# Patient Record
Sex: FEMALE | URBAN - METROPOLITAN AREA
[De-identification: names, ages, dates, MRNs, and addresses within clinical notes are randomized per-mention and may not be internally consistent; named-entity substitution may affect disease eponyms.]

---

## 2017-06-03 ENCOUNTER — INPATIENT (INPATIENT)
Facility: HOSPITAL | Age: 82
LOS: 1 days | Discharge: HOME | DRG: 202 | End: 2017-06-05
Attending: INTERNAL MEDICINE | Admitting: INTERNAL MEDICINE
Payer: MEDICARE

## 2017-06-03 VITALS
WEIGHT: 130.07 LBS | TEMPERATURE: 99 F | SYSTOLIC BLOOD PRESSURE: 152 MMHG | HEIGHT: 56 IN | RESPIRATION RATE: 18 BRPM | DIASTOLIC BLOOD PRESSURE: 98 MMHG | OXYGEN SATURATION: 98 % | HEART RATE: 87 BPM

## 2017-06-03 DIAGNOSIS — I10 ESSENTIAL (PRIMARY) HYPERTENSION: ICD-10-CM

## 2017-06-03 DIAGNOSIS — J45.909 UNSPECIFIED ASTHMA, UNCOMPLICATED: ICD-10-CM

## 2017-06-03 DIAGNOSIS — E11.8 TYPE 2 DIABETES MELLITUS WITH UNSPECIFIED COMPLICATIONS: ICD-10-CM

## 2017-06-03 LAB
ALBUMIN SERPL ELPH-MCNC: 3.7 G/DL — SIGNIFICANT CHANGE UP (ref 3.3–5)
ALP SERPL-CCNC: 113 U/L — SIGNIFICANT CHANGE UP (ref 30–120)
ALT FLD-CCNC: 31 U/L DA — SIGNIFICANT CHANGE UP (ref 10–60)
ANION GAP SERPL CALC-SCNC: 8 MMOL/L — SIGNIFICANT CHANGE UP (ref 5–17)
APPEARANCE UR: CLEAR — SIGNIFICANT CHANGE UP
APTT BLD: 31.3 SEC — SIGNIFICANT CHANGE UP (ref 27.5–37.4)
AST SERPL-CCNC: 19 U/L — SIGNIFICANT CHANGE UP (ref 10–40)
BACTERIA # UR AUTO: ABNORMAL
BILIRUB SERPL-MCNC: 0.8 MG/DL — SIGNIFICANT CHANGE UP (ref 0.2–1.2)
BILIRUB UR-MCNC: NEGATIVE — SIGNIFICANT CHANGE UP
BUN SERPL-MCNC: 26 MG/DL — HIGH (ref 7–23)
CALCIUM SERPL-MCNC: 9.5 MG/DL — SIGNIFICANT CHANGE UP (ref 8.4–10.5)
CHLORIDE SERPL-SCNC: 102 MMOL/L — SIGNIFICANT CHANGE UP (ref 96–108)
CO2 SERPL-SCNC: 28 MMOL/L — SIGNIFICANT CHANGE UP (ref 22–31)
COLOR SPEC: YELLOW — SIGNIFICANT CHANGE UP
CREAT SERPL-MCNC: 1.16 MG/DL — SIGNIFICANT CHANGE UP (ref 0.5–1.3)
DIFF PNL FLD: ABNORMAL
EPI CELLS # UR: SIGNIFICANT CHANGE UP
GLUCOSE SERPL-MCNC: 245 MG/DL — HIGH (ref 70–99)
GLUCOSE UR QL: 100 MG/DL
HCT VFR BLD CALC: 48.8 % — HIGH (ref 34.5–45)
HGB BLD-MCNC: 16.3 G/DL — HIGH (ref 11.5–15.5)
INR BLD: 1 RATIO — SIGNIFICANT CHANGE UP (ref 0.88–1.16)
KETONES UR-MCNC: NEGATIVE — SIGNIFICANT CHANGE UP
LACTATE SERPL-SCNC: 1.4 MMOL/L — SIGNIFICANT CHANGE UP (ref 0.7–2)
LACTATE SERPL-SCNC: 2.7 MMOL/L — HIGH (ref 0.7–2)
LEUKOCYTE ESTERASE UR-ACNC: NEGATIVE — SIGNIFICANT CHANGE UP
MCHC RBC-ENTMCNC: 29.4 PG — SIGNIFICANT CHANGE UP (ref 27–34)
MCHC RBC-ENTMCNC: 33.4 GM/DL — SIGNIFICANT CHANGE UP (ref 32–36)
MCV RBC AUTO: 88.1 FL — SIGNIFICANT CHANGE UP (ref 80–100)
NITRITE UR-MCNC: NEGATIVE — SIGNIFICANT CHANGE UP
PH UR: 6 — SIGNIFICANT CHANGE UP (ref 5–8)
PLATELET # BLD AUTO: 263 K/UL — SIGNIFICANT CHANGE UP (ref 150–400)
POTASSIUM SERPL-MCNC: 4.9 MMOL/L — SIGNIFICANT CHANGE UP (ref 3.5–5.3)
POTASSIUM SERPL-SCNC: 4.9 MMOL/L — SIGNIFICANT CHANGE UP (ref 3.5–5.3)
PROT SERPL-MCNC: 8.3 G/DL — SIGNIFICANT CHANGE UP (ref 6–8.3)
PROT UR-MCNC: 30 MG/DL
PROTHROM AB SERPL-ACNC: 10.9 SEC — SIGNIFICANT CHANGE UP (ref 9.8–12.7)
RBC # BLD: 5.54 M/UL — HIGH (ref 3.8–5.2)
RBC # FLD: 12.9 % — SIGNIFICANT CHANGE UP (ref 10.3–14.5)
RBC CASTS # UR COMP ASSIST: SIGNIFICANT CHANGE UP /HPF (ref 0–4)
SODIUM SERPL-SCNC: 138 MMOL/L — SIGNIFICANT CHANGE UP (ref 135–145)
SP GR SPEC: 1.01 — SIGNIFICANT CHANGE UP (ref 1.01–1.02)
UROBILINOGEN FLD QL: NEGATIVE MG/DL — SIGNIFICANT CHANGE UP
WBC # BLD: 9.1 K/UL — SIGNIFICANT CHANGE UP (ref 3.8–10.5)
WBC # FLD AUTO: 9.1 K/UL — SIGNIFICANT CHANGE UP (ref 3.8–10.5)
WBC UR QL: SIGNIFICANT CHANGE UP

## 2017-06-03 PROCEDURE — 99223 1ST HOSP IP/OBS HIGH 75: CPT | Mod: AI

## 2017-06-03 PROCEDURE — 93010 ELECTROCARDIOGRAM REPORT: CPT

## 2017-06-03 PROCEDURE — 99285 EMERGENCY DEPT VISIT HI MDM: CPT

## 2017-06-03 PROCEDURE — 71020: CPT | Mod: 26

## 2017-06-03 RX ORDER — DEXTROSE 50 % IN WATER 50 %
25 SYRINGE (ML) INTRAVENOUS ONCE
Qty: 0 | Refills: 0 | Status: DISCONTINUED | OUTPATIENT
Start: 2017-06-03 | End: 2017-06-05

## 2017-06-03 RX ORDER — SODIUM CHLORIDE 9 MG/ML
1000 INJECTION INTRAMUSCULAR; INTRAVENOUS; SUBCUTANEOUS
Qty: 0 | Refills: 0 | Status: DISCONTINUED | OUTPATIENT
Start: 2017-06-03 | End: 2017-06-04

## 2017-06-03 RX ORDER — SODIUM CHLORIDE 9 MG/ML
1000 INJECTION, SOLUTION INTRAVENOUS
Qty: 0 | Refills: 0 | Status: DISCONTINUED | OUTPATIENT
Start: 2017-06-03 | End: 2017-06-05

## 2017-06-03 RX ORDER — ALBUTEROL 90 UG/1
1 AEROSOL, METERED ORAL EVERY 4 HOURS
Qty: 0 | Refills: 0 | Status: DISCONTINUED | OUTPATIENT
Start: 2017-06-03 | End: 2017-06-04

## 2017-06-03 RX ORDER — GLUCAGON INJECTION, SOLUTION 0.5 MG/.1ML
1 INJECTION, SOLUTION SUBCUTANEOUS ONCE
Qty: 0 | Refills: 0 | Status: DISCONTINUED | OUTPATIENT
Start: 2017-06-03 | End: 2017-06-05

## 2017-06-03 RX ORDER — INSULIN LISPRO 100/ML
VIAL (ML) SUBCUTANEOUS AT BEDTIME
Qty: 0 | Refills: 0 | Status: DISCONTINUED | OUTPATIENT
Start: 2017-06-03 | End: 2017-06-05

## 2017-06-03 RX ORDER — CLOPIDOGREL BISULFATE 75 MG/1
75 TABLET, FILM COATED ORAL DAILY
Qty: 0 | Refills: 0 | Status: DISCONTINUED | OUTPATIENT
Start: 2017-06-03 | End: 2017-06-05

## 2017-06-03 RX ORDER — INSULIN LISPRO 100/ML
VIAL (ML) SUBCUTANEOUS
Qty: 0 | Refills: 0 | Status: DISCONTINUED | OUTPATIENT
Start: 2017-06-03 | End: 2017-06-05

## 2017-06-03 RX ORDER — HEPARIN SODIUM 5000 [USP'U]/ML
5000 INJECTION INTRAVENOUS; SUBCUTANEOUS EVERY 12 HOURS
Qty: 0 | Refills: 0 | Status: DISCONTINUED | OUTPATIENT
Start: 2017-06-03 | End: 2017-06-05

## 2017-06-03 RX ORDER — DEXTROSE 50 % IN WATER 50 %
12.5 SYRINGE (ML) INTRAVENOUS ONCE
Qty: 0 | Refills: 0 | Status: DISCONTINUED | OUTPATIENT
Start: 2017-06-03 | End: 2017-06-05

## 2017-06-03 RX ORDER — LOSARTAN POTASSIUM 100 MG/1
25 TABLET, FILM COATED ORAL DAILY
Qty: 0 | Refills: 0 | Status: DISCONTINUED | OUTPATIENT
Start: 2017-06-03 | End: 2017-06-05

## 2017-06-03 RX ORDER — IPRATROPIUM/ALBUTEROL SULFATE 18-103MCG
3 AEROSOL WITH ADAPTER (GRAM) INHALATION EVERY 6 HOURS
Qty: 0 | Refills: 0 | Status: DISCONTINUED | OUTPATIENT
Start: 2017-06-03 | End: 2017-06-04

## 2017-06-03 RX ORDER — MULTIVIT-MIN/FERROUS GLUCONATE 9 MG/15 ML
1 LIQUID (ML) ORAL DAILY
Qty: 0 | Refills: 0 | Status: DISCONTINUED | OUTPATIENT
Start: 2017-06-03 | End: 2017-06-05

## 2017-06-03 RX ORDER — SODIUM CHLORIDE 9 MG/ML
1000 INJECTION INTRAMUSCULAR; INTRAVENOUS; SUBCUTANEOUS ONCE
Qty: 0 | Refills: 0 | Status: COMPLETED | OUTPATIENT
Start: 2017-06-03 | End: 2017-06-03

## 2017-06-03 RX ORDER — IPRATROPIUM/ALBUTEROL SULFATE 18-103MCG
3 AEROSOL WITH ADAPTER (GRAM) INHALATION ONCE
Qty: 0 | Refills: 0 | Status: COMPLETED | OUTPATIENT
Start: 2017-06-03 | End: 2017-06-03

## 2017-06-03 RX ORDER — DEXTROSE 50 % IN WATER 50 %
1 SYRINGE (ML) INTRAVENOUS ONCE
Qty: 0 | Refills: 0 | Status: DISCONTINUED | OUTPATIENT
Start: 2017-06-03 | End: 2017-06-05

## 2017-06-03 RX ORDER — METOPROLOL TARTRATE 50 MG
25 TABLET ORAL DAILY
Qty: 0 | Refills: 0 | Status: DISCONTINUED | OUTPATIENT
Start: 2017-06-03 | End: 2017-06-05

## 2017-06-03 RX ADMIN — SODIUM CHLORIDE 1000 MILLILITER(S): 9 INJECTION INTRAMUSCULAR; INTRAVENOUS; SUBCUTANEOUS at 20:10

## 2017-06-03 RX ADMIN — Medication 3 MILLILITER(S): at 19:16

## 2017-06-03 RX ADMIN — SODIUM CHLORIDE 1000 MILLILITER(S): 9 INJECTION INTRAMUSCULAR; INTRAVENOUS; SUBCUTANEOUS at 18:15

## 2017-06-03 RX ADMIN — SODIUM CHLORIDE 100 MILLILITER(S): 9 INJECTION INTRAMUSCULAR; INTRAVENOUS; SUBCUTANEOUS at 22:23

## 2017-06-03 NOTE — ED ADULT NURSE NOTE - OBJECTIVE STATEMENT
I have a cough for about a week and it's getting worse. I was told by my doctor that I have pneumonia

## 2017-06-03 NOTE — ED PROVIDER NOTE - MEDICAL DECISION MAKING DETAILS
Pt continues to be SOB despite being treated with Z-Nadeem for pneumonia this past week by Dr. Huang. Pt was seen by him this morning and told to get admitted to the hospital because of continued symptoms

## 2017-06-03 NOTE — H&P ADULT - NSHPREVIEWOFSYSTEMS_GEN_ALL_CORE
CONSTITUTIONAL: No fever, weight loss, or fatigue.  EYES: No eye pain, visual disturbances, or discharge.  ENMT:  No difficulty hearing, tinnitus, vertigo; No sinus or throat pain, (+) post nasal drip.  NECK: No pain or stiffness.  RESPIRATORY: (+) cough & wheezing, no hemoptysis; (+) shortness of breath.  CARDIOVASCULAR: No chest pain, palpitations, dizziness, or leg swelling.  GASTROINTESTINAL: No abdominal or epigastric pain. No nausea, vomiting, or hematemesis; No diarrhea or Change in bowel habits. No melena or hematochezia.  GENITOURINARY: No dysuria, frequency, hematuria, or incontinence.  NEUROLOGICAL: No headaches, focal muscle weakness, numbness, or tremors.  SKIN: No itching, burning, rashes, or lesions.  MUSCULOSKELETAL: No muscle or back pain.  PSYCHIATRIC: No depression, anxiety, or agitation.  HEME/LYMPH: No easy bruising, bleeding gums, or nose bleed.  ALLERGY AND IMMUNOLOGIC: No hives or eczema.

## 2017-06-03 NOTE — PROGRESS NOTE ADULT - SUBJECTIVE AND OBJECTIVE BOX
Patient is a 84y old  Female who presents with a chief complaint of Cough with SOB. (2017 22:09)      BRIEF HOSPITAL COURSE:   84F w/ recently diagnosed PNA by out patient PCP, started on a z-mariana and steroid taper. She has finished anbx's and almost finished  steroid taper. However, she did not improve, went back to see her PCP and he told her to come to the ER.   -patient currently in [facility] as a 1E boarder, as there were no beds available on 1E.   -patient is currently admitted for "asthma exacerbation."        PAST MEDICAL & SURGICAL HISTORY:  Hepatitis B  Macular degeneration  Aortic aneurysm  HTN (hypertension)  No significant past surgical history      Review of Systems:  CONSTITUTIONAL: No fever, chills, or fatigue  RESPIRATORY: No cough, wheezing, chills or hemoptysis; No shortness of breath  CARDIOVASCULAR: No chest pain, palpitations, dizziness, or leg swelling  GASTROINTESTINAL: No abdominal or epigastric pain. No nausea, vomiting, or hematemesis; No diarrhea or constipation. No melena or hematochezia.        Medications:    losartan 25milliGRAM(s) Oral daily  metoprolol succinate ER 25milliGRAM(s) Oral daily    ALBUTerol/ipratropium for Nebulization 3milliLiter(s) Nebulizer every 6 hours  ALBUTerol    90 MICROgram(s) HFA Inhaler 1Puff(s) Inhalation every 4 hours        heparin  Injectable 5000Unit(s) SubCutaneous every 12 hours  clopidogrel Tablet 75milliGRAM(s) Oral daily        predniSONE   Tablet 40milliGRAM(s) Oral daily  insulin lispro (HumaLOG) corrective regimen sliding scale  SubCutaneous three times a day before meals  insulin lispro (HumaLOG) corrective regimen sliding scale  SubCutaneous at bedtime  dextrose Gel 1Dose(s) Oral once PRN  dextrose 50% Injectable 12.5Gram(s) IV Push once  dextrose 50% Injectable 25Gram(s) IV Push once  dextrose 50% Injectable 25Gram(s) IV Push once  glucagon  Injectable 1milliGRAM(s) IntraMuscular once PRN    sodium chloride 0.9%. 1000milliLiter(s) IV Continuous <Continuous>  dextrose 5%. 1000milliLiter(s) IV Continuous <Continuous>  multivitamin/minerals 1Tablet(s) Oral daily                ICU Vital Signs Last 24 Hrs  T(C): 36.6, Max: 37 ( @ 17:21)  T(F): 97.8, Max: 98.6 (- @ 17:21)  HR: 71 (65 - 87)  BP: 148/76 (140/70 - 152/98)  BP(mean): 97 (97 - 97)  ABP: --  ABP(mean): --  RR: 16 (16 - 20)  SpO2: 95% (95% - 99%)          I&O's Detail    I & Os for current day (as of 2017 23:39)  =============================================  IN:    Sodium Chloride 0.9% IV Bolus: 2000 ml    Total IN: 2000 ml  ---------------------------------------------  OUT:    Total OUT: 0 ml  ---------------------------------------------  Total NET: 2000 ml        LABS:                        16.3   9.1   )-----------( 263      ( 2017 18:20 )             48.8     06-    138  |  102  |  26<H>  ----------------------------<  245<H>  4.9   |  28  |  1.16    Ca    9.5      2017 18:20    TPro  8.3  /  Alb  3.7  /  TBili  0.8  /  DBili  x   /  AST  19  /  ALT  31  /  AlkPhos  113  06-03          CAPILLARY BLOOD GLUCOSE  131 (2017 23:20)    PT/INR - ( 2017 18:20 )   PT: 10.9 sec;   INR: 1.00 ratio         PTT - ( 2017 18:20 )  PTT:31.3 sec  Urinalysis Basic - ( 2017 21:22 )    Color: Yellow / Appearance: Clear / S.015 / pH: x  Gluc: x / Ketone: Negative  / Bili: Negative / Urobili: Negative mg/dL   Blood: x / Protein: 30 mg/dL / Nitrite: Negative   Leuk Esterase: Negative / RBC: 0-2 /HPF / WBC 0-2   Sq Epi: x / Non Sq Epi: Few / Bacteria: Few      CULTURES:      Physical Examination:    General: No acute distress.  Alert, oriented, interactive, nonfocal    HEENT: Pupils equal, reactive to light.  Symmetric.    PULM: Clear to auscultation bilaterally, no significant sputum production    CVS: Regular rate and rhythm, no murmurs, rubs, or gallops    ABD: Soft, nondistended, nontender, normoactive bowel sounds, no masses

## 2017-06-03 NOTE — H&P ADULT - PROBLEM SELECTOR PLAN 1
just finished a Z-pack course at home without improvement, admitted to Telemetry, with continuous SPO2 monitoring, Oxygen via NC, bronchodilator nebs, Guaifenesin, Prednisone 40 mg PO daily, Levofloxacin 500 mg IVPB daily, IVF hydration with NS, Pulmonary consult with Dr. Coleman was called.

## 2017-06-03 NOTE — ED ADULT NURSE NOTE - PMH
Aortic aneurysm    HTN (hypertension)    Macular degeneration Aortic aneurysm    Hepatitis B    HTN (hypertension)    Macular degeneration

## 2017-06-03 NOTE — ED PROVIDER NOTE - OBJECTIVE STATEMENT
85 y/o F pt with history of aortic aneurysm, HTN, presents to the ED c/o cough and shortness of breath for approximately a week. Pt also has a headache. Pt saw PCP twice this week and had an x-ray and was diagnosed with pneumonia and prescribed a Z-pac and prednisone, with no relief. Pt had the pneumonia and flu vaccine. Denies fever, nausea, vomiting, diarrhea. No further complaints at this time.   PCP: Dr. Huang

## 2017-06-03 NOTE — H&P ADULT - PROBLEM SELECTOR PLAN 5
Patient is at high risk for VTE, started on LMWH 5000 units subcutaneously Q 12h for DVT prophylaxis.

## 2017-06-03 NOTE — ED PROVIDER NOTE - CONSTITUTIONAL, MLM
normal... Well appearing, intermittent coughing well nourished, awake, alert, oriented to person, place, time/situation and in no apparent distress.

## 2017-06-03 NOTE — ED PROVIDER NOTE - ENMT, MLM
Airway patent, Nasal mucosa clear. mildly dry. Throat has no vesicles, no oropharyngeal exudates and uvula is midline.

## 2017-06-03 NOTE — ED ADULT NURSE NOTE - CHPI ED SYMPTOMS POS
CHEST CONGESTION/COUGH/SHORTNESS OF BREATH/DYSPNEA ON EXERTION/WHEEZING right base/COUGH/CHEST CONGESTION/WHEEZING/DYSPNEA ON EXERTION/SHORTNESS OF BREATH

## 2017-06-03 NOTE — H&P ADULT - NSHPPHYSICALEXAM_GEN_ALL_CORE
GENERAL: NAD, well-groomed, well-developed.  HEAD:  Atraumatic, Normocephalic.  EYES: PERRLA, conjunctiva clear.  ENMT: no nasal discharge, no ronnie-pharyngeal erythema or exudates, (+) postnasal drip, dry MM.   NECK: Supple, No JVD.  NERVOUS SYSTEM:  Alert & oriented X3, neurologically intact grossly.  CHEST/LUNG: Good air entry B/L, no rales, rhonchi, or wheezing.  HEART: Normal S1 & S2, Grade II/VI LISA over cardiac base martine apex, no extra sounds.  ABDOMEN: Soft, non tender, non distended; bowel sounds present, no palpable masses or organomegaly.  EXTREMITIES:  No clubbing, cyanosis, or edema.  VASCULAR: 2+ radial, DPA / PTA pulses B/L.  SKIN: No rashes or lesions.  PSYCH: normal affect & behavior. GENERAL: NAD, well-groomed, well-developed.  HEAD:  Atraumatic, Normocephalic.  EYES: PERRLA, conjunctiva clear.  ENMT: no nasal discharge, no ronnie-pharyngeal erythema or exudates, (+) postnasal drip, dry MM.   NECK: Supple, No JVD.  NERVOUS SYSTEM:  Alert & oriented X3, neurologically intact grossly.  CHEST/LUNG: Good air entry B/L, no rales, rhonchi, or wheezing.  HEART: Normal S1 & S2, Grade II/VI LISA over cardiac base propagated to apex, no extra sounds.  ABDOMEN: Soft, non tender, non distended; bowel sounds present, no palpable masses or organomegaly.  EXTREMITIES:  No clubbing, cyanosis, or edema.  VASCULAR: 2+ radial, DPA / PTA pulses B/L.  SKIN: No rashes or lesions.  PSYCH: normal affect & behavior.

## 2017-06-03 NOTE — H&P ADULT - ASSESSMENT
83 y/o F with PMH of HTN, Aortic Aneurysm, Rheumatic Fever as a kid, Hep-B post transfusion, and Macular Degeneration presented with cough, SOB & chest wheezing.

## 2017-06-03 NOTE — H&P ADULT - FAMILY HISTORY
Mother  Still living? No  Family history of diabetes mellitus, Age at diagnosis: Age Unknown     Sibling  Still living? Unknown  Family history of diabetes mellitus, Age at diagnosis: Age Unknown  Family history of gestational diabetes, Age at diagnosis: Age Unknown

## 2017-06-03 NOTE — H&P ADULT - PROBLEM SELECTOR PLAN 3
RBS of 245 mg/dl, possible undiagnosed new onset DM, patient was on prednisone tapering course at home, denies any H/o DM, but reports strong family history, started her on  POC before meals & at bedtime with Lispro insulin coverage on a SS, will check Glycohemoglobin level in am.

## 2017-06-03 NOTE — ED PROVIDER NOTE - NS ED MD SCRIBE ATTENDING SCRIBE SECTIONS
REVIEW OF SYSTEMS/PAST MEDICAL/SURGICAL/SOCIAL HISTORY/VITAL SIGNS( Pullset)/HISTORY OF PRESENT ILLNESS/DISPOSITION/PHYSICAL EXAM

## 2017-06-03 NOTE — ED ADULT TRIAGE NOTE - CHIEF COMPLAINT QUOTE
I was at Dr. Jameson 2 xs this week; on z-pack, prednisone, inhaler & cough syrup for pneumonia.  He said to come to ED if I wasn't feeling better.

## 2017-06-03 NOTE — ED ADULT NURSE REASSESSMENT NOTE - NS ED NURSE REASSESS COMMENT FT1
Lactate drawn
Patient encouraged to keep Arm straight to complete NS bolus. Patient verbalized understanding of instructions.

## 2017-06-03 NOTE — H&P ADULT - HISTORY OF PRESENT ILLNESS
This is an 85 y/o F with PMH of HTN, Aortic Aneurysm, Hep-B, and Macular Degeneration presented with 8 days H/O productive cough of scanty thick sputum associated with chest wheezing & SOB. Patient was seen by her PMD last Tuesday, had a CXR, was diagnosed with PNA, and was sent home on Z-Pack, and prednisone. Patient states that she just finished the Abx & almost done with the steroid tapering course, yet still not feeling better, so she saw him again, he recommended her to be admitted to the hospital. Patient denies any fever or chills or flue-like symptoms, but reports post nasal drip, also reports having a sick neighbor that she was in the car with her just prior to the onset of symptoms. She denies any H/O asthma or COPD, stating that she is exercising for 2 miles on treadmill 5 days a week on regular basis. This is an 83 y/o F with PMH of HTN, Aortic Aneurysm, Rheumatic Fever as a kid, Hep-B post transfusion, and Macular Degeneration presented with 8 days H/O productive cough of scanty thick sputum associated with chest wheezing & SOB. Patient was seen by her PMD last Tuesday, had a CXR, was diagnosed with PNA, and was sent home on Z-Pack, and prednisone. Patient states that she just finished the Abx & almost done with the steroid tapering course, yet still not feeling better, so she saw him again, he recommended her to be admitted to the hospital. Patient denies any fever, chills, or flue-like symptoms, but reports post nasal drip. She also reports having a sick neighbor that she was in the car with her just prior to the onset of symptoms. She denies any H/O asthma or COPD, stating that she is exercising for 2 miles on treadmill 5 days a week on regular basis.

## 2017-06-04 DIAGNOSIS — E86.0 DEHYDRATION: ICD-10-CM

## 2017-06-04 DIAGNOSIS — Z29.9 ENCOUNTER FOR PROPHYLACTIC MEASURES, UNSPECIFIED: ICD-10-CM

## 2017-06-04 DIAGNOSIS — R73.09 OTHER ABNORMAL GLUCOSE: ICD-10-CM

## 2017-06-04 DIAGNOSIS — J45.909 UNSPECIFIED ASTHMA, UNCOMPLICATED: ICD-10-CM

## 2017-06-04 LAB
ANION GAP SERPL CALC-SCNC: 7 MMOL/L — SIGNIFICANT CHANGE UP (ref 5–17)
BUN SERPL-MCNC: 19 MG/DL — SIGNIFICANT CHANGE UP (ref 7–23)
CALCIUM SERPL-MCNC: 8.8 MG/DL — SIGNIFICANT CHANGE UP (ref 8.4–10.5)
CHLORIDE SERPL-SCNC: 109 MMOL/L — HIGH (ref 96–108)
CO2 SERPL-SCNC: 27 MMOL/L — SIGNIFICANT CHANGE UP (ref 22–31)
CREAT SERPL-MCNC: 0.84 MG/DL — SIGNIFICANT CHANGE UP (ref 0.5–1.3)
GLUCOSE SERPL-MCNC: 89 MG/DL — SIGNIFICANT CHANGE UP (ref 70–99)
HBA1C BLD-MCNC: 6 % — HIGH (ref 4–5.6)
HCT VFR BLD CALC: 42.8 % — SIGNIFICANT CHANGE UP (ref 34.5–45)
HGB BLD-MCNC: 14.8 G/DL — SIGNIFICANT CHANGE UP (ref 11.5–15.5)
IGE SERPL-ACNC: 13 IU/ML — SIGNIFICANT CHANGE UP (ref 0–100)
MCHC RBC-ENTMCNC: 30.8 PG — SIGNIFICANT CHANGE UP (ref 27–34)
MCHC RBC-ENTMCNC: 34.7 GM/DL — SIGNIFICANT CHANGE UP (ref 32–36)
MCV RBC AUTO: 88.8 FL — SIGNIFICANT CHANGE UP (ref 80–100)
NT-PROBNP SERPL-SCNC: 311 PG/ML — SIGNIFICANT CHANGE UP (ref 0–450)
PLATELET # BLD AUTO: 242 K/UL — SIGNIFICANT CHANGE UP (ref 150–400)
POTASSIUM SERPL-MCNC: 4.2 MMOL/L — SIGNIFICANT CHANGE UP (ref 3.5–5.3)
POTASSIUM SERPL-SCNC: 4.2 MMOL/L — SIGNIFICANT CHANGE UP (ref 3.5–5.3)
RBC # BLD: 4.82 M/UL — SIGNIFICANT CHANGE UP (ref 3.8–5.2)
RBC # FLD: 12.8 % — SIGNIFICANT CHANGE UP (ref 10.3–14.5)
SODIUM SERPL-SCNC: 143 MMOL/L — SIGNIFICANT CHANGE UP (ref 135–145)
WBC # BLD: 8.2 K/UL — SIGNIFICANT CHANGE UP (ref 3.8–10.5)
WBC # FLD AUTO: 8.2 K/UL — SIGNIFICANT CHANGE UP (ref 3.8–10.5)

## 2017-06-04 PROCEDURE — 99233 SBSQ HOSP IP/OBS HIGH 50: CPT

## 2017-06-04 RX ORDER — ALBUTEROL 90 UG/1
2.5 AEROSOL, METERED ORAL EVERY 6 HOURS
Qty: 0 | Refills: 0 | Status: DISCONTINUED | OUTPATIENT
Start: 2017-06-04 | End: 2017-06-04

## 2017-06-04 RX ORDER — LORATADINE 10 MG/1
10 TABLET ORAL DAILY
Qty: 0 | Refills: 0 | Status: DISCONTINUED | OUTPATIENT
Start: 2017-06-04 | End: 2017-06-05

## 2017-06-04 RX ORDER — BUDESONIDE AND FORMOTEROL FUMARATE DIHYDRATE 160; 4.5 UG/1; UG/1
2 AEROSOL RESPIRATORY (INHALATION)
Qty: 0 | Refills: 0 | Status: DISCONTINUED | OUTPATIENT
Start: 2017-06-04 | End: 2017-06-05

## 2017-06-04 RX ORDER — ALBUTEROL 90 UG/1
2.5 AEROSOL, METERED ORAL ONCE
Qty: 0 | Refills: 0 | Status: DISCONTINUED | OUTPATIENT
Start: 2017-06-04 | End: 2017-06-05

## 2017-06-04 RX ORDER — PANTOPRAZOLE SODIUM 20 MG/1
40 TABLET, DELAYED RELEASE ORAL
Qty: 0 | Refills: 0 | Status: DISCONTINUED | OUTPATIENT
Start: 2017-06-04 | End: 2017-06-05

## 2017-06-04 RX ADMIN — Medication 25 MILLIGRAM(S): at 06:31

## 2017-06-04 RX ADMIN — HEPARIN SODIUM 5000 UNIT(S): 5000 INJECTION INTRAVENOUS; SUBCUTANEOUS at 17:50

## 2017-06-04 RX ADMIN — LOSARTAN POTASSIUM 25 MILLIGRAM(S): 100 TABLET, FILM COATED ORAL at 06:31

## 2017-06-04 RX ADMIN — BUDESONIDE AND FORMOTEROL FUMARATE DIHYDRATE 2 PUFF(S): 160; 4.5 AEROSOL RESPIRATORY (INHALATION) at 17:51

## 2017-06-04 RX ADMIN — LORATADINE 10 MILLIGRAM(S): 10 TABLET ORAL at 11:30

## 2017-06-04 RX ADMIN — PANTOPRAZOLE SODIUM 40 MILLIGRAM(S): 20 TABLET, DELAYED RELEASE ORAL at 07:10

## 2017-06-04 RX ADMIN — HEPARIN SODIUM 5000 UNIT(S): 5000 INJECTION INTRAVENOUS; SUBCUTANEOUS at 06:25

## 2017-06-04 RX ADMIN — Medication 1 TABLET(S): at 11:30

## 2017-06-04 RX ADMIN — ALBUTEROL 2.5 MILLIGRAM(S): 90 AEROSOL, METERED ORAL at 08:05

## 2017-06-04 RX ADMIN — BUDESONIDE AND FORMOTEROL FUMARATE DIHYDRATE 2 PUFF(S): 160; 4.5 AEROSOL RESPIRATORY (INHALATION) at 07:52

## 2017-06-04 RX ADMIN — Medication 3 MILLILITER(S): at 00:35

## 2017-06-04 RX ADMIN — CLOPIDOGREL BISULFATE 75 MILLIGRAM(S): 75 TABLET, FILM COATED ORAL at 11:30

## 2017-06-04 RX ADMIN — Medication 40 MILLIGRAM(S): at 06:31

## 2017-06-04 NOTE — CONSULT NOTE ADULT - SUBJECTIVE AND OBJECTIVE BOX
Date/Time Patient Seen:  		  Referring MD:   Data Reviewed	       This is an 85 y/o F with PMH of HTN, Aortic Aneurysm, Rheumatic Fever as a kid, Hep-B post transfusion, and Macular Degeneration presented with 8 days H/O productive cough of scanty thick sputum associated with chest wheezing & SOB. Patient was seen by her PMD last Tuesday, had a CXR, was diagnosed with PNA, and was sent home on Z-Pack, and prednisone. Patient states that she just finished the Abx & almost done with the steroid tapering course, yet still not feeling better, so she saw him again, he recommended her to be admitted to the hospital. Patient denies any fever, chills, or flue-like symptoms, but reports post nasal drip. She also reports having a sick neighbor that she was in the car with her just prior to the onset of symptoms. She denies any H/O asthma or COPD, stating that she is exercising for 2 miles on treadmill 5 days a week on regular basis.        Subjective/HPI     PAST MEDICAL & SURGICAL HISTORY:  Hepatitis B  Macular degeneration  Aortic aneurysm  HTN (hypertension)  No significant past surgical history        Medication list         MEDICATIONS  (STANDING):  heparin  Injectable 5000Unit(s) SubCutaneous every 12 hours  predniSONE   Tablet 40milliGRAM(s) Oral daily  insulin lispro (HumaLOG) corrective regimen sliding scale  SubCutaneous three times a day before meals  insulin lispro (HumaLOG) corrective regimen sliding scale  SubCutaneous at bedtime  dextrose 5%. 1000milliLiter(s) IV Continuous <Continuous>  dextrose 50% Injectable 12.5Gram(s) IV Push once  dextrose 50% Injectable 25Gram(s) IV Push once  dextrose 50% Injectable 25Gram(s) IV Push once  losartan 25milliGRAM(s) Oral daily  clopidogrel Tablet 75milliGRAM(s) Oral daily  metoprolol succinate ER 25milliGRAM(s) Oral daily  multivitamin/minerals 1Tablet(s) Oral daily  buDESOnide 160 MICROgram(s)/formoterol 4.5 MICROgram(s) Inhaler 2Puff(s) Inhalation two times a day  ALBUTerol    0.083% 2.5milliGRAM(s) Nebulizer every 6 hours    MEDICATIONS  (PRN):  dextrose Gel 1Dose(s) Oral once PRN Blood Glucose LESS THAN 70 milliGRAM(s)/deciliter  glucagon  Injectable 1milliGRAM(s) IntraMuscular once PRN Glucose LESS THAN 70 milligrams/deciliter         Vitals log        ICU Vital Signs Last 24 Hrs  T(C): 36.6, Max: 37 (06-03 @ 17:21)  T(F): 97.8, Max: 98.6 (06-03 @ 17:21)  HR: 57 (57 - 87)  BP: 150/72 (140/70 - 152/98)  BP(mean): 94 (94 - 97)  ABP: --  ABP(mean): --  RR: 16 (16 - 20)  SpO2: 99% (95% - 99%)           Input and Output:  I&O's Detail    I & Os for current day (as of 04 Jun 2017 05:44)  =============================================  IN:    Sodium Chloride 0.9% IV Bolus: 2000 ml    sodium chloride 0.9%: 400 ml    Total IN: 2400 ml  ---------------------------------------------  OUT:    Total OUT: 0 ml  ---------------------------------------------  Total NET: 2400 ml      Lab Data                        16.3   9.1   )-----------( 263      ( 03 Jun 2017 18:20 )             48.8     06-03    138  |  102  |  26<H>  ----------------------------<  245<H>  4.9   |  28  |  1.16    Ca    9.5      03 Jun 2017 18:20    TPro  8.3  /  Alb  3.7  /  TBili  0.8  /  DBili  x   /  AST  19  /  ALT  31  /  AlkPhos  113  06-03      non smoker  lives at home  retired        Review of Systems	  SOB  wheeze    Objective     Physical Examination    head at  heart - s1s2  lungs - dec BS  no wheeze at present      Pertinent Lab findings & Imaging      Óscar:  NO   Adequate UO     I&O's Detail    I & Os for current day (as of 04 Jun 2017 05:44)  =============================================  IN:    Sodium Chloride 0.9% IV Bolus: 2000 ml    sodium chloride 0.9%: 400 ml    Total IN: 2400 ml  ---------------------------------------------  OUT:    Total OUT: 0 ml  ---------------------------------------------  Total NET: 2400 ml           Discussed with:     Cultures:	        Radiology      cxr - no evidence of PNA on prelim report  will await official cxr report

## 2017-06-04 NOTE — PROGRESS NOTE ADULT - SUBJECTIVE AND OBJECTIVE BOX
Patient is a 84y old  Female who presents with a chief complaint of Cough with SOB. (2017 22:09)        HPI:  This is an 83 y/o F with PMH of HTN, Aortic Aneurysm, Rheumatic Fever as a kid, Hep-B post transfusion, and Macular Degeneration presented with 8 days H/O productive cough of scanty thick sputum associated with chest wheezing & SOB. Patient was seen by her PMD last Tuesday, had a CXR, was diagnosed with PNA, and was sent home on Z-Pack, and prednisone. Patient states that she just finished the Abx & almost done with the steroid tapering course, yet still not feeling better, so she saw him again, he recommended her to be admitted to the hospital. Patient denies any fever, chills, or flue-like symptoms, but reports post nasal drip. She also reports having a sick neighbor that she was in the car with her just prior to the onset of symptoms. She denies any H/O asthma or COPD, stating that she is exercising for 2 miles on treadmill 5 days a week on regular basis. (2017 22:09)      SUBJECTIVE & OBJECTIVE: Pt seen and examined at bedside.     PHYSICAL EXAM:  T(C): 36.6, Max: 37 ( @ 17:21)  HR: 60 (57 - 87)  BP: 151/76 (140/70 - 152/98)  RR: 9 (9 - 20)  SpO2: 98% (95% - 99%)  Wt(kg): -- Height (cm): 142.2 ( @ 22:34)  Weight (kg): 68.3 ( @ 22:34)  BMI (kg/m2): 33.8 ( @ 22:34)  BSA (m2): 1.57 ( @ 22:34)  GENERAL: NAD, well-groomed, well-developed  HEAD:  Atraumatic, Normocephalic  EYES: EOMI, PERRLA, conjunctiva and sclera clear  ENMT: Moist mucous membranes  NECK: Supple, No JVD  NERVOUS SYSTEM:  Alert & Oriented X3, Motor Strength 5/5 B/L upper and lower extremities; DTRs 2+ intact and symmetric  CHEST/LUNG: Clear to auscultation bilaterally; No rales, rhonchi, wheezing, or rubs  HEART: Regular rate and rhythm; No murmurs, rubs, or gallops  ABDOMEN: Soft, Nontender, Nondistended; Bowel sounds present  EXTREMITIES:  2+ Peripheral Pulses, No clubbing, cyanosis, or edema        MEDICATIONS  (STANDING):  heparin  Injectable 5000Unit(s) SubCutaneous every 12 hours  predniSONE   Tablet 40milliGRAM(s) Oral daily  insulin lispro (HumaLOG) corrective regimen sliding scale  SubCutaneous three times a day before meals  insulin lispro (HumaLOG) corrective regimen sliding scale  SubCutaneous at bedtime  dextrose 5%. 1000milliLiter(s) IV Continuous <Continuous>  dextrose 50% Injectable 12.5Gram(s) IV Push once  dextrose 50% Injectable 25Gram(s) IV Push once  dextrose 50% Injectable 25Gram(s) IV Push once  losartan 25milliGRAM(s) Oral daily  clopidogrel Tablet 75milliGRAM(s) Oral daily  metoprolol succinate ER 25milliGRAM(s) Oral daily  multivitamin/minerals 1Tablet(s) Oral daily  buDESOnide 160 MICROgram(s)/formoterol 4.5 MICROgram(s) Inhaler 2Puff(s) Inhalation two times a day  ALBUTerol    0.083% 2.5milliGRAM(s) Nebulizer every 6 hours  loratadine 10milliGRAM(s) Oral daily  pantoprazole    Tablet 40milliGRAM(s) Oral before breakfast    MEDICATIONS  (PRN):  dextrose Gel 1Dose(s) Oral once PRN Blood Glucose LESS THAN 70 milliGRAM(s)/deciliter  glucagon  Injectable 1milliGRAM(s) IntraMuscular once PRN Glucose LESS THAN 70 milligrams/deciliter  guaiFENesin   Syrup  (Sugar-Free) 200milliGRAM(s) Oral every 6 hours PRN Cough      LABS:                        14.8   8.2   )-----------( 242      ( 2017 06:03 )             42.8     06-04    143  |  109<H>  |  19  ----------------------------<  89  4.2   |  27  |  0.84    Ca    8.8      2017 06:03    TPro  8.3  /  Alb  3.7  /  TBili  0.8  /  DBili  x   /  AST  19  /  ALT  31  /  AlkPhos  113  06-03    PT/INR - ( 2017 18:20 )   PT: 10.9 sec;   INR: 1.00 ratio         PTT - ( 2017 18:20 )  PTT:31.3 sec  Urinalysis Basic - ( 2017 21:22 )    Color: Yellow / Appearance: Clear / S.015 / pH: x  Gluc: x / Ketone: Negative  / Bili: Negative / Urobili: Negative mg/dL   Blood: x / Protein: 30 mg/dL / Nitrite: Negative   Leuk Esterase: Negative / RBC: 0-2 /HPF / WBC 0-2   Sq Epi: x / Non Sq Epi: Few / Bacteria: Few        CAPILLARY BLOOD GLUCOSE  131 (2017 23:20)      CAPILLARY BLOOD GLUCOSE  131 (2017 23:20)    CAPILLARY BLOOD GLUCOSE  131 (2017 23:20)            RECENT CULTURES:      RADIOLOGY & ADDITIONAL TESTS:                        DVT/GI ppx  Discussed with pt @ bedside

## 2017-06-04 NOTE — PROGRESS NOTE ADULT - PROBLEM SELECTOR PLAN 3
accucheck with insulin sliding scale  -consistent carb diet
RBS of 245 mg/dl, possible undiagnosed new onset DM, patient was on prednisone tapering course at home, denies any H/o DM, but reports strong family history, started her on  POC before meals & at bedtime with Lispro insulin coverage on a SS, will check Glycohemoglobin level in am.
accucheck with insulin sliding scale

## 2017-06-04 NOTE — DIETITIAN INITIAL EVALUATION ADULT. - OTHER INFO
Pt admitted with SOB,cough after recent dx pna which did not improve after antibiotics/prednisone . Hx: Hep B, macular degeneration, HTN. Pt reports good appetite and no significant wt change PTA. She denies trouble chewing/swallowing as well as N/V/C/D. Pt diet rx: DASH/TLC Consistent Carbohydrate. Pt states she was on prednisone PTA and was admitted with hyperglycemia. Explained rationale for therapeutic diet. Made pt aware of menu options available. Preferences obtained.

## 2017-06-04 NOTE — PROGRESS NOTE ADULT - SUBJECTIVE AND OBJECTIVE BOX
Patient is a 84y old  Female who presents with a chief complaint of Cough with SOB. (2017 22:09)      BRIEF HOSPITAL COURSE:   84F w/ recently diagnosed PNA by out patient PCP, started on a z-mariana and steroid taper. She has finished anbx's and almost finished  steroid taper. However, she did not improve, went back to see her PCP and he told her to come to the ER.   -patient currently in [facility] as a 1E boarder, as there were no beds available on 1E.   -patient is currently admitted for "asthma exacerbation."    Events last 24 hours:   -patient is doing well, comfortable in bed  -no wheeze noted  -does have perssitent "wet" cough, but otherwise no complaints    PAST MEDICAL & SURGICAL HISTORY:  Hepatitis B  Macular degeneration  Aortic aneurysm  HTN (hypertension)  No significant past surgical history      Review of Systems:  CONSTITUTIONAL: No fever, chills, or fatigue  EYES: No eye pain, visual disturbances, or discharge  NECK: No pain or stiffness  RESPIRATORY: (+)cough, no wheezing, chills or hemoptysis; No shortness of breath  CARDIOVASCULAR: No chest pain, palpitations, dizziness, or leg swelling  GASTROINTESTINAL: No abdominal or epigastric pain. No nausea, vomiting, or hematemesis; No diarrhea or constipation. No melena or hematochezia.      Medications:    losartan 25milliGRAM(s) Oral daily  metoprolol succinate ER 25milliGRAM(s) Oral daily    buDESOnide 160 MICROgram(s)/formoterol 4.5 MICROgram(s) Inhaler 2Puff(s) Inhalation two times a day  guaiFENesin   Syrup  (Sugar-Free) 200milliGRAM(s) Oral every 6 hours PRN  loratadine 10milliGRAM(s) Oral daily  ALBUTerol    0.083%. 2.5milliGRAM(s) Nebulizer once PRN        heparin  Injectable 5000Unit(s) SubCutaneous every 12 hours  clopidogrel Tablet 75milliGRAM(s) Oral daily    pantoprazole    Tablet 40milliGRAM(s) Oral before breakfast      predniSONE   Tablet 40milliGRAM(s) Oral daily  insulin lispro (HumaLOG) corrective regimen sliding scale  SubCutaneous three times a day before meals  insulin lispro (HumaLOG) corrective regimen sliding scale  SubCutaneous at bedtime  dextrose Gel 1Dose(s) Oral once PRN  dextrose 50% Injectable 12.5Gram(s) IV Push once  dextrose 50% Injectable 25Gram(s) IV Push once  dextrose 50% Injectable 25Gram(s) IV Push once  glucagon  Injectable 1milliGRAM(s) IntraMuscular once PRN    dextrose 5%. 1000milliLiter(s) IV Continuous <Continuous>  multivitamin/minerals 1Tablet(s) Oral daily                ICU Vital Signs Last 24 Hrs  T(C): 36.6, Max: 36.9 ( @ 12:00)  T(F): 97.8, Max: 98.5 ( @ 12:00)  HR: 72 (57 - 83)  BP: 125/61 (125/61 - 151/76)  BP(mean): 93 (93 - 97)  ABP: --  ABP(mean): --  RR: 22 (9 - 22)  SpO2: 95% (94% - 99%)          I&O's Detail  I & Os for 24h ending 2017 07:00  =============================================  IN:    Sodium Chloride 0.9% IV Bolus: 2000 ml    sodium chloride 0.9%: 400 ml    Total IN: 2400 ml  ---------------------------------------------  OUT:    Total OUT: 0 ml  ---------------------------------------------  Total NET: 2400 ml    I & Os for current day (as of 2017 20:41)  =============================================  IN:    Oral Fluid: 240 ml    Total IN: 240 ml  ---------------------------------------------  OUT:    Total OUT: 0 ml  ---------------------------------------------  Total NET: 240 ml        LABS:                        14.8   8.2   )-----------( 242      ( 2017 06:03 )             42.8     -04    143  |  109<H>  |  19  ----------------------------<  89  4.2   |  27  |  0.84    Ca    8.8      2017 06:03    TPro  8.3  /  Alb  3.7  /  TBili  0.8  /  DBili  x   /  AST  19  /  ALT  31  /  AlkPhos  113  -          CAPILLARY BLOOD GLUCOSE  148 (2017 16:45)    PT/INR - ( 2017 18:20 )   PT: 10.9 sec;   INR: 1.00 ratio         PTT - ( 2017 18:20 )  PTT:31.3 sec  Urinalysis Basic - ( 2017 21:22 )    Color: Yellow / Appearance: Clear / S.015 / pH: x  Gluc: x / Ketone: Negative  / Bili: Negative / Urobili: Negative mg/dL   Blood: x / Protein: 30 mg/dL / Nitrite: Negative   Leuk Esterase: Negative / RBC: 0-2 /HPF / WBC 0-2   Sq Epi: x / Non Sq Epi: Few / Bacteria: Few      CULTURES:  Culture Results:   No growth to date. ( @ 19:28)  Culture Results:   No growth to date. ( @ 19:28)      Physical Examination:    General: No acute distress.  Alert, oriented, interactive, nonfocal    HEENT: Pupils equal, reactive to light.  Symmetric.    PULM: Clear to auscultation bilaterally, no significant sputum production    CVS: Regular rate and rhythm, no murmurs, rubs, or gallops    ABD: Soft, nondistended, nontender, normoactive bowel sounds, no masses    EXT: No edema, nontender    SKIN: Warm and well perfused, no rashes noted.

## 2017-06-04 NOTE — PROGRESS NOTE ADULT - PROBLEM SELECTOR PROBLEM 3
Type 2 diabetes mellitus with complication, unspecified long term insulin use status
Abnormal blood sugar
Type 2 diabetes mellitus with complication, unspecified long term insulin use status

## 2017-06-04 NOTE — CONSULT NOTE ADULT - PROBLEM SELECTOR RECOMMENDATION 9
check RVP  check CRP and Procalcitonin, serial LABS  prednisone, add Symbicort, cont Albuterol QID  monitor Sats  out of bed  doubt PNA, WBC normal, CXR looks free of infiltrates, check ProBNP  robitussin and claritin for upper airway cough syndrome

## 2017-06-04 NOTE — PROGRESS NOTE ADULT - ASSESSMENT
85 y/o F with PMH of HTN, Aortic Aneurysm, Rheumatic Fever as a kid, Hep-B post transfusion, and Macular Degeneration presented with cough, admitted for acute hyperactive airway disease secondary to acute bronchitis

## 2017-06-05 VITALS
DIASTOLIC BLOOD PRESSURE: 76 MMHG | RESPIRATION RATE: 18 BRPM | HEART RATE: 80 BPM | SYSTOLIC BLOOD PRESSURE: 123 MMHG | OXYGEN SATURATION: 97 %

## 2017-06-05 DIAGNOSIS — B97.81 HUMAN METAPNEUMOVIRUS AS THE CAUSE OF DISEASES CLASSIFIED ELSEWHERE: ICD-10-CM

## 2017-06-05 LAB
ALBUMIN SERPL ELPH-MCNC: 3.3 G/DL — SIGNIFICANT CHANGE UP (ref 3.3–5)
ALP SERPL-CCNC: 87 U/L — SIGNIFICANT CHANGE UP (ref 30–120)
ALT FLD-CCNC: 26 U/L DA — SIGNIFICANT CHANGE UP (ref 10–60)
ANION GAP SERPL CALC-SCNC: 6 MMOL/L — SIGNIFICANT CHANGE UP (ref 5–17)
AST SERPL-CCNC: 23 U/L — SIGNIFICANT CHANGE UP (ref 10–40)
BILIRUB SERPL-MCNC: 0.6 MG/DL — SIGNIFICANT CHANGE UP (ref 0.2–1.2)
BUN SERPL-MCNC: 31 MG/DL — HIGH (ref 7–23)
CALCIUM SERPL-MCNC: 9.4 MG/DL — SIGNIFICANT CHANGE UP (ref 8.4–10.5)
CHLORIDE SERPL-SCNC: 108 MMOL/L — SIGNIFICANT CHANGE UP (ref 96–108)
CO2 SERPL-SCNC: 28 MMOL/L — SIGNIFICANT CHANGE UP (ref 22–31)
CREAT SERPL-MCNC: 1.11 MG/DL — SIGNIFICANT CHANGE UP (ref 0.5–1.3)
GLUCOSE SERPL-MCNC: 97 MG/DL — SIGNIFICANT CHANGE UP (ref 70–99)
HCT VFR BLD CALC: 44.9 % — SIGNIFICANT CHANGE UP (ref 34.5–45)
HGB BLD-MCNC: 15.6 G/DL — HIGH (ref 11.5–15.5)
MCHC RBC-ENTMCNC: 30 PG — SIGNIFICANT CHANGE UP (ref 27–34)
MCHC RBC-ENTMCNC: 34.6 GM/DL — SIGNIFICANT CHANGE UP (ref 32–36)
MCV RBC AUTO: 86.7 FL — SIGNIFICANT CHANGE UP (ref 80–100)
PLATELET # BLD AUTO: 236 K/UL — SIGNIFICANT CHANGE UP (ref 150–400)
POTASSIUM SERPL-MCNC: 4.3 MMOL/L — SIGNIFICANT CHANGE UP (ref 3.5–5.3)
POTASSIUM SERPL-SCNC: 4.3 MMOL/L — SIGNIFICANT CHANGE UP (ref 3.5–5.3)
PROCALCITONIN SERPL-MCNC: 0.1 NG/ML — HIGH (ref 0–0.04)
PROT SERPL-MCNC: 7.1 G/DL — SIGNIFICANT CHANGE UP (ref 6–8.3)
RBC # BLD: 5.18 M/UL — SIGNIFICANT CHANGE UP (ref 3.8–5.2)
RBC # FLD: 12.4 % — SIGNIFICANT CHANGE UP (ref 10.3–14.5)
SODIUM SERPL-SCNC: 142 MMOL/L — SIGNIFICANT CHANGE UP (ref 135–145)
TSH SERPL-MCNC: 1.79 UIU/ML — SIGNIFICANT CHANGE UP (ref 0.27–4.2)
WBC # BLD: 10.1 K/UL — SIGNIFICANT CHANGE UP (ref 3.8–10.5)
WBC # FLD AUTO: 10.1 K/UL — SIGNIFICANT CHANGE UP (ref 3.8–10.5)

## 2017-06-05 PROCEDURE — 99285 EMERGENCY DEPT VISIT HI MDM: CPT | Mod: 25

## 2017-06-05 PROCEDURE — 71046 X-RAY EXAM CHEST 2 VIEWS: CPT

## 2017-06-05 PROCEDURE — 83605 ASSAY OF LACTIC ACID: CPT

## 2017-06-05 PROCEDURE — 87040 BLOOD CULTURE FOR BACTERIA: CPT

## 2017-06-05 PROCEDURE — 82785 ASSAY OF IGE: CPT

## 2017-06-05 PROCEDURE — 83880 ASSAY OF NATRIURETIC PEPTIDE: CPT

## 2017-06-05 PROCEDURE — 86140 C-REACTIVE PROTEIN: CPT

## 2017-06-05 PROCEDURE — 85730 THROMBOPLASTIN TIME PARTIAL: CPT

## 2017-06-05 PROCEDURE — 87581 M.PNEUMON DNA AMP PROBE: CPT

## 2017-06-05 PROCEDURE — 84443 ASSAY THYROID STIM HORMONE: CPT

## 2017-06-05 PROCEDURE — 99239 HOSP IP/OBS DSCHRG MGMT >30: CPT

## 2017-06-05 PROCEDURE — 93005 ELECTROCARDIOGRAM TRACING: CPT

## 2017-06-05 PROCEDURE — 87633 RESP VIRUS 12-25 TARGETS: CPT

## 2017-06-05 PROCEDURE — 81001 URINALYSIS AUTO W/SCOPE: CPT

## 2017-06-05 PROCEDURE — 80053 COMPREHEN METABOLIC PANEL: CPT

## 2017-06-05 PROCEDURE — 87798 DETECT AGENT NOS DNA AMP: CPT

## 2017-06-05 PROCEDURE — 85610 PROTHROMBIN TIME: CPT

## 2017-06-05 PROCEDURE — 94640 AIRWAY INHALATION TREATMENT: CPT

## 2017-06-05 PROCEDURE — 85027 COMPLETE CBC AUTOMATED: CPT

## 2017-06-05 PROCEDURE — 87486 CHLMYD PNEUM DNA AMP PROBE: CPT

## 2017-06-05 PROCEDURE — 87400 INFLUENZA A/B EACH AG IA: CPT

## 2017-06-05 PROCEDURE — 83036 HEMOGLOBIN GLYCOSYLATED A1C: CPT

## 2017-06-05 PROCEDURE — 80048 BASIC METABOLIC PNL TOTAL CA: CPT

## 2017-06-05 PROCEDURE — 84145 PROCALCITONIN (PCT): CPT

## 2017-06-05 RX ORDER — IPRATROPIUM/ALBUTEROL SULFATE 18-103MCG
3 AEROSOL WITH ADAPTER (GRAM) INHALATION
Qty: 42 | Refills: 0 | OUTPATIENT
Start: 2017-06-05 | End: 2017-06-19

## 2017-06-05 RX ORDER — LORATADINE 10 MG/1
1 TABLET ORAL
Qty: 30 | Refills: 0 | OUTPATIENT
Start: 2017-06-05 | End: 2017-07-05

## 2017-06-05 RX ORDER — BUDESONIDE AND FORMOTEROL FUMARATE DIHYDRATE 160; 4.5 UG/1; UG/1
1 AEROSOL RESPIRATORY (INHALATION)
Qty: 1 | Refills: 0 | OUTPATIENT
Start: 2017-06-05 | End: 2017-07-05

## 2017-06-05 RX ORDER — MULTIVIT-MIN/FERROUS GLUCONATE 9 MG/15 ML
1 LIQUID (ML) ORAL
Qty: 0 | Refills: 0 | COMMUNITY

## 2017-06-05 RX ORDER — ALBUTEROL 90 UG/1
2.5 AEROSOL, METERED ORAL EVERY 8 HOURS
Qty: 0 | Refills: 0 | Status: DISCONTINUED | OUTPATIENT
Start: 2017-06-05 | End: 2017-06-05

## 2017-06-05 RX ADMIN — ALBUTEROL 2.5 MILLIGRAM(S): 90 AEROSOL, METERED ORAL at 15:43

## 2017-06-05 RX ADMIN — LOSARTAN POTASSIUM 25 MILLIGRAM(S): 100 TABLET, FILM COATED ORAL at 06:27

## 2017-06-05 RX ADMIN — PANTOPRAZOLE SODIUM 40 MILLIGRAM(S): 20 TABLET, DELAYED RELEASE ORAL at 06:27

## 2017-06-05 RX ADMIN — BUDESONIDE AND FORMOTEROL FUMARATE DIHYDRATE 2 PUFF(S): 160; 4.5 AEROSOL RESPIRATORY (INHALATION) at 07:25

## 2017-06-05 RX ADMIN — Medication 40 MILLIGRAM(S): at 06:27

## 2017-06-05 RX ADMIN — Medication 1: at 12:45

## 2017-06-05 RX ADMIN — Medication 25 MILLIGRAM(S): at 06:27

## 2017-06-05 RX ADMIN — Medication 1 TABLET(S): at 11:41

## 2017-06-05 RX ADMIN — ALBUTEROL 2.5 MILLIGRAM(S): 90 AEROSOL, METERED ORAL at 07:58

## 2017-06-05 RX ADMIN — CLOPIDOGREL BISULFATE 75 MILLIGRAM(S): 75 TABLET, FILM COATED ORAL at 11:41

## 2017-06-05 RX ADMIN — LORATADINE 10 MILLIGRAM(S): 10 TABLET ORAL at 11:41

## 2017-06-05 RX ADMIN — HEPARIN SODIUM 5000 UNIT(S): 5000 INJECTION INTRAVENOUS; SUBCUTANEOUS at 06:27

## 2017-06-05 NOTE — PROGRESS NOTE ADULT - PROBLEM SELECTOR PLAN 2
metoprolol  losartan
albuterol TID  Symbicort  Prednisone Systemic  cont to monitor o2  manage comorbidities
metoprolol  losartan
received 2000 ml of NS bolus by ED team, Lactic acid level was normalized down to 1.4 mmol/dl, will maintain at 100 ml/h, monitor I & O, and repeat BUN/Cr in am..

## 2017-06-05 NOTE — DISCHARGE NOTE ADULT - HOSPITAL COURSE
This is an 85 y/o F with PMH of HTN, Aortic Aneurysm, Rheumatic Fever as a kid, Hep-B post transfusion, and Macular Degeneration presented with 8 days H/O productive cough of scanty thick sputum associated with chest wheezing & SOB. Patient was seen by her PMD last Tuesday, had a CXR, was diagnosed with PNA, and was sent home on Z-Pack, and prednisone. Patient states that she just finished the Abx & almost done with the steroid tapering course, yet still not feeling better, so she saw him again, he recommended her to be admitted to the hospital. pt was admitted for acute broncitits secondary to viral syndrome, tolerating titrating off steroid, on duoneb, RA sat 98 % and on ambulation 96%, would need duoneb home will prescribe nebulizer machine, currently pt denies any cp, palpition, sob, abdominal or urinary changes   vitals stable  PE  nad   chest s1, s2  lungs CTA, no wheezing audible  abd:BS+  ext: no pedal edema or cyanosis

## 2017-06-05 NOTE — DISCHARGE NOTE ADULT - PATIENT PORTAL LINK FT
“You can access the [facility] Patient Portal, offered by [facility], by registering with the following website: [email or web address]”

## 2017-06-05 NOTE — DISCHARGE NOTE ADULT - CARE PLAN
Principal Discharge DX:	Asthmatic bronchitis, unspecified asthma severity, uncomplicated  Goal:	secondary to metamenapuris virus, clinically improving  Instructions for follow-up, activity and diet:	RA sat 98 %, on ambulation 96 %  taper steroids  duoneb prn  Secondary Diagnosis:	Essential hypertension  Goal:	stable  Secondary Diagnosis:	Abnormal blood sugar  Goal:	diet control

## 2017-06-05 NOTE — DISCHARGE NOTE ADULT - CARE PROVIDER_API CALL
Jad Cody (MD), Internal Medicine  [address] [place] [address]  Phone: [phone]  Fax: [phone]

## 2017-06-05 NOTE — PROGRESS NOTE ADULT - PROBLEM SELECTOR PROBLEM 1
Asthmatic bronchitis, unspecified asthma severity, uncomplicated
Asthmatic bronchitis
Asthmatic bronchitis, unspecified asthma severity, uncomplicated
Human metapneumovirus (hMPV) as cause of disease classified elsewhere

## 2017-06-05 NOTE — DISCHARGE NOTE ADULT - PLAN OF CARE
secondary to metamenapuris virus, clinically improving RA sat 98 %, on ambulation 96 %  taper steroids  duoneb prn stable diet control

## 2017-06-05 NOTE — PROGRESS NOTE ADULT - PROBLEM SELECTOR PLAN 1
-albuterol nebs, symbiort added today  -steroid taper  -am labs
-albuterol nebs  -steroid taper  -am labs
supportive care  o2  nebs  steroids  hydration  PO intake  out of bed to chair  supportive icu care  transfer out of ICU as pt is hemodynamically stable
with hyperactiev airway disease, did not tolerate outpt treatment  continue on solumderol/levaquin  cxr no active infection visible  continue on claritin  saturating 99 % on 1 L n/C will taper off  albuterol prn

## 2017-06-05 NOTE — DISCHARGE NOTE ADULT - MEDICATION SUMMARY - MEDICATIONS TO TAKE
I will START or STAY ON the medications listed below when I get home from the hospital:    predniSONE 10 mg oral tablet  -- 4 tab(s)  once a day for 5 days  3 tab once a day for 5 days  2 tabs once  a day for 5 days  1 tab once a day for 5 days  stop, please prov  -- Indication: For bronchitits    Benicar 5 mg oral tablet  -- 2 tab(s) by mouth once a day  -- Indication: For HTN (hypertension)    loratadine 10 mg oral tablet  -- 1 tab(s) by mouth once a day  -- Indication: For bronchitits    Plavix 75 mg oral tablet  -- 1 tab(s) by mouth once a day  -- Indication: For cad    Toprol-XL 25 mg oral tablet, extended release  -- 1 tab(s) by mouth once a day  -- Indication: For HTN (hypertension)    budesonide-formoterol 160 mcg-4.5 mcg/inh inhalation aerosol  -- 1 puff(s) inhaled 2 times a day  -- Indication: For brnchitis    DuoNeb 2.5 mg-0.5 mg/3 mL inhalation solution  -- 3 milliliter(s) inhaled 4 times a day  -- For inhalation only.  It is very important that you take or use this exactly as directed.  Do not skip doses or discontinue unless directed by your doctor.  Obtain medical advice before taking any non-prescription drugs as some may affect the action of this medication.    -- Indication: For bronchitis    guaiFENesin 100 mg/5 mL oral liquid  -- 10 milliliter(s) by mouth every 6 hours, As needed, Cough  -- Indication: For cough

## 2017-06-05 NOTE — PROGRESS NOTE ADULT - SUBJECTIVE AND OBJECTIVE BOX
Date/Time Patient Seen:  		  Referring MD:   Data Reviewed	       Patient is a 84y old  Female who presents with a chief complaint of Cough with SOB. (03 Jun 2017 22:09)  on isolation precs  hMPV  on nebs  on steroids  vs and meds reviewed        Subjective/HPI     PAST MEDICAL & SURGICAL HISTORY:  Hepatitis B  Macular degeneration  Aortic aneurysm  HTN (hypertension)  No significant past surgical history        Medication list         MEDICATIONS  (STANDING):  heparin  Injectable 5000Unit(s) SubCutaneous every 12 hours  predniSONE   Tablet 40milliGRAM(s) Oral daily  insulin lispro (HumaLOG) corrective regimen sliding scale  SubCutaneous three times a day before meals  insulin lispro (HumaLOG) corrective regimen sliding scale  SubCutaneous at bedtime  dextrose 5%. 1000milliLiter(s) IV Continuous <Continuous>  dextrose 50% Injectable 12.5Gram(s) IV Push once  dextrose 50% Injectable 25Gram(s) IV Push once  dextrose 50% Injectable 25Gram(s) IV Push once  losartan 25milliGRAM(s) Oral daily  clopidogrel Tablet 75milliGRAM(s) Oral daily  metoprolol succinate ER 25milliGRAM(s) Oral daily  multivitamin/minerals 1Tablet(s) Oral daily  buDESOnide 160 MICROgram(s)/formoterol 4.5 MICROgram(s) Inhaler 2Puff(s) Inhalation two times a day  loratadine 10milliGRAM(s) Oral daily  pantoprazole    Tablet 40milliGRAM(s) Oral before breakfast  ALBUTerol    0.083% 2.5milliGRAM(s) Nebulizer every 8 hours    MEDICATIONS  (PRN):  dextrose Gel 1Dose(s) Oral once PRN Blood Glucose LESS THAN 70 milliGRAM(s)/deciliter  glucagon  Injectable 1milliGRAM(s) IntraMuscular once PRN Glucose LESS THAN 70 milligrams/deciliter  guaiFENesin   Syrup  (Sugar-Free) 200milliGRAM(s) Oral every 6 hours PRN Cough  ALBUTerol    0.083%. 2.5milliGRAM(s) Nebulizer once PRN Shortness of Breath and/or Wheezing         Vitals log        ICU Vital Signs Last 24 Hrs  T(C): 36.6, Max: 36.9 (06-04 @ 12:00)  T(F): 97.8, Max: 98.5 (06-04 @ 12:00)  HR: 71 (60 - 83)  BP: 107/62 (107/62 - 151/76)  BP(mean): 77 (77 - 97)  ABP: --  ABP(mean): --  RR: 14 (9 - 22)  SpO2: 95% (94% - 98%)           Input and Output:  I&O's Detail  I & Os for 24h ending 04 Jun 2017 07:00  =============================================  IN:    Sodium Chloride 0.9% IV Bolus: 2000 ml    sodium chloride 0.9%: 400 ml    Total IN: 2400 ml  ---------------------------------------------  OUT:    Total OUT: 0 ml  ---------------------------------------------  Total NET: 2400 ml    I & Os for current day (as of 05 Jun 2017 05:41)  =============================================  IN:    Oral Fluid: 240 ml    Total IN: 240 ml  ---------------------------------------------  OUT:    Total OUT: 0 ml  ---------------------------------------------  Total NET: 240 ml      Lab Data                        14.8   8.2   )-----------( 242      ( 04 Jun 2017 06:03 )             42.8     06-04    143  |  109<H>  |  19  ----------------------------<  89  4.2   |  27  |  0.84    Ca    8.8      04 Jun 2017 06:03    TPro  8.3  /  Alb  3.7  /  TBili  0.8  /  DBili  x   /  AST  19  /  ALT  31  /  AlkPhos  113  06-03            Review of Systems	      Objective     Physical Examination  heart - s1s2  lungs - dec BS  abd - soft        Pertinent Lab findings & Imaging      Óscar:  NO   Adequate UO     I&O's Detail  I & Os for 24h ending 04 Jun 2017 07:00  =============================================  IN:    Sodium Chloride 0.9% IV Bolus: 2000 ml    sodium chloride 0.9%: 400 ml    Total IN: 2400 ml  ---------------------------------------------  OUT:    Total OUT: 0 ml  ---------------------------------------------  Total NET: 2400 ml    I & Os for current day (as of 05 Jun 2017 05:41)  =============================================  IN:    Oral Fluid: 240 ml    Total IN: 240 ml  ---------------------------------------------  OUT:    Total OUT: 0 ml  ---------------------------------------------  Total NET: 240 ml           Discussed with:     Cultures:	        Radiology

## 2017-06-08 LAB
CULTURE RESULTS: SIGNIFICANT CHANGE UP
CULTURE RESULTS: SIGNIFICANT CHANGE UP
SPECIMEN SOURCE: SIGNIFICANT CHANGE UP
SPECIMEN SOURCE: SIGNIFICANT CHANGE UP

## 2017-07-19 ENCOUNTER — EMERGENCY (EMERGENCY)
Facility: HOSPITAL | Age: 82
LOS: 1 days | Discharge: HOME | End: 2017-07-19
Attending: EMERGENCY MEDICINE | Admitting: EMERGENCY MEDICINE
Payer: MEDICARE

## 2017-07-19 VITALS
HEART RATE: 80 BPM | SYSTOLIC BLOOD PRESSURE: 132 MMHG | RESPIRATION RATE: 16 BRPM | TEMPERATURE: 98 F | OXYGEN SATURATION: 96 % | DIASTOLIC BLOOD PRESSURE: 83 MMHG

## 2017-07-19 VITALS
HEIGHT: 56 IN | SYSTOLIC BLOOD PRESSURE: 166 MMHG | RESPIRATION RATE: 15 BRPM | WEIGHT: 128.97 LBS | OXYGEN SATURATION: 98 % | DIASTOLIC BLOOD PRESSURE: 78 MMHG | TEMPERATURE: 97 F | HEART RATE: 83 BPM

## 2017-07-19 PROBLEM — I71.9 AORTIC ANEURYSM OF UNSPECIFIED SITE, WITHOUT RUPTURE: Chronic | Status: ACTIVE | Noted: 2017-06-03

## 2017-07-19 PROBLEM — I10 ESSENTIAL (PRIMARY) HYPERTENSION: Chronic | Status: ACTIVE | Noted: 2017-06-03

## 2017-07-19 LAB
ALBUMIN SERPL ELPH-MCNC: 3.8 G/DL — SIGNIFICANT CHANGE UP (ref 3.3–5)
ALP SERPL-CCNC: 109 U/L — SIGNIFICANT CHANGE UP (ref 30–120)
ALT FLD-CCNC: 29 U/L DA — SIGNIFICANT CHANGE UP (ref 10–60)
ANION GAP SERPL CALC-SCNC: 5 MMOL/L — SIGNIFICANT CHANGE UP (ref 5–17)
APTT BLD: 32.6 SEC — SIGNIFICANT CHANGE UP (ref 27.5–37.4)
AST SERPL-CCNC: 23 U/L — SIGNIFICANT CHANGE UP (ref 10–40)
BASOPHILS # BLD AUTO: 0.1 K/UL — SIGNIFICANT CHANGE UP (ref 0–0.2)
BASOPHILS NFR BLD AUTO: 1 % — SIGNIFICANT CHANGE UP (ref 0–2)
BILIRUB SERPL-MCNC: 0.8 MG/DL — SIGNIFICANT CHANGE UP (ref 0.2–1.2)
BUN SERPL-MCNC: 24 MG/DL — HIGH (ref 7–23)
CALCIUM SERPL-MCNC: 9.7 MG/DL — SIGNIFICANT CHANGE UP (ref 8.4–10.5)
CHLORIDE SERPL-SCNC: 108 MMOL/L — SIGNIFICANT CHANGE UP (ref 96–108)
CK MB CFR SERPL CALC: 0.9 NG/ML — SIGNIFICANT CHANGE UP (ref 0–3.6)
CO2 SERPL-SCNC: 30 MMOL/L — SIGNIFICANT CHANGE UP (ref 22–31)
CREAT SERPL-MCNC: 1.1 MG/DL — SIGNIFICANT CHANGE UP (ref 0.5–1.3)
EOSINOPHIL # BLD AUTO: 0.1 K/UL — SIGNIFICANT CHANGE UP (ref 0–0.5)
EOSINOPHIL NFR BLD AUTO: 1 % — SIGNIFICANT CHANGE UP (ref 0–6)
GLUCOSE SERPL-MCNC: 131 MG/DL — HIGH (ref 70–99)
HCT VFR BLD CALC: 48.8 % — HIGH (ref 34.5–45)
HGB BLD-MCNC: 15.9 G/DL — HIGH (ref 11.5–15.5)
INR BLD: 1.04 RATIO — SIGNIFICANT CHANGE UP (ref 0.88–1.16)
LYMPHOCYTES # BLD AUTO: 1.6 K/UL — SIGNIFICANT CHANGE UP (ref 1–3.3)
LYMPHOCYTES # BLD AUTO: 19.6 % — SIGNIFICANT CHANGE UP (ref 13–44)
MCHC RBC-ENTMCNC: 29.1 PG — SIGNIFICANT CHANGE UP (ref 27–34)
MCHC RBC-ENTMCNC: 32.5 GM/DL — SIGNIFICANT CHANGE UP (ref 32–36)
MCV RBC AUTO: 89.4 FL — SIGNIFICANT CHANGE UP (ref 80–100)
MONOCYTES # BLD AUTO: 0.4 K/UL — SIGNIFICANT CHANGE UP (ref 0–0.9)
MONOCYTES NFR BLD AUTO: 4.5 % — SIGNIFICANT CHANGE UP (ref 2–14)
NEUTROPHILS # BLD AUTO: 6.1 K/UL — SIGNIFICANT CHANGE UP (ref 1.8–7.4)
NEUTROPHILS NFR BLD AUTO: 73.9 % — SIGNIFICANT CHANGE UP (ref 43–77)
PLATELET # BLD AUTO: 234 K/UL — SIGNIFICANT CHANGE UP (ref 150–400)
POTASSIUM SERPL-MCNC: 4.8 MMOL/L — SIGNIFICANT CHANGE UP (ref 3.5–5.3)
POTASSIUM SERPL-SCNC: 4.8 MMOL/L — SIGNIFICANT CHANGE UP (ref 3.5–5.3)
PROT SERPL-MCNC: 7.7 G/DL — SIGNIFICANT CHANGE UP (ref 6–8.3)
PROTHROM AB SERPL-ACNC: 11.4 SEC — SIGNIFICANT CHANGE UP (ref 9.8–12.7)
RBC # BLD: 5.46 M/UL — HIGH (ref 3.8–5.2)
RBC # FLD: 12.5 % — SIGNIFICANT CHANGE UP (ref 10.3–14.5)
SODIUM SERPL-SCNC: 143 MMOL/L — SIGNIFICANT CHANGE UP (ref 135–145)
TROPONIN I SERPL-MCNC: 0 NG/ML — LOW (ref 0.02–0.06)
WBC # BLD: 8.2 K/UL — SIGNIFICANT CHANGE UP (ref 3.8–10.5)
WBC # FLD AUTO: 8.2 K/UL — SIGNIFICANT CHANGE UP (ref 3.8–10.5)

## 2017-07-19 PROCEDURE — 84484 ASSAY OF TROPONIN QUANT: CPT

## 2017-07-19 PROCEDURE — 70450 CT HEAD/BRAIN W/O DYE: CPT

## 2017-07-19 PROCEDURE — 99285 EMERGENCY DEPT VISIT HI MDM: CPT

## 2017-07-19 PROCEDURE — 85730 THROMBOPLASTIN TIME PARTIAL: CPT

## 2017-07-19 PROCEDURE — 80053 COMPREHEN METABOLIC PANEL: CPT

## 2017-07-19 PROCEDURE — 36415 COLL VENOUS BLD VENIPUNCTURE: CPT

## 2017-07-19 PROCEDURE — 70450 CT HEAD/BRAIN W/O DYE: CPT | Mod: 26

## 2017-07-19 PROCEDURE — 93010 ELECTROCARDIOGRAM REPORT: CPT

## 2017-07-19 PROCEDURE — 85027 COMPLETE CBC AUTOMATED: CPT

## 2017-07-19 PROCEDURE — 85610 PROTHROMBIN TIME: CPT

## 2017-07-19 PROCEDURE — 93005 ELECTROCARDIOGRAM TRACING: CPT

## 2017-07-19 PROCEDURE — 82553 CREATINE MB FRACTION: CPT

## 2017-07-19 PROCEDURE — 99284 EMERGENCY DEPT VISIT MOD MDM: CPT | Mod: 25

## 2017-07-19 RX ORDER — MECLIZINE HCL 12.5 MG
1 TABLET ORAL
Qty: 15 | Refills: 0
Start: 2017-07-19

## 2017-07-19 RX ORDER — SODIUM CHLORIDE 9 MG/ML
1000 INJECTION INTRAMUSCULAR; INTRAVENOUS; SUBCUTANEOUS
Qty: 0 | Refills: 0 | Status: DISCONTINUED | OUTPATIENT
Start: 2017-07-19 | End: 2017-07-23

## 2017-07-19 RX ORDER — MECLIZINE HCL 12.5 MG
25 TABLET ORAL ONCE
Qty: 0 | Refills: 0 | Status: COMPLETED | OUTPATIENT
Start: 2017-07-19 | End: 2017-07-19

## 2017-07-19 RX ADMIN — Medication 25 MILLIGRAM(S): at 12:31

## 2017-07-19 RX ADMIN — SODIUM CHLORIDE 100 MILLILITER(S): 9 INJECTION INTRAMUSCULAR; INTRAVENOUS; SUBCUTANEOUS at 12:30

## 2017-07-19 NOTE — ED PROVIDER NOTE - MEDICAL DECISION MAKING DETAILS
85 yo female co dizziness and vertigo since this am. Now feels somewhat better. PE unrevealing. Plan - Labs, CT, EKG, meclizine, reassess.

## 2017-07-19 NOTE — ED PROVIDER NOTE - PROGRESS NOTE DETAILS
Feels much better. Sitting up in bed. Tolerating PO fluids. Wants to go home. Will FU with Dr. Cody tomorrow. Ambulated without difficulty.

## 2017-07-19 NOTE — ED ADULT NURSE REASSESSMENT NOTE - NS ED NURSE REASSESS COMMENT FT1
1230 seen and examined by MD. Ct head done. Blood drawn and sent to lab, results pending.
1610 Reevaluated by MD. Instructions given to pt and family, with good understanding. Pt discharged via wheelchair.
1500 Awaiting disposition. Pt less dizzy at this time, able to ambulate with assistance. Requesting to eat. Kept comfortable.

## 2017-07-19 NOTE — ED PROVIDER NOTE - CONDUCTED A DETAILED DISCUSSION WITH PATIENT AND/OR GUARDIAN REGARDING, MDM
need for outpatient follow-up/radiology results/lab results lab results/radiology results/need for outpatient follow-up/return to ED if symptoms worsen, persist or questions arise

## 2017-07-19 NOTE — ED PROVIDER NOTE - OBJECTIVE STATEMENT
83 y/o F w/ PMHx of aortic aneurysm, HTN, hepatitis B, viral pneumonia, and macular degeneration presents to the ED c/o dizziness. Pt states that she woke up today dizzy and nauseous, when she went back to bed she felt the room spinning... spinning is now resolved, but states her balance is off. When she sits up she feels lightheaded, feels better laying down. Pt states about 30 years ago she had an episode similar to this. Pt denies fever, vomiting, diarrhea, abd pain, cough, HA or any other complaints at this time.

## 2017-07-24 NOTE — ED ADULT NURSE NOTE - NEURO WDL
DISPLAY PLAN FREE TEXT Alert and oriented to person, place and time, memory intact, behavior appropriate to situation, PERRL.

## 2019-06-21 PROBLEM — B19.10 UNSPECIFIED VIRAL HEPATITIS B WITHOUT HEPATIC COMA: Chronic | Status: ACTIVE | Noted: 2017-06-03

## 2019-09-09 ENCOUNTER — APPOINTMENT (OUTPATIENT)
Dept: INTERNAL MEDICINE | Facility: CLINIC | Age: 84
End: 2019-09-09
Payer: MEDICARE

## 2019-09-09 VITALS
WEIGHT: 130 LBS | DIASTOLIC BLOOD PRESSURE: 80 MMHG | HEART RATE: 65 BPM | BODY MASS INDEX: 29.25 KG/M2 | HEIGHT: 56 IN | OXYGEN SATURATION: 97 % | SYSTOLIC BLOOD PRESSURE: 126 MMHG

## 2019-09-09 PROCEDURE — 99213 OFFICE O/P EST LOW 20 MIN: CPT | Mod: 25

## 2019-09-09 PROCEDURE — 36415 COLL VENOUS BLD VENIPUNCTURE: CPT

## 2019-09-09 NOTE — PHYSICAL EXAM
[No Acute Distress] : no acute distress [Normal Sclera/Conjunctiva] : normal sclera/conjunctiva [Normal Outer Ear/Nose] : the outer ears and nose were normal in appearance [Normal Oropharynx] : the oropharynx was normal [No JVD] : no jugular venous distention [No Lymphadenopathy] : no lymphadenopathy [No Respiratory Distress] : no respiratory distress  [No Accessory Muscle Use] : no accessory muscle use [Clear to Auscultation] : lungs were clear to auscultation bilaterally [Normal Rate] : normal rate  [Regular Rhythm] : with a regular rhythm [No Edema] : there was no peripheral edema [No Extremity Clubbing/Cyanosis] : no extremity clubbing/cyanosis

## 2019-09-09 NOTE — REVIEW OF SYSTEMS
[Joint Stiffness] : joint stiffness [Fever] : no fever [Chills] : no chills [Chest Pain] : no chest pain [Palpitations] : no palpitations [Lower Ext Edema] : no lower extremity edema [Shortness Of Breath] : no shortness of breath [Abdominal Pain] : no abdominal pain [Cough] : no cough [Wheezing] : no wheezing

## 2019-09-10 LAB
25(OH)D3 SERPL-MCNC: 47.2 NG/ML
ALBUMIN SERPL ELPH-MCNC: 4.4 G/DL
ALP BLD-CCNC: 100 U/L
ALT SERPL-CCNC: 18 U/L
ANION GAP SERPL CALC-SCNC: 13 MMOL/L
AST SERPL-CCNC: 24 U/L
BASOPHILS # BLD AUTO: 0.07 K/UL
BASOPHILS NFR BLD AUTO: 1 %
BILIRUB SERPL-MCNC: 0.5 MG/DL
BUN SERPL-MCNC: 23 MG/DL
CALCIUM SERPL-MCNC: 9.9 MG/DL
CHLORIDE SERPL-SCNC: 105 MMOL/L
CHOLEST SERPL-MCNC: 154 MG/DL
CHOLEST/HDLC SERPL: 2.9 RATIO
CO2 SERPL-SCNC: 23 MMOL/L
CREAT SERPL-MCNC: 0.98 MG/DL
EOSINOPHIL # BLD AUTO: 0.32 K/UL
EOSINOPHIL NFR BLD AUTO: 4.7 %
ESTIMATED AVERAGE GLUCOSE: 131 MG/DL
GLUCOSE SERPL-MCNC: 129 MG/DL
HBA1C MFR BLD HPLC: 6.2 %
HCT VFR BLD CALC: 51.4 %
HDLC SERPL-MCNC: 53 MG/DL
HGB BLD-MCNC: 16.1 G/DL
IMM GRANULOCYTES NFR BLD AUTO: 0.3 %
LDLC SERPL CALC-MCNC: 80 MG/DL
LYMPHOCYTES # BLD AUTO: 2.55 K/UL
LYMPHOCYTES NFR BLD AUTO: 37.1 %
MAN DIFF?: NORMAL
MCHC RBC-ENTMCNC: 28.7 PG
MCHC RBC-ENTMCNC: 31.3 GM/DL
MCV RBC AUTO: 91.6 FL
MONOCYTES # BLD AUTO: 0.57 K/UL
MONOCYTES NFR BLD AUTO: 8.3 %
NEUTROPHILS # BLD AUTO: 3.35 K/UL
NEUTROPHILS NFR BLD AUTO: 48.6 %
PLATELET # BLD AUTO: 275 K/UL
POTASSIUM SERPL-SCNC: 5 MMOL/L
PROT SERPL-MCNC: 7 G/DL
RBC # BLD: 5.61 M/UL
RBC # FLD: 14.1 %
SODIUM SERPL-SCNC: 141 MMOL/L
TRIGL SERPL-MCNC: 107 MG/DL
TSH SERPL-ACNC: 1.77 UIU/ML
WBC # FLD AUTO: 6.88 K/UL

## 2019-10-09 ENCOUNTER — RECORD ABSTRACTING (OUTPATIENT)
Age: 84
End: 2019-10-09

## 2019-10-09 DIAGNOSIS — Z87.09 PERSONAL HISTORY OF OTHER DISEASES OF THE RESPIRATORY SYSTEM: ICD-10-CM

## 2019-10-09 DIAGNOSIS — H35.20: ICD-10-CM

## 2019-10-09 DIAGNOSIS — Z86.19 PERSONAL HISTORY OF OTHER INFECTIOUS AND PARASITIC DISEASES: ICD-10-CM

## 2019-10-09 DIAGNOSIS — R42 DIZZINESS AND GIDDINESS: ICD-10-CM

## 2019-10-09 DIAGNOSIS — Z87.39 PERSONAL HISTORY OF OTHER DISEASES OF THE MUSCULOSKELETAL SYSTEM AND CONNECTIVE TISSUE: ICD-10-CM

## 2019-10-09 DIAGNOSIS — Z87.01 PERSONAL HISTORY OF PNEUMONIA (RECURRENT): ICD-10-CM

## 2019-10-09 DIAGNOSIS — Z86.69 PERSONAL HISTORY OF OTHER DISEASES OF THE NERVOUS SYSTEM AND SENSE ORGANS: ICD-10-CM

## 2019-10-09 DIAGNOSIS — J12.3 HUMAN METAPNEUMOVIRUS PNEUMONIA: ICD-10-CM

## 2020-01-06 ENCOUNTER — APPOINTMENT (OUTPATIENT)
Dept: INTERNAL MEDICINE | Facility: CLINIC | Age: 85
End: 2020-01-06
Payer: MEDICARE

## 2020-01-06 VITALS
HEART RATE: 73 BPM | BODY MASS INDEX: 29.25 KG/M2 | HEIGHT: 56 IN | OXYGEN SATURATION: 96 % | WEIGHT: 130 LBS | TEMPERATURE: 97.8 F

## 2020-01-06 VITALS — DIASTOLIC BLOOD PRESSURE: 80 MMHG | SYSTOLIC BLOOD PRESSURE: 128 MMHG

## 2020-01-06 PROCEDURE — 36415 COLL VENOUS BLD VENIPUNCTURE: CPT

## 2020-01-06 PROCEDURE — G0444 DEPRESSION SCREEN ANNUAL: CPT | Mod: 59

## 2020-01-06 PROCEDURE — 99213 OFFICE O/P EST LOW 20 MIN: CPT | Mod: 25

## 2020-01-06 NOTE — HISTORY OF PRESENT ILLNESS
[de-identified] : Patient comes for FBW and med renewal\par  Pt aware of prediabetes  Trying to watch diet

## 2020-01-06 NOTE — PHYSICAL EXAM
[No Acute Distress] : no acute distress [Normal Sclera/Conjunctiva] : normal sclera/conjunctiva [Normal Outer Ear/Nose] : the outer ears and nose were normal in appearance [Normal Oropharynx] : the oropharynx was normal [No JVD] : no jugular venous distention [No Lymphadenopathy] : no lymphadenopathy [No Respiratory Distress] : no respiratory distress  [No Accessory Muscle Use] : no accessory muscle use [Clear to Auscultation] : lungs were clear to auscultation bilaterally [Normal Rate] : normal rate  [Regular Rhythm] : with a regular rhythm [No Carotid Bruits] : no carotid bruits [No Edema] : there was no peripheral edema [Soft] : abdomen soft [No Extremity Clubbing/Cyanosis] : no extremity clubbing/cyanosis [Non Tender] : non-tender [Non-distended] : non-distended

## 2020-01-06 NOTE — HEALTH RISK ASSESSMENT
[] : No [Yes] : Yes [Monthly or less (1 pt)] : Monthly or less (1 point) [Never (0 pts)] : Never (0 points) [1 or 2 (0 pts)] : 1 or 2 (0 points) [No] : In the past 12 months have you used drugs other than those required for medical reasons? No [No falls in past year] : Patient reported no falls in the past year [0] : 2) Feeling down, depressed, or hopeless: Not at all (0) [Audit-CScore] : 0 [[id number]] : 0 [[id number]] : 4

## 2020-01-06 NOTE — REVIEW OF SYSTEMS
[Fever] : no fever [Chills] : no chills [Palpitations] : no palpitations [Lower Ext Edema] : lower extremity edema [Wheezing] : no wheezing [Shortness Of Breath] : no shortness of breath [Cough] : no cough [Abdominal Pain] : no abdominal pain [Nausea] : no nausea [Dysuria] : no dysuria

## 2020-01-07 LAB
25(OH)D3 SERPL-MCNC: 33.9 NG/ML
ALBUMIN SERPL ELPH-MCNC: 4.1 G/DL
ALP BLD-CCNC: 98 U/L
ALT SERPL-CCNC: 19 U/L
ANION GAP SERPL CALC-SCNC: 14 MMOL/L
AST SERPL-CCNC: 20 U/L
BASOPHILS # BLD AUTO: 0.08 K/UL
BASOPHILS NFR BLD AUTO: 1.2 %
BILIRUB SERPL-MCNC: 0.6 MG/DL
BUN SERPL-MCNC: 27 MG/DL
CALCIUM SERPL-MCNC: 9.8 MG/DL
CHLORIDE SERPL-SCNC: 109 MMOL/L
CHOLEST SERPL-MCNC: 165 MG/DL
CHOLEST/HDLC SERPL: 3.2 RATIO
CO2 SERPL-SCNC: 21 MMOL/L
CREAT SERPL-MCNC: 0.97 MG/DL
EOSINOPHIL # BLD AUTO: 0.32 K/UL
EOSINOPHIL NFR BLD AUTO: 4.8 %
ESTIMATED AVERAGE GLUCOSE: 131 MG/DL
GLUCOSE SERPL-MCNC: 136 MG/DL
HBA1C MFR BLD HPLC: 6.2 %
HCT VFR BLD CALC: 48.6 %
HDLC SERPL-MCNC: 52 MG/DL
HGB BLD-MCNC: 15.4 G/DL
IMM GRANULOCYTES NFR BLD AUTO: 0.3 %
LDLC SERPL CALC-MCNC: 80 MG/DL
LYMPHOCYTES # BLD AUTO: 2.33 K/UL
LYMPHOCYTES NFR BLD AUTO: 34.6 %
MAN DIFF?: NORMAL
MCHC RBC-ENTMCNC: 28.6 PG
MCHC RBC-ENTMCNC: 31.7 GM/DL
MCV RBC AUTO: 90.2 FL
MONOCYTES # BLD AUTO: 0.55 K/UL
MONOCYTES NFR BLD AUTO: 8.2 %
NEUTROPHILS # BLD AUTO: 3.43 K/UL
NEUTROPHILS NFR BLD AUTO: 50.9 %
PLATELET # BLD AUTO: 237 K/UL
POTASSIUM SERPL-SCNC: 4.7 MMOL/L
PROT SERPL-MCNC: 7.1 G/DL
RBC # BLD: 5.39 M/UL
RBC # FLD: 13.7 %
SODIUM SERPL-SCNC: 144 MMOL/L
TRIGL SERPL-MCNC: 163 MG/DL
TSH SERPL-ACNC: 2.22 UIU/ML
WBC # FLD AUTO: 6.73 K/UL

## 2020-04-13 ENCOUNTER — APPOINTMENT (OUTPATIENT)
Dept: INTERNAL MEDICINE | Facility: CLINIC | Age: 85
End: 2020-04-13

## 2020-04-20 ENCOUNTER — APPOINTMENT (OUTPATIENT)
Dept: INTERNAL MEDICINE | Facility: CLINIC | Age: 85
End: 2020-04-20

## 2020-06-17 ENCOUNTER — APPOINTMENT (OUTPATIENT)
Dept: INTERNAL MEDICINE | Facility: CLINIC | Age: 85
End: 2020-06-17
Payer: MEDICARE

## 2020-06-17 VITALS — DIASTOLIC BLOOD PRESSURE: 82 MMHG | SYSTOLIC BLOOD PRESSURE: 138 MMHG

## 2020-06-17 VITALS — HEART RATE: 70 BPM | OXYGEN SATURATION: 97 %

## 2020-06-17 DIAGNOSIS — Z20.828 CONTACT WITH AND (SUSPECTED) EXPOSURE TO OTHER VIRAL COMMUNICABLE DISEASES: ICD-10-CM

## 2020-06-17 PROCEDURE — 99213 OFFICE O/P EST LOW 20 MIN: CPT | Mod: 25

## 2020-06-17 PROCEDURE — 36415 COLL VENOUS BLD VENIPUNCTURE: CPT

## 2020-06-17 NOTE — REVIEW OF SYSTEMS
[Anxiety] : anxiety [Fever] : no fever [Chills] : no chills [Chest Pain] : no chest pain [Palpitations] : no palpitations [Lower Ext Edema] : no lower extremity edema [Shortness Of Breath] : no shortness of breath [Wheezing] : no wheezing [Cough] : no cough [Abdominal Pain] : no abdominal pain [de-identified] : anxious about covid

## 2020-06-17 NOTE — PHYSICAL EXAM
[No Acute Distress] : no acute distress [Well Nourished] : well nourished [Normal Sclera/Conjunctiva] : normal sclera/conjunctiva [PERRL] : pupils equal round and reactive to light [Normal Outer Ear/Nose] : the outer ears and nose were normal in appearance [No Respiratory Distress] : no respiratory distress  [No JVD] : no jugular venous distention [No Accessory Muscle Use] : no accessory muscle use [Normal Rate] : normal rate  [Clear to Auscultation] : lungs were clear to auscultation bilaterally [Regular Rhythm] : with a regular rhythm [No Extremity Clubbing/Cyanosis] : no extremity clubbing/cyanosis [No Edema] : there was no peripheral edema [Non Tender] : non-tender

## 2020-06-19 LAB
25(OH)D3 SERPL-MCNC: 36 NG/ML
ALBUMIN SERPL ELPH-MCNC: 4.5 G/DL
ALP BLD-CCNC: 110 U/L
ALT SERPL-CCNC: 22 U/L
ANION GAP SERPL CALC-SCNC: 13 MMOL/L
AST SERPL-CCNC: 26 U/L
BASOPHILS # BLD AUTO: 0.07 K/UL
BASOPHILS NFR BLD AUTO: 0.9 %
BILIRUB SERPL-MCNC: 0.6 MG/DL
BUN SERPL-MCNC: 23 MG/DL
CALCIUM SERPL-MCNC: 9.7 MG/DL
CHLORIDE SERPL-SCNC: 103 MMOL/L
CHOLEST SERPL-MCNC: 157 MG/DL
CHOLEST/HDLC SERPL: 2.9 RATIO
CO2 SERPL-SCNC: 24 MMOL/L
CREAT SERPL-MCNC: 0.97 MG/DL
EOSINOPHIL # BLD AUTO: 0.23 K/UL
EOSINOPHIL NFR BLD AUTO: 3.1 %
ESTIMATED AVERAGE GLUCOSE: 131 MG/DL
GLUCOSE SERPL-MCNC: 105 MG/DL
HBA1C MFR BLD HPLC: 6.2 %
HCT VFR BLD CALC: 49.8 %
HDLC SERPL-MCNC: 54 MG/DL
HGB BLD-MCNC: 15.9 G/DL
IMM GRANULOCYTES NFR BLD AUTO: 0.4 %
LDLC SERPL CALC-MCNC: 75 MG/DL
LYMPHOCYTES # BLD AUTO: 2.23 K/UL
LYMPHOCYTES NFR BLD AUTO: 30.1 %
MAN DIFF?: NORMAL
MCHC RBC-ENTMCNC: 29.1 PG
MCHC RBC-ENTMCNC: 31.9 GM/DL
MCV RBC AUTO: 91 FL
MONOCYTES # BLD AUTO: 0.6 K/UL
MONOCYTES NFR BLD AUTO: 8.1 %
NEUTROPHILS # BLD AUTO: 4.26 K/UL
NEUTROPHILS NFR BLD AUTO: 57.4 %
PLATELET # BLD AUTO: 283 K/UL
POTASSIUM SERPL-SCNC: 5.1 MMOL/L
PROT SERPL-MCNC: 7.4 G/DL
RBC # BLD: 5.47 M/UL
RBC # FLD: 14.1 %
SARS-COV-2 IGG SERPL IA-ACNC: 0.1 INDEX
SARS-COV-2 IGG SERPL QL IA: NEGATIVE
SODIUM SERPL-SCNC: 140 MMOL/L
TRIGL SERPL-MCNC: 142 MG/DL
TSH SERPL-ACNC: 1.61 UIU/ML
WBC # FLD AUTO: 7.42 K/UL

## 2020-07-02 ENCOUNTER — RX RENEWAL (OUTPATIENT)
Age: 85
End: 2020-07-02

## 2020-09-18 ENCOUNTER — APPOINTMENT (OUTPATIENT)
Dept: INTERNAL MEDICINE | Facility: CLINIC | Age: 85
End: 2020-09-18
Payer: MEDICARE

## 2020-09-18 VITALS
HEIGHT: 56 IN | OXYGEN SATURATION: 98 % | BODY MASS INDEX: 29.47 KG/M2 | TEMPERATURE: 97.9 F | WEIGHT: 131 LBS | HEART RATE: 71 BPM

## 2020-09-18 VITALS — SYSTOLIC BLOOD PRESSURE: 138 MMHG | DIASTOLIC BLOOD PRESSURE: 78 MMHG

## 2020-09-18 PROCEDURE — G0008: CPT

## 2020-09-18 PROCEDURE — 36415 COLL VENOUS BLD VENIPUNCTURE: CPT

## 2020-09-18 PROCEDURE — 99213 OFFICE O/P EST LOW 20 MIN: CPT | Mod: 25

## 2020-09-18 PROCEDURE — 90662 IIV NO PRSV INCREASED AG IM: CPT

## 2020-09-18 NOTE — REVIEW OF SYSTEMS
[Joint Pain] : joint pain [Joint Stiffness] : joint stiffness [Back Pain] : back pain [Fever] : no fever [Chills] : no chills [Chest Pain] : no chest pain [Palpitations] : no palpitations [Lower Ext Edema] : no lower extremity edema [Shortness Of Breath] : no shortness of breath [Wheezing] : no wheezing [Cough] : no cough [Abdominal Pain] : no abdominal pain

## 2020-09-18 NOTE — PHYSICAL EXAM
[No Acute Distress] : no acute distress [Normal Sclera/Conjunctiva] : normal sclera/conjunctiva [PERRL] : pupils equal round and reactive to light [Normal Outer Ear/Nose] : the outer ears and nose were normal in appearance [Normal Oropharynx] : the oropharynx was normal [No JVD] : no jugular venous distention [No Lymphadenopathy] : no lymphadenopathy [No Respiratory Distress] : no respiratory distress  [No Accessory Muscle Use] : no accessory muscle use [Clear to Auscultation] : lungs were clear to auscultation bilaterally [Normal Rate] : normal rate  [Regular Rhythm] : with a regular rhythm [Normal S1, S2] : normal S1 and S2 [No Edema] : there was no peripheral edema [No Extremity Clubbing/Cyanosis] : no extremity clubbing/cyanosis [Soft] : abdomen soft [Non Tender] : non-tender

## 2020-09-21 LAB
25(OH)D3 SERPL-MCNC: 34 NG/ML
ALBUMIN SERPL ELPH-MCNC: 4.3 G/DL
ALP BLD-CCNC: 106 U/L
ALT SERPL-CCNC: 19 U/L
ANION GAP SERPL CALC-SCNC: 14 MMOL/L
AST SERPL-CCNC: 25 U/L
BASOPHILS # BLD AUTO: 0.08 K/UL
BASOPHILS NFR BLD AUTO: 0.9 %
BILIRUB SERPL-MCNC: 0.4 MG/DL
BUN SERPL-MCNC: 27 MG/DL
CALCIUM SERPL-MCNC: 9.8 MG/DL
CHLORIDE SERPL-SCNC: 105 MMOL/L
CHOLEST SERPL-MCNC: 155 MG/DL
CHOLEST/HDLC SERPL: 3.7 RATIO
CO2 SERPL-SCNC: 21 MMOL/L
CREAT SERPL-MCNC: 1.08 MG/DL
EOSINOPHIL # BLD AUTO: 0.27 K/UL
EOSINOPHIL NFR BLD AUTO: 2.9 %
ESTIMATED AVERAGE GLUCOSE: 140 MG/DL
GLUCOSE SERPL-MCNC: 79 MG/DL
HBA1C MFR BLD HPLC: 6.5 %
HCT VFR BLD CALC: 50.2 %
HDLC SERPL-MCNC: 41 MG/DL
HGB BLD-MCNC: 15.6 G/DL
IMM GRANULOCYTES NFR BLD AUTO: 0.8 %
LDLC SERPL CALC-MCNC: 62 MG/DL
LYMPHOCYTES # BLD AUTO: 2.58 K/UL
LYMPHOCYTES NFR BLD AUTO: 28 %
MAN DIFF?: NORMAL
MCHC RBC-ENTMCNC: 28.8 PG
MCHC RBC-ENTMCNC: 31.1 GM/DL
MCV RBC AUTO: 92.8 FL
MONOCYTES # BLD AUTO: 0.71 K/UL
MONOCYTES NFR BLD AUTO: 7.7 %
NEUTROPHILS # BLD AUTO: 5.51 K/UL
NEUTROPHILS NFR BLD AUTO: 59.7 %
PLATELET # BLD AUTO: 340 K/UL
POTASSIUM SERPL-SCNC: 5.3 MMOL/L
PROT SERPL-MCNC: 7.2 G/DL
RBC # BLD: 5.41 M/UL
RBC # FLD: 14 %
SARS-COV-2 IGG SERPL IA-ACNC: <0.1 INDEX
SARS-COV-2 IGG SERPL QL IA: NEGATIVE
SODIUM SERPL-SCNC: 141 MMOL/L
TRIGL SERPL-MCNC: 259 MG/DL
TSH SERPL-ACNC: 0.77 UIU/ML
WBC # FLD AUTO: 9.22 K/UL

## 2020-10-09 ENCOUNTER — RX RENEWAL (OUTPATIENT)
Age: 85
End: 2020-10-09

## 2020-11-18 ENCOUNTER — RX RENEWAL (OUTPATIENT)
Age: 85
End: 2020-11-18

## 2020-12-18 ENCOUNTER — APPOINTMENT (OUTPATIENT)
Dept: INTERNAL MEDICINE | Facility: CLINIC | Age: 85
End: 2020-12-18
Payer: MEDICARE

## 2020-12-18 VITALS — SYSTOLIC BLOOD PRESSURE: 132 MMHG | DIASTOLIC BLOOD PRESSURE: 82 MMHG

## 2020-12-18 VITALS
RESPIRATION RATE: 14 BRPM | TEMPERATURE: 98 F | OXYGEN SATURATION: 98 % | HEIGHT: 56 IN | HEART RATE: 70 BPM | BODY MASS INDEX: 29.88 KG/M2 | WEIGHT: 132.8 LBS

## 2020-12-18 PROCEDURE — 36415 COLL VENOUS BLD VENIPUNCTURE: CPT

## 2020-12-18 PROCEDURE — 99213 OFFICE O/P EST LOW 20 MIN: CPT | Mod: 25

## 2020-12-18 NOTE — HISTORY OF PRESENT ILLNESS
[de-identified] : Pt comes for bp checkand FBW and med renewal  Saw cardio and pulm   feels well except for left eye

## 2020-12-18 NOTE — REVIEW OF SYSTEMS
[Fatigue] : fatigue [Fever] : no fever [Chills] : no chills [Chest Pain] : no chest pain [Palpitations] : no palpitations [Lower Ext Edema] : no lower extremity edema [Shortness Of Breath] : no shortness of breath [Wheezing] : no wheezing [Cough] : no cough [Abdominal Pain] : no abdominal pain

## 2020-12-21 LAB
25(OH)D3 SERPL-MCNC: 35.8 NG/ML
ALBUMIN SERPL ELPH-MCNC: 4.2 G/DL
ALP BLD-CCNC: 121 U/L
ALT SERPL-CCNC: 21 U/L
ANION GAP SERPL CALC-SCNC: 12 MMOL/L
AST SERPL-CCNC: 24 U/L
BASOPHILS # BLD AUTO: 0.06 K/UL
BASOPHILS NFR BLD AUTO: 0.7 %
BILIRUB SERPL-MCNC: 0.6 MG/DL
BUN SERPL-MCNC: 30 MG/DL
CALCIUM SERPL-MCNC: 9.7 MG/DL
CHLORIDE SERPL-SCNC: 102 MMOL/L
CHOLEST SERPL-MCNC: 156 MG/DL
CO2 SERPL-SCNC: 24 MMOL/L
CREAT SERPL-MCNC: 1.17 MG/DL
EOSINOPHIL # BLD AUTO: 0.24 K/UL
EOSINOPHIL NFR BLD AUTO: 2.9 %
ESTIMATED AVERAGE GLUCOSE: 134 MG/DL
GLUCOSE SERPL-MCNC: 120 MG/DL
HBA1C MFR BLD HPLC: 6.3 %
HCT VFR BLD CALC: 50.3 %
HDLC SERPL-MCNC: 46 MG/DL
HGB BLD-MCNC: 16.1 G/DL
IMM GRANULOCYTES NFR BLD AUTO: 0.4 %
LDLC SERPL CALC-MCNC: 69 MG/DL
LYMPHOCYTES # BLD AUTO: 1.97 K/UL
LYMPHOCYTES NFR BLD AUTO: 23.8 %
MAN DIFF?: NORMAL
MCHC RBC-ENTMCNC: 29.1 PG
MCHC RBC-ENTMCNC: 32 GM/DL
MCV RBC AUTO: 90.8 FL
MONOCYTES # BLD AUTO: 0.66 K/UL
MONOCYTES NFR BLD AUTO: 8 %
NEUTROPHILS # BLD AUTO: 5.3 K/UL
NEUTROPHILS NFR BLD AUTO: 64.2 %
NONHDLC SERPL-MCNC: 109 MG/DL
PLATELET # BLD AUTO: 296 K/UL
POTASSIUM SERPL-SCNC: 5 MMOL/L
PROT SERPL-MCNC: 7.2 G/DL
RBC # BLD: 5.54 M/UL
RBC # FLD: 13.8 %
SODIUM SERPL-SCNC: 139 MMOL/L
TRIGL SERPL-MCNC: 202 MG/DL
TSH SERPL-ACNC: 0.8 UIU/ML
WBC # FLD AUTO: 8.26 K/UL

## 2021-03-12 ENCOUNTER — APPOINTMENT (OUTPATIENT)
Dept: INTERNAL MEDICINE | Facility: CLINIC | Age: 86
End: 2021-03-12
Payer: MEDICARE

## 2021-03-12 VITALS — DIASTOLIC BLOOD PRESSURE: 82 MMHG | SYSTOLIC BLOOD PRESSURE: 138 MMHG

## 2021-03-12 VITALS
OXYGEN SATURATION: 98 % | HEART RATE: 75 BPM | TEMPERATURE: 98 F | BODY MASS INDEX: 29.25 KG/M2 | HEIGHT: 56 IN | WEIGHT: 130 LBS

## 2021-03-12 DIAGNOSIS — Z00.00 ENCOUNTER FOR GENERAL ADULT MEDICAL EXAMINATION W/OUT ABNORMAL FINDINGS: ICD-10-CM

## 2021-03-12 PROCEDURE — 36415 COLL VENOUS BLD VENIPUNCTURE: CPT

## 2021-03-12 PROCEDURE — G0439: CPT

## 2021-03-12 PROCEDURE — 99213 OFFICE O/P EST LOW 20 MIN: CPT | Mod: 25

## 2021-03-12 NOTE — HEALTH RISK ASSESSMENT
[No] : In the past 12 months have you used drugs other than those required for medical reasons? No [No falls in past year] : Patient reported no falls in the past year [0] : 2) Feeling down, depressed, or hopeless: Not at all (0) [With Significant Other] : lives with significant other [] :  [Feels Safe at Home] : Feels safe at home [Fully functional (bathing, dressing, toileting, transferring, walking, feeding)] : Fully functional (bathing, dressing, toileting, transferring, walking, feeding) [Fully functional (using the telephone, shopping, preparing meals, housekeeping, doing laundry, using] : Fully functional and needs no help or supervision to perform IADLs (using the telephone, shopping, preparing meals, housekeeping, doing laundry, using transportation, managing medications and managing finances) [Reports changes in vision] : Reports changes in vision [Reports normal functional visual acuity (ie: able to read med bottle)] : Reports normal functional visual acuity [With Patient/Caregiver] : With Patient/Caregiver [Designated Healthcare Proxy] : Designated healthcare proxy [Name: ___] : Health Care Proxy's Name: [unfilled]  [Relationship: ___] : Relationship: [unfilled] [I will adhere to the patient's wishes as expressed in the advance directive except as noted below.] : I will adhere to the patient's wishes as expressed in the advance directive except as noted below [] : No [Audit-CScore] : 0 [[id number]] : 0 [[id number]] : 0 [Change in mental status noted] : No change in mental status noted [Language] : denies difficulty with language [Behavior] : denies difficulty with behavior [Learning/Retaining New Information] : denies difficulty learning/retaining new information [Handling Complex Tasks] : denies difficulty handling complex tasks [Reasoning] : denies difficulty with reasoning [Spatial Ability and Orientation] : denies difficulty with spatial ability and orientation [Reports changes in hearing] : Reports no changes in hearing [Reports changes in dental health] : Reports no changes in dental health [de-identified] : vision [de-identified] : cannot drive [de-identified] : not in left eye [AdvancecareDate] : 03/21 [FreeTextEntry4] : Pt has proxy at home

## 2021-03-12 NOTE — REVIEW OF SYSTEMS
[Vision Problems] : vision problems [Fever] : no fever [Chills] : no chills [Earache] : no earache [Sore Throat] : no sore throat [Chest Pain] : no chest pain [Palpitations] : no palpitations [Lower Ext Edema] : no lower extremity edema [Shortness Of Breath] : no shortness of breath [Wheezing] : no wheezing [Cough] : no cough [Abdominal Pain] : no abdominal pain [Depression] : no depression

## 2021-03-12 NOTE — PHYSICAL EXAM
[No Acute Distress] : no acute distress [Normal Outer Ear/Nose] : the outer ears and nose were normal in appearance [No JVD] : no jugular venous distention [No Respiratory Distress] : no respiratory distress  [No Accessory Muscle Use] : no accessory muscle use [Clear to Auscultation] : lungs were clear to auscultation bilaterally [Normal Rate] : normal rate  [Regular Rhythm] : with a regular rhythm [Normal S1, S2] : normal S1 and S2 [No Edema] : there was no peripheral edema [No Extremity Clubbing/Cyanosis] : no extremity clubbing/cyanosis [Soft] : abdomen soft [Non Tender] : non-tender [Non-distended] : non-distended [No Masses] : no abdominal mass palpated [No Focal Deficits] : no focal deficits [Normal Affect] : the affect was normal [Normal Insight/Judgement] : insight and judgment were intact [de-identified] : left eye opacification

## 2021-03-12 NOTE — HISTORY OF PRESENT ILLNESS
[de-identified] : Pt comes for FBW and med renewal and annualmedicare well visit  Pt see 3 opht Dr Sandrita Pierce and Dr Armas

## 2021-03-13 LAB
25(OH)D3 SERPL-MCNC: 30.2 NG/ML
ALBUMIN SERPL ELPH-MCNC: 4.2 G/DL
ALP BLD-CCNC: 147 U/L
ALT SERPL-CCNC: 25 U/L
ANION GAP SERPL CALC-SCNC: 13 MMOL/L
AST SERPL-CCNC: 24 U/L
BASOPHILS # BLD AUTO: 0.06 K/UL
BASOPHILS NFR BLD AUTO: 0.7 %
BILIRUB SERPL-MCNC: 0.5 MG/DL
BUN SERPL-MCNC: 28 MG/DL
CALCIUM SERPL-MCNC: 9.6 MG/DL
CHLORIDE SERPL-SCNC: 105 MMOL/L
CHOLEST SERPL-MCNC: 152 MG/DL
CO2 SERPL-SCNC: 22 MMOL/L
CREAT SERPL-MCNC: 1.04 MG/DL
EOSINOPHIL # BLD AUTO: 0.4 K/UL
EOSINOPHIL NFR BLD AUTO: 4.5 %
ESTIMATED AVERAGE GLUCOSE: 140 MG/DL
GLUCOSE SERPL-MCNC: 142 MG/DL
HBA1C MFR BLD HPLC: 6.5 %
HCT VFR BLD CALC: 47.4 %
HDLC SERPL-MCNC: 44 MG/DL
HGB BLD-MCNC: 15 G/DL
IMM GRANULOCYTES NFR BLD AUTO: 0.3 %
LDLC SERPL CALC-MCNC: 69 MG/DL
LYMPHOCYTES # BLD AUTO: 2.38 K/UL
LYMPHOCYTES NFR BLD AUTO: 27 %
MAN DIFF?: NORMAL
MCHC RBC-ENTMCNC: 28.4 PG
MCHC RBC-ENTMCNC: 31.6 GM/DL
MCV RBC AUTO: 89.6 FL
MONOCYTES # BLD AUTO: 0.54 K/UL
MONOCYTES NFR BLD AUTO: 6.1 %
NEUTROPHILS # BLD AUTO: 5.41 K/UL
NEUTROPHILS NFR BLD AUTO: 61.4 %
NONHDLC SERPL-MCNC: 108 MG/DL
PLATELET # BLD AUTO: 301 K/UL
POTASSIUM SERPL-SCNC: 5.1 MMOL/L
PROT SERPL-MCNC: 6.9 G/DL
RBC # BLD: 5.29 M/UL
RBC # FLD: 14.1 %
SODIUM SERPL-SCNC: 140 MMOL/L
TRIGL SERPL-MCNC: 197 MG/DL
TSH SERPL-ACNC: 0.99 UIU/ML
URATE SERPL-MCNC: 5.6 MG/DL
WBC # FLD AUTO: 8.82 K/UL

## 2021-06-11 ENCOUNTER — APPOINTMENT (OUTPATIENT)
Dept: INTERNAL MEDICINE | Facility: CLINIC | Age: 86
End: 2021-06-11
Payer: MEDICARE

## 2021-06-11 VITALS
RESPIRATION RATE: 14 BRPM | WEIGHT: 130 LBS | BODY MASS INDEX: 29.25 KG/M2 | OXYGEN SATURATION: 99 % | HEIGHT: 56 IN | TEMPERATURE: 97.8 F | HEART RATE: 63 BPM

## 2021-06-11 VITALS — SYSTOLIC BLOOD PRESSURE: 138 MMHG | DIASTOLIC BLOOD PRESSURE: 82 MMHG

## 2021-06-11 DIAGNOSIS — M81.0 AGE-RELATED OSTEOPOROSIS W/OUT CURRENT PATHOLOGICAL FRACTURE: ICD-10-CM

## 2021-06-11 PROCEDURE — 99214 OFFICE O/P EST MOD 30 MIN: CPT | Mod: 25

## 2021-06-11 PROCEDURE — 36415 COLL VENOUS BLD VENIPUNCTURE: CPT

## 2021-06-11 NOTE — PHYSICAL EXAM
[No Acute Distress] : no acute distress [Normal Sclera/Conjunctiva] : normal sclera/conjunctiva [Normal Outer Ear/Nose] : the outer ears and nose were normal in appearance [No JVD] : no jugular venous distention [No Lymphadenopathy] : no lymphadenopathy [Supple] : supple [No Respiratory Distress] : no respiratory distress  [No Accessory Muscle Use] : no accessory muscle use [Clear to Auscultation] : lungs were clear to auscultation bilaterally [Normal Rate] : normal rate  [Regular Rhythm] : with a regular rhythm [No Edema] : there was no peripheral edema [No Extremity Clubbing/Cyanosis] : no extremity clubbing/cyanosis [Soft] : abdomen soft [Non Tender] : non-tender [Non-distended] : non-distended [No Spinal Tenderness] : no spinal tenderness [Grossly Normal Strength/Tone] : grossly normal strength/tone

## 2021-06-11 NOTE — REVIEW OF SYSTEMS
[Joint Stiffness] : joint stiffness [Back Pain] : back pain [Fever] : no fever [Chills] : no chills [Chest Pain] : no chest pain [Palpitations] : no palpitations [Lower Ext Edema] : no lower extremity edema [Shortness Of Breath] : no shortness of breath [Wheezing] : no wheezing [Cough] : no cough [Abdominal Pain] : no abdominal pain [Joint Swelling] : no joint swelling

## 2021-06-11 NOTE — HISTORY OF PRESENT ILLNESS
[de-identified] : Pt comes for bp check and FBW  Had high hgba1c of 6.5 last visit.  Having some arthritis pains  Helped by tylenol or mobic

## 2021-06-14 LAB
ESTIMATED AVERAGE GLUCOSE: 140 MG/DL
HBA1C MFR BLD HPLC: 6.5 %

## 2021-09-10 ENCOUNTER — APPOINTMENT (OUTPATIENT)
Dept: INTERNAL MEDICINE | Facility: CLINIC | Age: 86
End: 2021-09-10
Payer: MEDICARE

## 2021-09-10 VITALS — DIASTOLIC BLOOD PRESSURE: 82 MMHG | SYSTOLIC BLOOD PRESSURE: 134 MMHG

## 2021-09-10 VITALS
HEART RATE: 68 BPM | HEIGHT: 56 IN | WEIGHT: 132 LBS | OXYGEN SATURATION: 98 % | BODY MASS INDEX: 29.69 KG/M2 | TEMPERATURE: 98 F

## 2021-09-10 DIAGNOSIS — Z23 ENCOUNTER FOR IMMUNIZATION: ICD-10-CM

## 2021-09-10 PROCEDURE — 36415 COLL VENOUS BLD VENIPUNCTURE: CPT

## 2021-09-10 PROCEDURE — G0008: CPT

## 2021-09-10 PROCEDURE — 99214 OFFICE O/P EST MOD 30 MIN: CPT | Mod: 25

## 2021-09-10 PROCEDURE — 90662 IIV NO PRSV INCREASED AG IM: CPT

## 2021-09-10 NOTE — REVIEW OF SYSTEMS
[Joint Pain] : joint pain [Joint Stiffness] : joint stiffness [Fever] : no fever [Chills] : no chills [Chest Pain] : no chest pain [Palpitations] : no palpitations [Lower Ext Edema] : no lower extremity edema [Shortness Of Breath] : no shortness of breath [Wheezing] : no wheezing [Cough] : no cough [Abdominal Pain] : no abdominal pain

## 2021-09-10 NOTE — HISTORY OF PRESENT ILLNESS
[de-identified] : Pt comes for FBW and med renewal  Pt feels well except for arthritis.  Pt has CKD 3 a stable and labs drawn today

## 2021-09-10 NOTE — PHYSICAL EXAM
[No Acute Distress] : no acute distress [Well Nourished] : well nourished [Normal Sclera/Conjunctiva] : normal sclera/conjunctiva [PERRL] : pupils equal round and reactive to light [Normal Outer Ear/Nose] : the outer ears and nose were normal in appearance [No JVD] : no jugular venous distention [No Lymphadenopathy] : no lymphadenopathy [No Respiratory Distress] : no respiratory distress  [No Accessory Muscle Use] : no accessory muscle use [Clear to Auscultation] : lungs were clear to auscultation bilaterally [Normal Rate] : normal rate  [No Edema] : there was no peripheral edema [No Extremity Clubbing/Cyanosis] : no extremity clubbing/cyanosis [Soft] : abdomen soft [Non Tender] : non-tender [Non-distended] : non-distended [No Spinal Tenderness] : no spinal tenderness [Grossly Normal Strength/Tone] : grossly normal strength/tone

## 2021-09-14 LAB
ALBUMIN SERPL ELPH-MCNC: 4.3 G/DL
ALP BLD-CCNC: 165 U/L
ALT SERPL-CCNC: 26 U/L
ANION GAP SERPL CALC-SCNC: 12 MMOL/L
AST SERPL-CCNC: 26 U/L
BASOPHILS # BLD AUTO: 0.08 K/UL
BASOPHILS NFR BLD AUTO: 1 %
BILIRUB SERPL-MCNC: 0.6 MG/DL
BUN SERPL-MCNC: 23 MG/DL
CALCIUM SERPL-MCNC: 9.7 MG/DL
CHLORIDE SERPL-SCNC: 103 MMOL/L
CHOLEST SERPL-MCNC: 161 MG/DL
CO2 SERPL-SCNC: 25 MMOL/L
CREAT SERPL-MCNC: 1.2 MG/DL
EOSINOPHIL # BLD AUTO: 0.24 K/UL
EOSINOPHIL NFR BLD AUTO: 3.1 %
ESTIMATED AVERAGE GLUCOSE: 143 MG/DL
GLUCOSE SERPL-MCNC: 107 MG/DL
HBA1C MFR BLD HPLC: 6.6 %
HCT VFR BLD CALC: 50 %
HDLC SERPL-MCNC: 48 MG/DL
HGB BLD-MCNC: 15.5 G/DL
IMM GRANULOCYTES NFR BLD AUTO: 0.3 %
LDLC SERPL CALC-MCNC: 74 MG/DL
LYMPHOCYTES # BLD AUTO: 2.49 K/UL
LYMPHOCYTES NFR BLD AUTO: 32.7 %
MAN DIFF?: NORMAL
MCHC RBC-ENTMCNC: 29.1 PG
MCHC RBC-ENTMCNC: 31 GM/DL
MCV RBC AUTO: 93.8 FL
MONOCYTES # BLD AUTO: 0.57 K/UL
MONOCYTES NFR BLD AUTO: 7.5 %
NEUTROPHILS # BLD AUTO: 4.22 K/UL
NEUTROPHILS NFR BLD AUTO: 55.4 %
NONHDLC SERPL-MCNC: 113 MG/DL
PLATELET # BLD AUTO: 264 K/UL
POTASSIUM SERPL-SCNC: 5.2 MMOL/L
PROT SERPL-MCNC: 7.2 G/DL
RBC # BLD: 5.33 M/UL
RBC # FLD: 13.9 %
SODIUM SERPL-SCNC: 140 MMOL/L
TRIGL SERPL-MCNC: 197 MG/DL
TSH SERPL-ACNC: 0.85 UIU/ML
WBC # FLD AUTO: 7.62 K/UL

## 2021-12-06 ENCOUNTER — APPOINTMENT (OUTPATIENT)
Dept: INTERNAL MEDICINE | Facility: CLINIC | Age: 86
End: 2021-12-06
Payer: MEDICARE

## 2021-12-06 VITALS
RESPIRATION RATE: 14 BRPM | OXYGEN SATURATION: 98 % | TEMPERATURE: 97.1 F | WEIGHT: 134 LBS | BODY MASS INDEX: 30.14 KG/M2 | HEART RATE: 94 BPM | HEIGHT: 56 IN

## 2021-12-06 VITALS — DIASTOLIC BLOOD PRESSURE: 82 MMHG | SYSTOLIC BLOOD PRESSURE: 132 MMHG

## 2021-12-06 PROCEDURE — 99214 OFFICE O/P EST MOD 30 MIN: CPT | Mod: 25

## 2021-12-06 PROCEDURE — 36415 COLL VENOUS BLD VENIPUNCTURE: CPT

## 2021-12-06 NOTE — HISTORY OF PRESENT ILLNESS
[de-identified] : Pt comes for FBW andmed renewal  Pt feels well  Received covid vaccine and booster

## 2021-12-06 NOTE — REVIEW OF SYSTEMS
[Fever] : no fever [Chills] : no chills [Chest Pain] : no chest pain [Palpitations] : no palpitations [Lower Ext Edema] : no lower extremity edema [Shortness Of Breath] : no shortness of breath [Wheezing] : no wheezing [Cough] : no cough [Abdominal Pain] : no abdominal pain [Vomiting] : no vomiting [Dysuria] : no dysuria [Hematuria] : no hematuria [Joint Pain] : no joint pain [Joint Stiffness] : no joint stiffness [Unsteady Walking] : no ataxia [Anxiety] : no anxiety [Depression] : no depression

## 2021-12-07 LAB
ALBUMIN SERPL ELPH-MCNC: 4 G/DL
ALP BLD-CCNC: 157 U/L
ALT SERPL-CCNC: 24 U/L
ANION GAP SERPL CALC-SCNC: 13 MMOL/L
AST SERPL-CCNC: 25 U/L
BASOPHILS # BLD AUTO: 0.06 K/UL
BASOPHILS NFR BLD AUTO: 0.7 %
BILIRUB SERPL-MCNC: 0.7 MG/DL
BUN SERPL-MCNC: 22 MG/DL
CALCIUM SERPL-MCNC: 9.8 MG/DL
CHLORIDE SERPL-SCNC: 106 MMOL/L
CHOLEST SERPL-MCNC: 158 MG/DL
CO2 SERPL-SCNC: 22 MMOL/L
CREAT SERPL-MCNC: 1.15 MG/DL
EOSINOPHIL # BLD AUTO: 0.29 K/UL
EOSINOPHIL NFR BLD AUTO: 3.5 %
ESTIMATED AVERAGE GLUCOSE: 137 MG/DL
GLUCOSE SERPL-MCNC: 112 MG/DL
HBA1C MFR BLD HPLC: 6.4 %
HCT VFR BLD CALC: 48.2 %
HDLC SERPL-MCNC: 44 MG/DL
HGB BLD-MCNC: 15.4 G/DL
IMM GRANULOCYTES NFR BLD AUTO: 0.4 %
LDLC SERPL CALC-MCNC: 72 MG/DL
LYMPHOCYTES # BLD AUTO: 2.21 K/UL
LYMPHOCYTES NFR BLD AUTO: 26.6 %
MAN DIFF?: NORMAL
MCHC RBC-ENTMCNC: 28.9 PG
MCHC RBC-ENTMCNC: 32 GM/DL
MCV RBC AUTO: 90.4 FL
MONOCYTES # BLD AUTO: 0.62 K/UL
MONOCYTES NFR BLD AUTO: 7.5 %
NEUTROPHILS # BLD AUTO: 5.1 K/UL
NEUTROPHILS NFR BLD AUTO: 61.3 %
NONHDLC SERPL-MCNC: 114 MG/DL
PLATELET # BLD AUTO: 278 K/UL
POTASSIUM SERPL-SCNC: 4.9 MMOL/L
PROT SERPL-MCNC: 7.2 G/DL
RBC # BLD: 5.33 M/UL
RBC # FLD: 14.2 %
SODIUM SERPL-SCNC: 141 MMOL/L
TRIGL SERPL-MCNC: 211 MG/DL
TSH SERPL-ACNC: 1 UIU/ML
WBC # FLD AUTO: 8.31 K/UL

## 2021-12-09 ENCOUNTER — NON-APPOINTMENT (OUTPATIENT)
Age: 86
End: 2021-12-09

## 2022-02-15 NOTE — H&P ADULT - PSH
Detail Level: Generalized Additional Notes: Soak 3 times a day., we will reevaluate in one week. Render Risk Assessment In Note?: no No significant past surgical history

## 2022-04-05 ENCOUNTER — RX RENEWAL (OUTPATIENT)
Age: 87
End: 2022-04-05

## 2022-05-20 ENCOUNTER — APPOINTMENT (OUTPATIENT)
Dept: INTERNAL MEDICINE | Facility: CLINIC | Age: 87
End: 2022-05-20
Payer: MEDICARE

## 2022-05-20 VITALS
BODY MASS INDEX: 29.25 KG/M2 | RESPIRATION RATE: 14 BRPM | HEART RATE: 73 BPM | TEMPERATURE: 98.1 F | HEIGHT: 56 IN | OXYGEN SATURATION: 97 % | WEIGHT: 130 LBS

## 2022-05-20 VITALS — SYSTOLIC BLOOD PRESSURE: 134 MMHG | DIASTOLIC BLOOD PRESSURE: 80 MMHG

## 2022-05-20 PROCEDURE — 99214 OFFICE O/P EST MOD 30 MIN: CPT | Mod: 25

## 2022-05-20 PROCEDURE — 36415 COLL VENOUS BLD VENIPUNCTURE: CPT

## 2022-05-20 NOTE — PHYSICAL EXAM
[No Acute Distress] : no acute distress [Well Nourished] : well nourished [Normal Sclera/Conjunctiva] : normal sclera/conjunctiva [PERRL] : pupils equal round and reactive to light [Normal Outer Ear/Nose] : the outer ears and nose were normal in appearance [No JVD] : no jugular venous distention [No Lymphadenopathy] : no lymphadenopathy [No Respiratory Distress] : no respiratory distress  [No Accessory Muscle Use] : no accessory muscle use [Clear to Auscultation] : lungs were clear to auscultation bilaterally [Normal Rate] : normal rate  [Regular Rhythm] : with a regular rhythm [No Carotid Bruits] : no carotid bruits [No Edema] : there was no peripheral edema [No Extremity Clubbing/Cyanosis] : no extremity clubbing/cyanosis [Soft] : abdomen soft [Non Tender] : non-tender [Non-distended] : non-distended [No Masses] : no abdominal mass palpated [Grossly Normal Strength/Tone] : grossly normal strength/tone [de-identified] : both hands arthritic changes

## 2022-05-20 NOTE — HISTORY OF PRESENT ILLNESS
[de-identified] : Pt comes for FBW and med renewal Has had vaccine x 3  Pt has stable ckd 3 a  Pt just saw Dr Beebe cardio  No complaints except usual arthritis

## 2022-05-20 NOTE — REVIEW OF SYSTEMS
[Joint Pain] : joint pain [Joint Stiffness] : joint stiffness [Fever] : no fever [Chills] : no chills [Discharge] : no discharge [Pain] : no pain [Chest Pain] : no chest pain [Palpitations] : no palpitations [Lower Ext Edema] : no lower extremity edema [Shortness Of Breath] : no shortness of breath [Wheezing] : no wheezing [Abdominal Pain] : no abdominal pain [Vomiting] : no vomiting [Dysuria] : no dysuria [Dizziness] : no dizziness [Unsteady Walking] : no ataxia [Depression] : no depression

## 2022-05-22 LAB
25(OH)D3 SERPL-MCNC: 32.8 NG/ML
ALBUMIN SERPL ELPH-MCNC: 4.1 G/DL
ALP BLD-CCNC: 131 U/L
ALT SERPL-CCNC: 16 U/L
ANION GAP SERPL CALC-SCNC: 16 MMOL/L
AST SERPL-CCNC: 25 U/L
BASOPHILS # BLD AUTO: 0.07 K/UL
BASOPHILS NFR BLD AUTO: 1 %
BILIRUB SERPL-MCNC: 0.6 MG/DL
BUN SERPL-MCNC: 29 MG/DL
CALCIUM SERPL-MCNC: 9.4 MG/DL
CHLORIDE SERPL-SCNC: 103 MMOL/L
CHOLEST SERPL-MCNC: 165 MG/DL
CO2 SERPL-SCNC: 21 MMOL/L
CREAT SERPL-MCNC: 1.25 MG/DL
EGFR: 41 ML/MIN/1.73M2
EOSINOPHIL # BLD AUTO: 0.45 K/UL
EOSINOPHIL NFR BLD AUTO: 6.2 %
ESTIMATED AVERAGE GLUCOSE: 140 MG/DL
GLUCOSE SERPL-MCNC: 140 MG/DL
HBA1C MFR BLD HPLC: 6.5 %
HCT VFR BLD CALC: 49.1 %
HDLC SERPL-MCNC: 55 MG/DL
HGB BLD-MCNC: 15.5 G/DL
IMM GRANULOCYTES NFR BLD AUTO: 0.4 %
LDLC SERPL CALC-MCNC: 89 MG/DL
LYMPHOCYTES # BLD AUTO: 1.78 K/UL
LYMPHOCYTES NFR BLD AUTO: 24.6 %
MAN DIFF?: NORMAL
MCHC RBC-ENTMCNC: 28.5 PG
MCHC RBC-ENTMCNC: 31.6 GM/DL
MCV RBC AUTO: 90.4 FL
MONOCYTES # BLD AUTO: 0.56 K/UL
MONOCYTES NFR BLD AUTO: 7.7 %
NEUTROPHILS # BLD AUTO: 4.35 K/UL
NEUTROPHILS NFR BLD AUTO: 60.1 %
NONHDLC SERPL-MCNC: 110 MG/DL
PLATELET # BLD AUTO: 290 K/UL
POTASSIUM SERPL-SCNC: 4.9 MMOL/L
PROT SERPL-MCNC: 6.9 G/DL
RBC # BLD: 5.43 M/UL
RBC # FLD: 14.4 %
SODIUM SERPL-SCNC: 140 MMOL/L
TRIGL SERPL-MCNC: 107 MG/DL
TSH SERPL-ACNC: 0.92 UIU/ML
WBC # FLD AUTO: 7.24 K/UL

## 2022-06-21 NOTE — H&P ADULT - PROBLEM SELECTOR PLAN 2
Patient walked in to [facility] clinic after being seen in [facility] clinic.  States that she has had some spotting on her underwear for about 1 week.  Just spotting noted.  Has a history of hemorrhoids.  Noted some blood on toilet paper after having a BM.      Also questioning if she has a bladder infection.  Is just going to the bathroom frequently.  Denies any burning or pain at this time.  Denies any fever.     States she will wait as long as she is able for a no show in clinic this afternoon.  Advised her that she can also go to Urgent Care for further evaluation.  Location given to her.  Made tentative appt for next week if she wanted to leave.        Did not wait for an availability today.  
received 2000 ml of NS bolus by ED team, Lactic acid level was normalized down to 1.4 mmol/dl, will maintain at 100 ml/h, monitor I & O, and repeat BUN/Cr in am..
21-Jun-2022 15:59

## 2022-08-21 ENCOUNTER — EMERGENCY (EMERGENCY)
Facility: HOSPITAL | Age: 87
LOS: 1 days | Discharge: HOME | End: 2022-08-21
Attending: EMERGENCY MEDICINE | Admitting: EMERGENCY MEDICINE
Payer: MEDICARE

## 2022-08-21 ENCOUNTER — TRANSCRIPTION ENCOUNTER (OUTPATIENT)
Age: 87
End: 2022-08-21

## 2022-08-21 ENCOUNTER — NON-APPOINTMENT (OUTPATIENT)
Age: 87
End: 2022-08-21

## 2022-08-21 VITALS
DIASTOLIC BLOOD PRESSURE: 77 MMHG | RESPIRATION RATE: 17 BRPM | OXYGEN SATURATION: 97 % | HEART RATE: 60 BPM | SYSTOLIC BLOOD PRESSURE: 170 MMHG | TEMPERATURE: 98 F

## 2022-08-21 VITALS
WEIGHT: 130.07 LBS | SYSTOLIC BLOOD PRESSURE: 178 MMHG | DIASTOLIC BLOOD PRESSURE: 82 MMHG | HEIGHT: 56 IN | RESPIRATION RATE: 15 BRPM | OXYGEN SATURATION: 97 % | TEMPERATURE: 98 F | HEART RATE: 85 BPM

## 2022-08-21 PROCEDURE — 99284 EMERGENCY DEPT VISIT MOD MDM: CPT

## 2022-08-21 PROCEDURE — 99284 EMERGENCY DEPT VISIT MOD MDM: CPT | Mod: FS

## 2022-08-21 RX ORDER — MULTIVIT-MIN/FERROUS GLUCONATE 9 MG/15 ML
2 LIQUID (ML) ORAL
Qty: 0 | Refills: 0 | DISCHARGE

## 2022-08-21 RX ORDER — CYCLOPENTOLATE HYDROCHLORIDE 10 MG/ML
1 SOLUTION/ DROPS OPHTHALMIC
Qty: 1 | Refills: 0
Start: 2022-08-21 | End: 2022-08-27

## 2022-08-21 RX ORDER — RABEPRAZOLE 20 MG/1
1 TABLET, DELAYED RELEASE ORAL
Qty: 0 | Refills: 0 | DISCHARGE

## 2022-08-21 RX ORDER — OFLOXACIN 0.3 %
1 DROPS OPHTHALMIC (EYE)
Qty: 0 | Refills: 0 | DISCHARGE

## 2022-08-21 RX ORDER — ERYTHROMYCIN BASE 5 MG/GRAM
1 OINTMENT (GRAM) OPHTHALMIC (EYE)
Qty: 1 | Refills: 0
Start: 2022-08-21 | End: 2022-08-27

## 2022-08-21 RX ORDER — MULTIVIT-MIN/FERROUS GLUCONATE 9 MG/15 ML
1 LIQUID (ML) ORAL
Qty: 0 | Refills: 0 | DISCHARGE

## 2022-08-21 RX ORDER — METOPROLOL TARTRATE 50 MG
1 TABLET ORAL
Qty: 0 | Refills: 0 | DISCHARGE

## 2022-08-21 RX ORDER — OLMESARTAN MEDOXOMIL 5 MG/1
2 TABLET, FILM COATED ORAL
Qty: 0 | Refills: 0 | DISCHARGE

## 2022-08-21 RX ORDER — ASPIRIN/CALCIUM CARB/MAGNESIUM 324 MG
1 TABLET ORAL
Qty: 0 | Refills: 0 | DISCHARGE

## 2022-08-21 RX ORDER — CLOPIDOGREL BISULFATE 75 MG/1
1 TABLET, FILM COATED ORAL
Qty: 0 | Refills: 0 | DISCHARGE

## 2022-08-21 RX ORDER — PREDNISOLONE SODIUM PHOSPHATE 1 %
1 DROPS OPHTHALMIC (EYE)
Qty: 0 | Refills: 0 | DISCHARGE

## 2022-08-21 RX ORDER — ACETAMINOPHEN 500 MG
2 TABLET ORAL
Qty: 0 | Refills: 0 | DISCHARGE

## 2022-08-21 RX ORDER — ACETAMINOPHEN 500 MG
650 TABLET ORAL ONCE
Refills: 0 | Status: COMPLETED | OUTPATIENT
Start: 2022-08-21 | End: 2022-08-21

## 2022-08-21 RX ORDER — IBUPROFEN 200 MG
400 TABLET ORAL ONCE
Refills: 0 | Status: COMPLETED | OUTPATIENT
Start: 2022-08-21 | End: 2022-08-21

## 2022-08-21 RX ADMIN — Medication 650 MILLIGRAM(S): at 14:25

## 2022-08-21 RX ADMIN — Medication 650 MILLIGRAM(S): at 13:29

## 2022-08-21 NOTE — ED PROVIDER NOTE - PATIENT PORTAL LINK FT
You can access the FollowMyHealth Patient Portal offered by [facility] by registering at the following website: [email or web address]. By joining [employer]’s FollowMyHealth portal, you will also be able to view your health information using other applications (apps) compatible with our system.

## 2022-08-21 NOTE — ED ADULT NURSE REASSESSMENT NOTE - NS ED NURSE REASSESS COMMENT FT1
Attempted visual acuity, the patient was unable to see the chart at all with either eye and also could not see the chart with both eyes.

## 2022-08-21 NOTE — CONSULT NOTE ADULT - SUBJECTIVE AND OBJECTIVE BOX
[facility] DEPARTMENT OF OPHTHALMOLOGY - INITIAL ADULT CONSULT  -----------------------------------------------------------------------------------------------------------------  Eleno Meeks MD PGY 2  Contact via [employer] Teams  -----------------------------------------------------------------------------------------------------------------    HPI: Per ED  89-year-old female with history of hypertension, GERD, cataract surgery of left eye, band keratopathy of both eyes, history of iritis, macular degeneration, blindness in left eye, aortic aneurysm, hep B presents with complaining of pain and redness in left eye x2 days.  Patient states that she has had inflammation in her left eye for the past 2 months. patient has been seeing ophthalmologist, Dr. Raul Partida, had bandaid lens from left eye removed due to irritation on 8/10. Pt is currently on cyclogyl, ofloxacin, prednisolone drops. Patient has follow-up on 8/24 and is scheduled to see an oculoplastic surgeon in 1 week.  Patient states that she started having pain left eye 2 days ago which has significantly worsened with associated redness. Pt has "hemorrhage in right eye" but denies any right eye complaints at this time. Denies trauma, fever, n/v, discharge, other symptoms.    Interval History: Today patient complaining of left eye pain that is above her baseline pain. States she came to the ED with her daughter because the pain was not controllable. Pain was abated by topical anesthetic. Patient states she has been having eye pain for "months" and the quality of the pain is unchanged. Is NLP at baseline in the affected eye. No pain with eye movements. Patient also states she recently had a "hemorrhage" in her right eye that has caused a decrease in vision. The hemorrhage is being treated by Dr. Mishra with injections. 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 Patient is on ofloxacin and prednisolone BID left eye. No discharge or double vision.     PAST MEDICAL & SURGICAL HISTORY:  HTN (hypertension)      Aortic aneurysm      Macular degeneration  right eye      Hepatitis B      [place]ally blind in [place], as defined in [place]  monthly lens insertion      No significant past surgical history        Past Ocular History: PCIOL OS, band keratopathy OS>OD. ARMD OU.     Family History:  FAMILY HISTORY:  Family history of diabetes mellitus (Mother)    Family history of diabetes mellitus (Sibling)  Brother &amp; Sister.    Family history of gestational diabetes (Sibling)  Niece.      Ophthalmic Medications: Ofloxacin, PF BID. AF and genteal.     MEDICATIONS  (STANDING):    MEDICATIONS  (PRN):    Allergies & Intolerances:     Review of Systems:  Constitutional: No fever, chills  Eyes: No blurry vision, flashes, floaters, FBS, discharge, double vision, OU  Neuro: No tremors  Cardiovascular: No chest pain, palpitations  Respiratory: No SOB, no cough  GI: No nausea, vomiting, abdominal pain  : No dysuria  Skin: no rash  Psych: no depression  Endocrine: no polyuria, polydipsia  Heme/lymph: no swelling    VITALS: T(C): 36.7 (08-21-22 @ 11:32)  T(F): 98.1 (08-21-22 @ 11:32), Max: 98.1 (08-21-22 @ 11:32)  HR: 85 (08-21-22 @ 11:32) (85 - 85)  BP: 178/82 (08-21-22 @ 11:32) (178/82 - 178/82)  RR:  (15 - 15)  SpO2:  (97% - 97%)  Wt(kg): --  General: AAO x 3, appropriate mood and affect    Ophthalmology Exam:  Visual acuity (sc): 20/800 OD. NLP  Pupils: PERRL OU, no APD  Tonometry:  9,10  Extraocular movements (EOMs): Full OU, no pain, no diplopia.  Confrontational Visual Field (CVF): Full OD. ENRIQUE OS.   Color Plates: ENRIQUE OU    Pen Light Exam (PLE)  External: Normal OU.  Lids/Lashes/Lacrimal Ducts: Flat OU.  Sclera/Conjunctiva: White and quiet OD. 1+ injection OS.   Cornea: Band keratopathy, haze OD, Band Keratopathy OS with central area of epithelial defect. No infiltrates.   Anterior Chamber: Deep and formed OU.  Iris: Flat OU.  Lens: Clear OU.    Fundus Exam: dilated with 1% tropicamide and 2.5% phenylephrine  Approval obtained from primary team for dilation  Patient aware that pupils can remained dilated for at least 4-6 hours.  Exam performed with 20 D lens    Vitreous: wnl OU  Disc, cup/disc: sharp and pink, 0.4 OU  Macula: wnl OU  Vessels: wnl OU  Periphery: w       [facility] DEPARTMENT OF OPHTHALMOLOGY - INITIAL ADULT CONSULT  -----------------------------------------------------------------------------------------------------------------  Eleno Meeks MD PGY 2  Contact via [employer] Teams  -----------------------------------------------------------------------------------------------------------------    HPI: Per ED  89-year-old female with history of hypertension, GERD, cataract surgery of left eye, band keratopathy of both eyes, history of iritis, macular degeneration, blindness in left eye, aortic aneurysm, hep B presents with complaining of pain and redness in left eye x2 days.  Patient states that she has had inflammation in her left eye for the past 2 months. patient has been seeing ophthalmologist, Dr. Raul Partida, had bandaid lens from left eye removed due to irritation on 8/10. Pt is currently on cyclogyl, ofloxacin, prednisolone drops. Patient has follow-up on 8/24 and is scheduled to see an oculoplastic surgeon in 1 week.  Patient states that she started having pain left eye 2 days ago which has significantly worsened with associated redness. Pt has "hemorrhage in right eye" but denies any right eye complaints at this time. Denies trauma, fever, n/v, discharge, other symptoms.    Interval History: Today patient complaining of left eye pain that is above her baseline pain. States she came to the ED with her daughter because the pain was not controllable. Pain was abated by topical anesthetic. Patient states she has been having eye pain for "months" and the quality of the pain is unchanged. Is NLP at baseline in the affected eye. No pain with eye movements. Patient also states she recently had a "hemorrhage" in her right eye that has caused a decrease in vision. The hemorrhage is being treated by Dr. Mishra with injections. 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 Patient is on ofloxacin and prednisolone BID left eye. No discharge or double vision.     PAST MEDICAL & SURGICAL HISTORY:  HTN (hypertension)      Aortic aneurysm      Macular degeneration  right eye      Hepatitis B      Leg[place] blind in [place], [place] defined in [place]  monthly lens insertion      No significant past surgical history        Past Ocular History: PCIOL OS, band keratopathy OS>OD. ARMD OU.     Family History:  FAMILY HISTORY:  Family history of diabetes mellitus (Mother)    Family history of diabetes mellitus (Sibling)  Brother &amp; Sister.    Family history of gestational diabetes (Sibling)  Niece.      Ophthalmic Medications: Ofloxacin, PF BID. AF and genteal.     MEDICATIONS  (STANDING):    MEDICATIONS  (PRN):    Allergies & Intolerances:     Review of Systems:  Constitutional: No fever, chills  Eyes: No blurry vision, flashes, floaters, FBS, discharge, double vision, OU  Neuro: No tremors  Cardiovascular: No chest pain, palpitations  Respiratory: No SOB, no cough  GI: No nausea, vomiting, abdominal pain  : No dysuria  Skin: no rash  Psych: no depression  Endocrine: no polyuria, polydipsia  Heme/lymph: no swelling    VITALS: T(C): 36.7 (08-21-22 @ 11:32)  T(F): 98.1 (08-21-22 @ 11:32), Max: 98.1 (08-21-22 @ 11:32)  HR: 85 (08-21-22 @ 11:32) (85 - 85)  BP: 178/82 (08-21-22 @ 11:32) (178/82 - 178/82)  RR:  (15 - 15)  SpO2:  (97% - 97%)  Wt(kg): --  General: AAO x 3, appropriate mood and affect    Ophthalmology Exam:  Visual acuity (sc): 20/800 OD. NLP  Pupils: PERRL OU, no APD  Tonometry:  9,10  Extraocular movements (EOMs): Full OU, no pain, no diplopia.  Confrontational Visual Field (CVF): Full OD. ENRIQUE OS.   Color Plates: ENRIQUE OU    Pen Light Exam (PLE)  External: Normal OU.  Lids/Lashes/Lacrimal Ducts: Flat OU.  Sclera/Conjunctiva: White and quiet OD. 1+ injection OS.   Cornea: Band keratopathy, haze OD, Band Keratopathy OS with central area of epithelial defect. No infiltrates.   Anterior Chamber: Deep and formed OU.  Iris: Flat OU.  Lens: Clear OU.    Fundus Exam: dilated with 1% tropicamide and 2.5% phenylephrine  Approval obtained from primary team for dilation  Patient aware that pupils can remained dilated for at least 4-6 hours.  Exam performed with 20 D lens    Vitreous: wnl OU  Disc, cup/disc: sharp and pink, 0.4 OU  Macula: wnl OU  Vessels: wnl OU  Periphery: w       [facility] DEPARTMENT OF OPHTHALMOLOGY - INITIAL ADULT CONSULT  -----------------------------------------------------------------------------------------------------------------  Eleno Meeks MD PGY 2  Contact via [employer] Teams  -----------------------------------------------------------------------------------------------------------------    HPI: Per ED  89-year-old female with history of hypertension, GERD, cataract surgery of left eye, band keratopathy of both eyes, history of iritis, macular degeneration, blindness in left eye, aortic aneurysm, hep B presents with complaining of pain and redness in left eye x2 days.  Patient states that she has had inflammation in her left eye for the past 2 months. patient has been seeing ophthalmologist, Dr. Raul Partida, had bandaid lens from left eye removed due to irritation on 8/10. Pt is currently on cyclogyl, ofloxacin, prednisolone drops. Patient has follow-up on 8/24 and is scheduled to see an oculoplastic surgeon in 1 week.  Patient states that she started having pain left eye 2 days ago which has significantly worsened with associated redness. Pt has "hemorrhage in right eye" but denies any right eye complaints at this time. Denies trauma, fever, n/v, discharge, other symptoms.    Interval History: Today patient complaining of left eye pain that is above her baseline pain. States she came to the ED with her daughter because the pain was not controllable. Pain was abated by topical anesthetic. Patient states she has been having eye pain for "months" and the quality of the pain is unchanged. Is NLP at baseline in the affected eye. No pain with eye movements. Patient also states she recently had a "hemorrhage" in her right eye that has caused a decrease in vision. The hemorrhage is being treated by Dr. Mishra with injections. 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 Patient is on ofloxacin and prednisolone BID left eye. No discharge or double vision.     PAST MEDICAL & SURGICAL HISTORY:  HTN (hypertension)      Aortic aneurysm      Macular degeneration  right eye      Hepatitis B      Legally blind in left [place], [place] defined in [place]  monthly lens insertion      No significant past surgical history        Past Ocular History: PCIOL OS, band keratopathy OS>OD. ARMD OU.     Family History:  FAMILY HISTORY:  Family history of diabetes mellitus (Mother)    Family history of diabetes mellitus (Sibling)  Brother &amp; Sister.    Family history of gestational diabetes (Sibling)  Niece.      Ophthalmic Medications: Ofloxacin, PF BID. AF and genteal.     MEDICATIONS  (STANDING):    MEDICATIONS  (PRN):    Allergies & Intolerances:     Review of Systems:  Constitutional: No fever, chills  Eyes: No blurry vision, flashes, floaters, FBS, discharge, double vision, OU  Neuro: No tremors  Cardiovascular: No chest pain, palpitations  Respiratory: No SOB, no cough  GI: No nausea, vomiting, abdominal pain  : No dysuria  Skin: no rash  Psych: no depression  Endocrine: no polyuria, polydipsia  Heme/lymph: no swelling    VITALS: T(C): 36.7 (08-21-22 @ 11:32)  T(F): 98.1 (08-21-22 @ 11:32), Max: 98.1 (08-21-22 @ 11:32)  HR: 85 (08-21-22 @ 11:32) (85 - 85)  BP: 178/82 (08-21-22 @ 11:32) (178/82 - 178/82)  RR:  (15 - 15)  SpO2:  (97% - 97%)  Wt(kg): --  General: AAO x 3, appropriate mood and affect    Ophthalmology Exam:  Visual acuity (sc): 20/800 OD. NLP  Pupils: PERRL OU, no APD  Tonometry:  9,10  Extraocular movements (EOMs): Full OU, no pain, no diplopia.  Confrontational Visual Field (CVF): Full OD. ENRIQUE OS.   Color Plates: ENRIQUE OU    Pen Light Exam (PLE)  External: Normal OU.  Lids/Lashes/Lacrimal Ducts: Flat OU.  Sclera/Conjunctiva: White and quiet OD. 1+ injection OS.   Cornea: Band keratopathy, haze OD, Band Keratopathy OS with central area of epithelial defect. No infiltrates.   Anterior Chamber: Deep and formed OU.  Iris: Flat OU.  Lens: Clear OU.    Fundus Exam: dilated with 1% tropicamide and 2.5% phenylephrine  Approval obtained from primary team for dilation  Patient aware that pupils can remained dilated for at least 4-6 hours.  Exam performed with 20 D lens    Vitreous: wnl OU  Disc, cup/disc: sharp and pink, 0.4 OU  Macula: wnl OU  Vessels: wnl OU  Periphery: w       [facility] DEPARTMENT OF OPHTHALMOLOGY - INITIAL ADULT CONSULT  -----------------------------------------------------------------------------------------------------------------  Eleno Meeks MD PGY 2  Contact via [employer] Teams  -----------------------------------------------------------------------------------------------------------------    HPI: Per ED  89-year-old female with history of hypertension, GERD, cataract surgery of left eye, band keratopathy of both eyes, history of iritis, macular degeneration, blindness in left eye, aortic aneurysm, hep B presents with complaining of pain and redness in left eye x2 days.  Patient states that she has had inflammation in her left eye for the past 2 months. patient has been seeing ophthalmologist, Dr. Raul Partida, had bandaid lens from left eye removed due to irritation on 8/10. Pt is currently on cyclogyl, ofloxacin, prednisolone drops. Patient has follow-up on 8/24 and is scheduled to see an oculoplastic surgeon in 1 week.  Patient states that she started having pain left eye 2 days ago which has significantly worsened with associated redness. Pt has "hemorrhage in right eye" but denies any right eye complaints at this time. Denies trauma, fever, n/v, discharge, other symptoms.    Interval History: Today patient complaining of left eye pain that is above her baseline pain. States she came to the ED with her daughter because the pain was not controllable. Pain was abated by topical anesthetic. Patient states she has been having eye pain for "months" and the quality of the pain is unchanged. Is NLP at baseline in the affected eye. No pain with eye movements. Patient also states she recently had a "hemorrhage" in her right eye that has caused a decrease in vision. The hemorrhage is being treated by Dr. Mishra with injections. 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 Patient is on ofloxacin and prednisolone BID left eye. No discharge or double vision.     PAST MEDICAL & SURGICAL HISTORY:  HTN (hypertension)      Aortic aneurysm      Macular degeneration  right eye      Hepatitis B      Legally blind in left [place], as defined in [place]  monthly lens insertion      No significant past surgical history        Past Ocular History: PCIOL OS, band keratopathy OS>OD. ARMD OU.     Family History:  FAMILY HISTORY:  Family history of diabetes mellitus (Mother)    Family history of diabetes mellitus (Sibling)  Brother &amp; Sister.    Family history of gestational diabetes (Sibling)  Niece.      Ophthalmic Medications: Ofloxacin, PF BID. AF and genteal.     MEDICATIONS  (STANDING):    MEDICATIONS  (PRN):    Allergies & Intolerances:     Review of Systems:  Constitutional: No fever, chills  Eyes: No blurry vision, flashes, floaters, FBS, discharge, double vision, OU  Neuro: No tremors  Cardiovascular: No chest pain, palpitations  Respiratory: No SOB, no cough  GI: No nausea, vomiting, abdominal pain  : No dysuria  Skin: no rash  Psych: no depression  Endocrine: no polyuria, polydipsia  Heme/lymph: no swelling    VITALS: T(C): 36.7 (08-21-22 @ 11:32)  T(F): 98.1 (08-21-22 @ 11:32), Max: 98.1 (08-21-22 @ 11:32)  HR: 85 (08-21-22 @ 11:32) (85 - 85)  BP: 178/82 (08-21-22 @ 11:32) (178/82 - 178/82)  RR:  (15 - 15)  SpO2:  (97% - 97%)  Wt(kg): --  General: AAO x 3, appropriate mood and affect    Ophthalmology Exam:  Visual acuity (sc): 20/800 OD. NLP  Pupils: PERRL OU, no APD  Tonometry:  9,10  Extraocular movements (EOMs): Full OU, no pain, no diplopia.  Confrontational Visual Field (CVF): Full OD. ENRIQUE OS.   Color Plates: ENRIQUE OU    Pen Light Exam (PLE)  External: Normal OU.  Lids/Lashes/Lacrimal Ducts: Flat OU.  Sclera/Conjunctiva: White and quiet OD. 1+ injection OS.   Cornea: Band keratopathy, haze OD, Band Keratopathy OS with central area of epithelial defect. No infiltrates.   Anterior Chamber: Deep and formed OU.  Iris: Flat OU.  Lens: Clear OU.    Fundus Exam: dilated with 1% tropicamide and 2.5% phenylephrine  Approval obtained from primary team for dilation  Patient aware that pupils can remained dilated for at least 4-6 hours.  Exam performed with 20 D lens    Vitreous: wnl OU  Disc, cup/disc: sharp and pink, 0.4 OU  Macula: geographic atrophy OU. Inferior subhyaloid hemorrhage OD.  Vessels: wnl OU  Periphery: wnl ou       [facility] DEPARTMENT OF OPHTHALMOLOGY - INITIAL ADULT CONSULT  -----------------------------------------------------------------------------------------------------------------  Eleno Meeks MD PGY 2  Contact via [employer] Teams  -----------------------------------------------------------------------------------------------------------------    HPI: Per ED  89-year-old female with history of hypertension, GERD, cataract surgery of left eye, band keratopathy of both eyes, history of iritis, macular degeneration, blindness in left eye, aortic aneurysm, hep B presents with complaining of pain and redness in left eye x2 days.  Patient states that she has had inflammation in her left eye for the past 2 months. patient has been seeing ophthalmologist, Dr. Raul Partida, had bandaid lens from left eye removed due to irritation on 8/10. Pt is currently on cyclogyl, ofloxacin, prednisolone drops. Patient has follow-up on 8/24 and is scheduled to see an oculoplastic surgeon in 1 week.  Patient states that she started having pain left eye 2 days ago which has significantly worsened with associated redness. Pt has "hemorrhage in right eye" but denies any right eye complaints at this time. Denies trauma, fever, n/v, discharge, other symptoms.    Interval History: Today patient complaining of left eye pain that is above her baseline pain. States she came to the ED with her daughter because the pain was not controllable. Pain was abated by topical anesthetic. Patient states she has been having eye pain for "months" and the quality of the pain is unchanged. Is NLP at baseline in the affected eye. No pain with eye movements. Patient also states she recently had a "hemorrhage" in her right eye that has caused a decrease in vision. The hemorrhage is being treated by Dr. Mishra with injections. 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 Patient is on ofloxacin and prednisolone BID left eye. No discharge or double vision.     PAST MEDICAL & SURGICAL HISTORY:  HTN (hypertension)      Aortic aneurysm      Macular degeneration  right eye      Hepatitis B      Legally blind in left [place], as defined in [place]  monthly lens insertion      No significant past surgical history        Past Ocular History: PCIOL OS, band keratopathy OS>OD. ARMD OU.     Family History:  FAMILY HISTORY:  Family history of diabetes mellitus (Mother)    Family history of diabetes mellitus (Sibling)  Brother &amp; Sister.    Family history of gestational diabetes (Sibling)  Niece.      Ophthalmic Medications: Ofloxacin, PF BID. AF and genteal.     MEDICATIONS  (STANDING):    MEDICATIONS  (PRN):    Allergies & Intolerances:     Review of Systems:  Constitutional: No fever, chills  Eyes: No blurry vision, flashes, floaters, FBS, discharge, double vision, OU  Neuro: No tremors  Cardiovascular: No chest pain, palpitations  Respiratory: No SOB, no cough  GI: No nausea, vomiting, abdominal pain  : No dysuria  Skin: no rash  Psych: no depression  Endocrine: no polyuria, polydipsia  Heme/lymph: no swelling    VITALS: T(C): 36.7 (08-21-22 @ 11:32)  T(F): 98.1 (08-21-22 @ 11:32), Max: 98.1 (08-21-22 @ 11:32)  HR: 85 (08-21-22 @ 11:32) (85 - 85)  BP: 178/82 (08-21-22 @ 11:32) (178/82 - 178/82)  RR:  (15 - 15)  SpO2:  (97% - 97%)  Wt(kg): --  General: AAO x 3, appropriate mood and affect    Ophthalmology Exam:  Visual acuity (sc): 20/800 OD. NLP  Pupils: PERRL OU, no APD  Tonometry:  9,10  Extraocular movements (EOMs): Full OU, no pain, no diplopia.  Confrontational Visual Field (CVF): Full OD. ENRIQUE OS.   Color Plates: ENRIQUE OU    Pen Light Exam (PLE)  External: Normal OU.  Lids/Lashes/Lacrimal Ducts: Flat OU.  Sclera/Conjunctiva: White and quiet OD. 1+ injection OS.   Cornea: Band keratopathy, haze OD, Band Keratopathy OS with central area of epithelial defect. No infiltrates.   Anterior Chamber: Deep and formed OU.  Iris: Flat OU.  Lens: Clear OU.    Fundus Exam: dilated with 1% tropicamide and 2.5% phenylephrine  Approval obtained from primary team for dilation  Patient aware that pupils can remained dilated for at least 4-6 hours.  Exam performed with 20 D lens    Vitreous: wnl OU  Disc, cup/disc: sharp and pink, 0.4 OU  Macula: geographic atrophy OU. Inferior subhyaloid hemorrhage OD.  Vessels: wnl OU  Periphery: wnl ou       [facility] DEPARTMENT OF OPHTHALMOLOGY - INITIAL ADULT CONSULT  -----------------------------------------------------------------------------------------------------------------  Eleno Meeks MD PGY 2  Contact via [employer] Teams  -----------------------------------------------------------------------------------------------------------------    HPI: Per ED  89-year-old female with history of hypertension, GERD, cataract surgery of left eye, band keratopathy of both eyes, history of iritis, macular degeneration, blindness in left eye, aortic aneurysm, hep B presents with complaining of pain and redness in left eye x2 days.  Patient states that she has had inflammation in her left eye for the past 2 months. patient has been seeing ophthalmologist, Dr. Raul Partida, had bandaid lens from left eye removed due to irritation on 8/10. Pt is currently on cyclogyl, ofloxacin, prednisolone drops. Patient has follow-up on 8/24 and is scheduled to see an oculoplastic surgeon in 1 week.  Patient states that she started having pain left eye 2 days ago which has significantly worsened with associated redness. Pt has "hemorrhage in right eye" but denies any right eye complaints at this time. Denies trauma, fever, n/v, discharge, other symptoms.    Interval History: Today patient complaining of left eye pain that is above her baseline pain. States she came to the ED with her daughter because the pain was not controllable. Pain was abated by topical anesthetic. Patient states she has been having eye pain for "months" and the quality of the pain is unchanged. Is NLP at baseline in the affected eye. No pain with eye movements. Patient also states she recently had a "hemorrhage" in her right eye that has caused a decrease in vision. The hemorrhage is being treated by Dr. Mishra with injections. 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 Patient is on ofloxacin and prednisolone BID left eye. No discharge or double vision.     PAST MEDICAL & SURGICAL HISTORY:  HTN (hypertension)      Aortic aneurysm      Macular degeneration  right eye      Hepatitis B      Legally blind in left eye, as defined in [place]  monthly lens insertion      No significant past surgical history        Past Ocular History: PCIOL OS, band keratopathy OS>OD. ARMD OU.     Family History:  FAMILY HISTORY:  Family history of diabetes mellitus (Mother)    Family history of diabetes mellitus (Sibling)  Brother &amp; Sister.    Family history of gestational diabetes (Sibling)  Niece.      Ophthalmic Medications: Ofloxacin, PF BID. AF and genteal.     MEDICATIONS  (STANDING):    MEDICATIONS  (PRN):    Allergies & Intolerances:     Review of Systems:  Constitutional: No fever, chills  Eyes: No blurry vision, flashes, floaters, FBS, discharge, double vision, OU  Neuro: No tremors  Cardiovascular: No chest pain, palpitations  Respiratory: No SOB, no cough  GI: No nausea, vomiting, abdominal pain  : No dysuria  Skin: no rash  Psych: no depression  Endocrine: no polyuria, polydipsia  Heme/lymph: no swelling    VITALS: T(C): 36.7 (08-21-22 @ 11:32)  T(F): 98.1 (08-21-22 @ 11:32), Max: 98.1 (08-21-22 @ 11:32)  HR: 85 (08-21-22 @ 11:32) (85 - 85)  BP: 178/82 (08-21-22 @ 11:32) (178/82 - 178/82)  RR:  (15 - 15)  SpO2:  (97% - 97%)  Wt(kg): --  General: AAO x 3, appropriate mood and affect    Ophthalmology Exam:  Visual acuity (sc): 20/800 OD. NLP  Pupils: PERRL OU, no APD  Tonometry:  9,10  Extraocular movements (EOMs): Full OU, no pain, no diplopia.  Confrontational Visual Field (CVF): Full OD. ENRIQUE OS.   Color Plates: ENRIQUE OU    Pen Light Exam (PLE)  External: Normal OU.  Lids/Lashes/Lacrimal Ducts: Flat OU.  Sclera/Conjunctiva: White and quiet OD. 1+ injection OS.   Cornea: Band keratopathy, haze OD, Band Keratopathy OS with central area of epithelial defect. No infiltrates.   Anterior Chamber: Deep and formed OU.  Iris: Flat OU.  Lens: Clear OU.    Fundus Exam: dilated with 1% tropicamide and 2.5% phenylephrine  Approval obtained from primary team for dilation  Patient aware that pupils can remained dilated for at least 4-6 hours.  Exam performed with 20 D lens    Vitreous: wnl OU  Disc, cup/disc: sharp and pink, 0.4 OU  Macula: geographic atrophy OU. Inferior subhyaloid hemorrhage OD.  Vessels: wnl OU  Periphery: wnl ou

## 2022-08-21 NOTE — ED PROVIDER NOTE - NS ED MD DISPO DISCHARGE
OFFICE VISIT    Markie Obrien  [id number]    Chief complaint:  History of prostate cancer, lower urinary tract symptoms    Markie Obrien is a 82 year old male returning for further evaluation and management of prostate cancer lower urinary tract symptoms.  Patient has history of prostate cancer treated with radiation 2013. Repeat PSA 0.14 today.  This is within normal range for him.    He also has significant lower urinary tract symptoms.  He was started on tamsulosin in fall 2019. This initially resulted in significant improvement in his symptomatology.  He did continue to have significant urgency and urge incontinence however.  Trospium extended release was added in February 2020. He reports significantly improved symptoms since adding the trospium.  Urge incontinence is significantly improved.  Only remaining lower urinary tract symptom is intermittent postvoid dribbling.    Postvoid residual minimal today.    Past medical history reviewed with patient and no significant changes since prior visit.        Summary of your Discharge Medications          Accurate as of September 2, 2020 11:33 AM. Always use your most recent med list.            Take these Medications      Details   albuterol 108 (90 Base) MCG/ACT inhaler   Inhale 2 puffs into the lungs every 4 hours as needed for Shortness of Breath or Wheezing.     budesonide-formoterol 80-4.5 MCG/ACT inhaler  Commonly known as: Symbicort   Inhale 2 puffs into the lungs 2 times daily. Rinse mouth after use     fluticasone 50 MCG/ACT nasal spray  Commonly known as: FLONASE   Spray 2 sprays in each nostril daily.     montelukast 10 MG tablet  Commonly known as: SINGULAIR   TAKE 1 TABLET BY MOUTH EVERY EVENING     NIFEdipine XL 30 MG 24 hr tablet  Commonly known as: PROCARDIA XL   Take 1 tablet by mouth daily.  Comment: Appointment needed for further refills.     PreserVision/Lutein Cap   Take 1 capsule by mouth 2 times daily.     PROBIOTIC PO      rivaroxaban 20 MG  Tab  Commonly known as: Xarelto   Take 1 tablet by mouth daily (with breakfast).     tamsulosin 0.4 MG Cap  Commonly known as: FLOMAX   TAKE 1 CAPSULE BY MOUTH DAILY AFTER A MEAL     trospium 60 MG extended-release capsule   Take 1 capsule by mouth once daily              ALLERGIES:  No Known Allergies    ROS:  Constitutional:  No fevers or chills  Genitourinary:  No hematuria or dysuria    EXAM:    Vital Signs:   Visit Vitals  BP (!) 143/68   Pulse 87   Temp 97.3 °F (36.3 °C)   Resp (!) 180   SpO2 96%       Constitutional: The patient is well developed, well nourished, in no acute distress, appears stated age.  Neck: Symmetric without swelling or tenderness.   Respiratory: Respiratory effort normal without accessory muscle use  Cardiovascular: Regular rate, no edema      Labs:    PSA, Total (ng/mL)   Date Value   07/25/2019 0.18   09/23/2018 0.17   09/18/2017 0.17     Prostate Specific Antigen (ng/mL)   Date Value   07/29/2020 0.14       Assessment and Plan:  81 yo male with history of prostate cancer and lower urinary tract symptoms.  Prostate cancer was treated with radiation.  PSA remains within normal range.  His lower urinary tract symptoms are considerably improved on combination therapy with tamsulosin and trospium.  Medications will be continued.  He denies significant side effects.  He will follow up in 1 year.  Repeat PSA in 1 year.    Benito Fallon MD  [facility]   Home

## 2022-08-21 NOTE — ED PROVIDER NOTE - NS ED ATTENDING STATEMENT MOD
This was a shared visit with the CARRILLO. I reviewed and verified the documentation and independently performed the documented:

## 2022-08-21 NOTE — ED PROVIDER NOTE - NSFOLLOWUPINSTRUCTIONS_ED_ALL_ED_FT
Follow up with your ophthalmologist tomorrow for re-evaluation, ongoing care and treatment. Instill cyclogyl eye drops as directed. If having worsening of symptoms or other related symptoms, RETURN TO THE ER IMMEDIATELY. Follow up with your ophthalmologist tomorrow for re-evaluation, ongoing care and treatment. Instill cyclogyl eye drops as directed. Use erythromycin ointment in addition to ofloxacin drops as directed. Use the drops first and then use the ointment. Use lubricating ointment in eye every one hour. If having worsening of symptoms or other related symptoms, RETURN TO THE ER IMMEDIATELY.

## 2022-08-21 NOTE — ED PROVIDER NOTE - PROGRESS NOTE DETAILS
Pt's daughter wanted Dr. Cody paged to notify him that pt is in the ED.  Dr. Healy called called and will notify Glo. Spoke with ophthalmology resident, Dr. Meeks who requested to send pictures of eye and will discuss plan. spoke to ophtho resident, will evaluate pt in ED for consult. Pt seen by ophthalmology resident In ED, advised to use cyclogyl drops, cleared for dc and has f/u with her ophthalmologist tomorrow. Pt seen by ophthalmology resident In ED, advised to use cyclogyl drops 1 drop in left eye BID, can add erythromycin oint BID to her already using ofloxacin drops, use lubricating oint Q one hour in left eye, cleared for dc and has f/u with her ophthalmologist tomorrow.

## 2022-08-21 NOTE — ED ADULT NURSE NOTE - OBJECTIVE STATEMENT
Patient presents with c/o increasing pain to her left eye. patient is legally blind in that eye and states she is managed by Dr. Park opthalmology and gets lens changes monthly to that eye. She developed inflammation of the eye about 2 months ago after an attempted lens change and has been on prednisolone drops and Oflaxacin. The eye was improving and recently the steroid drops were decreased to 2X daily. In the past 2 days she has had increased pain to the eye with tearing and is unable to open the eye. She reached out to Dr. Park's service with no return call so came here. Patient also has macular degeneration and recent retinal hemorrhage a few months ago of the right eye so is unable to perform visual acuity with either eye.

## 2022-08-21 NOTE — ED PROVIDER NOTE - CLINICAL SUMMARY MEDICAL DECISION MAKING FREE TEXT BOX
DT: I have personally performed a face to face diagnostic evaluation on this patient.  I have reviewed the PA's note and agree with the history, exam, and plan of care, except as noted.  History and Exam by me shows 89-year-old female with history of hypertension, GERD, cataract surgery of left eye, band keratopathy of both eyes, history of iritis, macular degeneration, blindness in left eye, aortic aneurysm, hep B presents with complaining of pain and redness in left eye x2 days.  Patient states that she has had inflammation in her left eye for the past 2 months. patient has been seeing ophthalmologist, Dr. Raul Partida, had bandaid lens from left eye removed due to irritation on 8/10. Pt is currently on ofloxacin, prednisolone drops. Patient has follow-up on 8/24 and is scheduled to see an oculoplastic surgeon in 1 week.  Patient states that she started having pain left eye 2 days ago which has significantly worsened with associated redness. Pt has "hemorrhage in right eye" but denies any right eye complaints at this time. Denies trauma, fever, n/v, discharge, other symptoms .  Patient is NAD.  A n O x 3. Head NC/AT. Left eye- diffuse conjunctival erythema c minimal dc.  pt cleared by ophthalmology resident for dc home.

## 2022-08-21 NOTE — ED ADULT NURSE NOTE - NSFALLRSKHRMRISKTYPE_ED_ALL_ED
Problem: At Risk for Falls  Goal: # Patient does not fall  Outcome: Outcome Met, Continue evaluating goal progress toward completion  Goal: # Takes action to control fall-related risks  Outcome: Outcome Met, Continue evaluating goal progress toward completion  Goal: # Verbalizes understanding of fall risk/precautions  Description: Document education using the patient education activity  Outcome: Outcome Met, Continue evaluating goal progress toward completion     Problem: At Risk for Injury Due to Fall  Goal: # Patient does not fall  Outcome: Outcome Met, Continue evaluating goal progress toward completion  Goal: # Takes action to control condition specific risks  Outcome: Outcome Met, Continue evaluating goal progress toward completion  Goal: # Verbalizes understanding of fall-related injury personal risks  Description: Document education using the patient education activity  Outcome: Outcome Met, Continue evaluating goal progress toward completion     Problem: VTE, Risk for  Goal: # No s/s of VTE  Outcome: Outcome Met, Continue evaluating goal progress toward completion  Goal: # Verbalizes understanding of VTE risk factors and prevention  Description: Document education using the patient education activity.   Outcome: Outcome Met, Continue evaluating goal progress toward completion     Problem: Pain  Goal: #Acceptable pain level achieved/maintained at rest using NRS/Faces  Description: This goal is used for patients who can self-report.  Acceptable means the level is at or below the identified comfort/function goal.  Outcome: Outcome Met, Continue evaluating goal progress toward completion  Goal: # Acceptable pain level achieved/maintained with activity using NRS/Faces  Description: This goal is used for patients who can self-report and are not achieving acceptable pain control during activity.  Outcome: Outcome Met, Continue evaluating goal progress toward completion  Goal: # Verbalizes understanding of pain  management  Description: Documented in Patient Education Activity  Outcome: Outcome Met, Continue evaluating goal progress toward completion      age(85 years old or older)

## 2022-08-21 NOTE — ED ADULT NURSE NOTE - EYE APPEARANCE
legally, tearing and pain/blind minimal vision due to macular degeneration and recent retinal hemorrhage

## 2022-08-21 NOTE — ED PROVIDER NOTE - ENMT, MLM
Airway patent. Mouth with normal mucosa. no periorbital or preseptal erythema/tenderness/swelling noted B

## 2022-08-21 NOTE — ED ADULT NURSE NOTE - NSIMPLEMENTINTERV_GEN_ALL_ED
Implemented All Fall with Harm Risk Interventions:  [place] to call system. Call bell, personal items and telephone within reach. Instruct patient to call for assistance. Room bathroom lighting operational. Non-slip footwear when patient is off stretcher. Physically safe environment: no spills, clutter or unnecessary equipment. Stretcher in lowest position, wheels locked, appropriate side rails in place. Provide visual cue, wrist band, yellow gown, etc. Monitor gait and stability. Monitor for mental status changes and reorient to person, place, and time. Review medications for side effects contributing to fall risk. Reinforce activity limits and safety measures with patient and family. Provide visual clues: red socks.

## 2022-08-21 NOTE — ED ADULT NURSE NOTE - NSICDXPASTMEDICALHX_GEN_ALL_CORE_FT
PAST MEDICAL HISTORY:  Aortic aneurysm     Hepatitis B     HTN (hypertension)     Legally blind in left eye, as defined in [place] monthly lens insertion    Macular degeneration right eye

## 2022-08-21 NOTE — ED ADULT NURSE NOTE - NSICDXFAMILYHX_GEN_ALL_CORE_FT
FAMILY HISTORY:  Mother  Still living? No  Family history of diabetes mellitus, Age at diagnosis: Age Unknown    Sibling  Still living? Unknown  Family history of diabetes mellitus, Age at diagnosis: Age Unknown  Family history of gestational diabetes, Age at diagnosis: Age Unknown

## 2022-08-21 NOTE — CONSULT NOTE ADULT - ASSESSMENT
Assessment and Recommendations:  89y female with a past medical history/ocular history of 89-year-old female with history of hypertension, GERD, cataract surgery of left eye, band keratopathy of both eyes, history of iritis, macular degeneration, blindness in left eye, aortic aneurysm, hep B, consulted for evaluation of left eye pain.     #Left eye pain  -Patient has chronic history of left eye pain secondary to band keratopathy and associated inflammatory reaction due to intolerance to bandage contact lens. Per her ophthalmologist, the plan is for her left eye to be evaluated by oculoplastics for enucleation given the affect in quality of life secondary to pain.   -Patient's exam is at baseline from her last exam on 08/10/22.   -No corneal infiltrates or perforation noted on exam.   -Continue prednisolone BID, ofloxacin BID left eye. Add cyclopentolate BID for comfort from light sensitivity. ADD erythromycin ointment BID.   -Patient to follow up with [facility] tomorrow    #Retinal Hemorrhage OD  Patient vision is 20/800 OD, slightly improved from prior exam where she was CF.   Patient follows with Dr. Mishra for retinal injections for hemorrhage monthly.  Patient should follow-up with her retina doctor as scheduled.     WESTLEY Ayon MD PGY-4. WESTLEY HERNANDEZ On call doctor.     Outpatient Follow-up: Patient should follow-up with his/her ophthalmologist or with [facility] Department of Ophthalmology within 1 week of after discharge at:    [address]  [place] [address]  [phone]    Eleno Meeks MD PGY 1  Available on [employer] Teams Assessment and Recommendations:  89y female with a past medical history/ocular history of 89-year-old female with history of hypertension, GERD, cataract surgery of left eye, band keratopathy of both eyes, history of iritis, macular degeneration, blindness in left eye, aortic aneurysm, hep B, consulted for evaluation of left eye pain.     #Left eye pain  -Patient has chronic history of left eye pain secondary to band keratopathy and associated inflammatory reaction due to intolerance to bandage contact lens. Per her ophthalmologist, the plan is for her left eye to be evaluated by oculoplastics for enucleation given the affect in quality of life secondary to pain.   -Patient's exam is at baseline from her last exam on 08/10/22.   -No corneal infiltrates or perforation noted on exam.   -Continue prednisolone BID, ofloxacin BID left eye. Add cyclopentolate BID for comfort from light sensitivity. ADD erythromycin ointment BID.   -Patient to follow up with [facility] tomorrow    #Retinal Hemorrhage OD  Patient vision is 20/800 OD, slightly improved from prior exam where she was CF.   Patient follows with Dr. Mishra for retinal injections for hemorrhage monthly.  Patient should follow-up with her retina doctor as scheduled.     WESTLEY Ayon MD PGY-4. WESTLEY HERNANDEZ On call doctor.     Outpatient Follow-up: Patient should follow-up with his/her ophthalmologist or with [facility] Department of Ophthalmology within 1 week of after discharge at:    [address]  [place] [address]  [phone]    Eleno Meeks MD PGY 1  Available on [employer] Teams Assessment and Recommendations:  89y female with a past medical history/ocular history of 89-year-old female with history of hypertension, GERD, cataract surgery of left eye, band keratopathy of both eyes, history of iritis, macular degeneration, blindness in left eye, aortic aneurysm, hep B, consulted for evaluation of left eye pain.     #Left eye pain  -Patient has chronic history of left eye pain secondary to band keratopathy and associated inflammatory reaction due to intolerance to bandage contact lens. Per her ophthalmologist, the plan is for her left eye to be evaluated by oculoplastics for enucleation given the affect in quality of life secondary to pain.   -Patient's exam is at baseline from her last exam on 08/10/22.   -No corneal infiltrates or perforation noted on exam.   -Continue prednisolone BID, ofloxacin BID left eye. Add cyclopentolate BID for comfort from light sensitivity. ADD erythromycin ointment BID.   -Patient to follow up with [facility] tomorrow    #Retinal Hemorrhage OD  Patient vision is 20/800 OD, slightly improved from prior exam where she was CF.   Patient follows with Dr. Mishra for retinal injections for hemorrhage monthly.  Patient should follow-up with her retina doctor as scheduled.     WESTLEY Ayon MD PGY-4. WESTLEY HERNANDEZ On call doctor.     Outpatient Follow-up: Patient should follow-up with his/her ophthalmologist or with [facility] Department of Ophthalmology within 1 week of after discharge at:    [address]  [place] [address]  [phone]    Eleno Meeks MD PGY 1  Available on [employer] Teams Assessment and Recommendations:  89y female with a past medical history/ocular history of 89-year-old female with history of hypertension, GERD, cataract surgery of left eye, band keratopathy of both eyes, history of iritis, macular degeneration, blindness in left eye, aortic aneurysm, hep B, consulted for evaluation of left eye pain.     #Left eye pain  -Patient has chronic history of left eye pain secondary to band keratopathy and associated inflammatory reaction due to intolerance to bandage contact lens. Per her ophthalmologist, the plan is for her left eye to be evaluated by oculoplastics for enucleation given the affect in quality of life secondary to pain. Patient has appointment with Dr. Power, oculoplastics next week.   -Patient's exam is at baseline from her last exam on 08/10/22.   -No corneal infiltrates or perforation noted on exam. Has central area with epithelial defect and thinning.   -Continue ofloxacin BID OS, ADD erythromycin ointment. Instruct to always use drops prior to ointment.  - Continue prednisolone BID OS, patient is light sensitivity, can add cyclopentolate BID OS.   - Spoke with on-call at [facility], patient can be seen tomorrow in their clinic. Instructed patient to call in AM.     #Retinal Hemorrhage OD  Patient vision is 20/800 OD, slightly improved from prior exam where she was CF.   Patient follows with Dr. Mishra for retinal injections for hemorrhage monthly.  Patient should follow-up with her retina doctor as scheduled.     WESTLEY Ayon MD PGY-4. WESTLEY [facility] On call doctor.     Outpatient Follow-up: Patient should follow-up with his/her ophthalmologist or with [facility] Department of Ophthalmology within 1 week of after discharge at:    [address]  [place], [place] [id number] [phone]    Eleno Meeks MD PGY 1  Available on [employer] Teams Assessment and Recommendations:  89y female with a past medical history/ocular history of 89-year-old female with history of hypertension, GERD, cataract surgery of left eye, band keratopathy of both eyes, history of iritis, macular degeneration, blindness in left eye, aortic aneurysm, hep B, consulted for evaluation of left eye pain.     #Left eye pain  -Patient has chronic history of left eye pain secondary to band keratopathy and associated inflammatory reaction due to intolerance to bandage contact lens. Per her ophthalmologist, the plan is for her left eye to be evaluated by oculoplastics for enucleation given the affect in quality of life secondary to pain. Patient has appointment with Dr. Power, oculoplastics next week.   -Patient's exam is at baseline from her last exam on 08/10/22.   -No corneal infiltrates or perforation noted on exam. Has central area with epithelial defect and thinning.   -Continue ofloxacin BID OS, ADD erythromycin ointment. Instruct to always use drops prior to ointment.  - Continue prednisolone BID OS, patient is light sensitivity, can add cyclopentolate BID OS.   - Spoke with on-call at [facility], patient can be seen tomorrow in their clinic. Instructed patient to call in AM.     #Retinal Hemorrhage OD  Patient vision is 20/800 OD, slightly improved from prior exam where she was CF.   Patient follows with Dr. Mishra for retinal injections for hemorrhage monthly.  Patient should follow-up with her retina doctor as scheduled.     WESTLEY Ayon MD PGY-4. WESTLEY [facility] On call doctor.     Outpatient Follow-up: Patient should follow-up with his/her ophthalmologist or with [facility] Department of Ophthalmology within 1 week of after discharge at:    [address]  [place], [place] [id number] [phone]    Eleno Meeks MD PGY 1  Available on [employer] Teams Assessment and Recommendations:  89y female with a past medical history/ocular history of 89-year-old female with history of hypertension, GERD, cataract surgery of left eye, band keratopathy of both eyes, history of iritis, macular degeneration, blindness in left eye, aortic aneurysm, hep B, consulted for evaluation of left eye pain.     #Left eye pain  -Patient has chronic history of left eye pain secondary to band keratopathy and associated inflammatory reaction due to intolerance to bandage contact lens. Per her ophthalmologist, the plan is for her left eye to be evaluated by oculoplastics for enucleation given the affect in quality of life secondary to pain. Patient has appointment with Dr. Power, oculoplastics next week.   -Patient's exam is at baseline from her last exam on 08/10/22.   -No corneal infiltrates or perforation noted on exam. Has central area with epithelial defect and thinning.   -Continue ofloxacin BID OS, ADD erythromycin ointment. Instruct to always use drops prior to ointment.  - Continue prednisolone BID OS, patient is light sensitivity, can add cyclopentolate BID OS.   - Spoke with on-call at [facility], patient can be seen tomorrow in their clinic. Instructed patient to call in AM.     #Retinal Hemorrhage OD  Patient vision is 20/800 OD, slightly improved from prior exam where she was CF.   Patient follows with Dr. Mishra for retinal injections for hemorrhage monthly.  Patient should follow-up with her retina doctor as scheduled.     WESTLEY Ayon MD PGY-4. WESTLEY [facility] On call doctor.     Outpatient Follow-up: Patient should follow-up with his/her ophthalmologist or with [facility] Department of Ophthalmology within 1 week of after discharge at:    [address]  [place], [place] [id number] [phone]    Eleno Meeks MD PGY 1  Available on [employer] Teams

## 2022-08-21 NOTE — ED PROVIDER NOTE - OBJECTIVE STATEMENT
89-year-old female with history of hypertension, GERD, cataract surgery of left eye, band keratopathy of both eyes, history of iritis, macular degeneration, blindness in left eye, aortic aneurysm, hep B presents with complaining of pain and redness in left eye x2 days.  Patient states that she has had inflammation in her left eye for the past 2 months. patient has been seeing ophthalmologist, Dr. Raul Partida, had bandaid lens from left eye removed due to irritation on 8/10. Pt is currently on cyclogyl, ofloxacin, prednisolone drops. Patient has follow-up on 8/24 and is scheduled to see an oculoplastic surgeon in 1 week.  Patient states that she started having pain left eye 2 days ago which has significantly worsened with associated redness. Pt has "hemorrhage in right eye" but denies any right eye complaints at this time. Denies trauma, fever, n/v, discharge, other symptoms. 89-year-old female with history of hypertension, GERD, cataract surgery of left eye, band keratopathy of both eyes, history of iritis, macular degeneration, blindness in left eye, aortic aneurysm, hep B presents with complaining of pain and redness in left eye x2 days.  Patient states that she has had inflammation in her left eye for the past 2 months. patient has been seeing ophthalmologist, Dr. Raul Partida, had bandaid lens from left eye removed due to irritation on 8/10. Pt is currently on ofloxacin, prednisolone drops. Patient has follow-up on 8/24 and is scheduled to see an oculoplastic surgeon in 1 week.  Patient states that she started having pain left eye 2 days ago which has significantly worsened with associated redness. Pt has "hemorrhage in right eye" but denies any right eye complaints at this time. Denies trauma, fever, n/v, discharge, other symptoms.

## 2022-08-29 ENCOUNTER — APPOINTMENT (OUTPATIENT)
Dept: INTERNAL MEDICINE | Facility: CLINIC | Age: 87
End: 2022-08-29

## 2022-08-29 VITALS
OXYGEN SATURATION: 94 % | WEIGHT: 127 LBS | TEMPERATURE: 98.4 F | RESPIRATION RATE: 14 BRPM | BODY MASS INDEX: 28.47 KG/M2 | HEART RATE: 82 BPM

## 2022-08-29 VITALS — SYSTOLIC BLOOD PRESSURE: 164 MMHG | DIASTOLIC BLOOD PRESSURE: 82 MMHG

## 2022-08-29 PROBLEM — H35.30 UNSPECIFIED MACULAR DEGENERATION: Chronic | Status: ACTIVE | Noted: 2017-06-03

## 2022-08-29 PROBLEM — H54.8 LEGAL BLINDNESS, AS DEFINED IN USA: Chronic | Status: ACTIVE | Noted: 2022-08-21

## 2022-08-29 PROCEDURE — 99214 OFFICE O/P EST MOD 30 MIN: CPT

## 2022-08-29 NOTE — REVIEW OF SYSTEMS
[Discharge] : discharge [Pain] : pain [Vision Problems] : vision problems [Chest Pain] : no chest pain [Palpitations] : no palpitations [Lower Ext Edema] : no lower extremity edema [Shortness Of Breath] : no shortness of breath [Wheezing] : no wheezing [Cough] : no cough [Abdominal Pain] : no abdominal pain

## 2022-08-29 NOTE — PHYSICAL EXAM
[No Acute Distress] : no acute distress [Normal Outer Ear/Nose] : the outer ears and nose were normal in appearance [No JVD] : no jugular venous distention [No Respiratory Distress] : no respiratory distress  [No Accessory Muscle Use] : no accessory muscle use [Clear to Auscultation] : lungs were clear to auscultation bilaterally [Normal Rate] : normal rate  [Regular Rhythm] : with a regular rhythm [No Edema] : there was no peripheral edema [No Extremity Clubbing/Cyanosis] : no extremity clubbing/cyanosis [Soft] : abdomen soft [Non Tender] : non-tender [de-identified] : left eye red and inflammed

## 2022-08-29 NOTE — HISTORY OF PRESENT ILLNESS
[FreeTextEntry8] : Pt comes for high bp Was in Er last week for acute left eye pain  Needs to have surgery to remove the eye  BP running in 180s  NEeds surgery 9/13 by Dr Power

## 2022-08-29 NOTE — PLAN
[FreeTextEntry1] : increase olmesartan to 20 mg   4 tabs of 5 mg  recheck 9/7 for preop clearance\par \par discussed with relative in detail  they will bring in preop forms

## 2022-09-07 ENCOUNTER — NON-APPOINTMENT (OUTPATIENT)
Age: 87
End: 2022-09-07

## 2022-09-07 ENCOUNTER — APPOINTMENT (OUTPATIENT)
Dept: INTERNAL MEDICINE | Facility: CLINIC | Age: 87
End: 2022-09-07

## 2022-09-07 VITALS — DIASTOLIC BLOOD PRESSURE: 80 MMHG | SYSTOLIC BLOOD PRESSURE: 134 MMHG

## 2022-09-07 VITALS
WEIGHT: 127 LBS | TEMPERATURE: 96.6 F | OXYGEN SATURATION: 96 % | HEART RATE: 86 BPM | HEIGHT: 56 IN | BODY MASS INDEX: 28.57 KG/M2 | RESPIRATION RATE: 14 BRPM

## 2022-09-07 DIAGNOSIS — Z01.818 ENCOUNTER FOR OTHER PREPROCEDURAL EXAMINATION: ICD-10-CM

## 2022-09-07 PROCEDURE — 36415 COLL VENOUS BLD VENIPUNCTURE: CPT

## 2022-09-07 PROCEDURE — 99214 OFFICE O/P EST MOD 30 MIN: CPT | Mod: 25

## 2022-09-07 PROCEDURE — 93000 ELECTROCARDIOGRAM COMPLETE: CPT

## 2022-09-07 NOTE — REVIEW OF SYSTEMS
[Vision Problems] : vision problems [Fever] : no fever [Chills] : no chills [Chest Pain] : no chest pain [Palpitations] : no palpitations [Shortness Of Breath] : no shortness of breath [Wheezing] : no wheezing [Cough] : no cough [Abdominal Pain] : no abdominal pain

## 2022-09-07 NOTE — PHYSICAL EXAM
[No Acute Distress] : no acute distress [Normal Outer Ear/Nose] : the outer ears and nose were normal in appearance [No JVD] : no jugular venous distention [No Respiratory Distress] : no respiratory distress  [No Accessory Muscle Use] : no accessory muscle use [Clear to Auscultation] : lungs were clear to auscultation bilaterally [Normal Rate] : normal rate  [Regular Rhythm] : with a regular rhythm [No Edema] : there was no peripheral edema [No Extremity Clubbing/Cyanosis] : no extremity clubbing/cyanosis [Soft] : abdomen soft [Non Tender] : non-tender [Non-distended] : non-distended [Normal Affect] : the affect was normal [Normal Insight/Judgement] : insight and judgment were intact [de-identified] : left eye pale

## 2022-09-09 LAB
ALBUMIN SERPL ELPH-MCNC: 4.2 G/DL
ALP BLD-CCNC: 149 U/L
ALT SERPL-CCNC: 21 U/L
ANION GAP SERPL CALC-SCNC: 14 MMOL/L
AST SERPL-CCNC: 27 U/L
BASOPHILS # BLD AUTO: 0.08 K/UL
BASOPHILS NFR BLD AUTO: 0.8 %
BILIRUB SERPL-MCNC: 0.6 MG/DL
BUN SERPL-MCNC: 34 MG/DL
CALCIUM SERPL-MCNC: 9.7 MG/DL
CHLORIDE SERPL-SCNC: 102 MMOL/L
CHOLEST SERPL-MCNC: 180 MG/DL
CO2 SERPL-SCNC: 23 MMOL/L
CREAT SERPL-MCNC: 1.44 MG/DL
EGFR: 35 ML/MIN/1.73M2
EOSINOPHIL # BLD AUTO: 0.31 K/UL
EOSINOPHIL NFR BLD AUTO: 3.2 %
ESTIMATED AVERAGE GLUCOSE: 140 MG/DL
GLUCOSE SERPL-MCNC: 111 MG/DL
HBA1C MFR BLD HPLC: 6.5 %
HCT VFR BLD CALC: 51.1 %
HDLC SERPL-MCNC: 48 MG/DL
HGB BLD-MCNC: 16.2 G/DL
IMM GRANULOCYTES NFR BLD AUTO: 0.3 %
LDLC SERPL CALC-MCNC: 85 MG/DL
LYMPHOCYTES # BLD AUTO: 2.25 K/UL
LYMPHOCYTES NFR BLD AUTO: 23.5 %
MAN DIFF?: NORMAL
MCHC RBC-ENTMCNC: 29.2 PG
MCHC RBC-ENTMCNC: 31.7 GM/DL
MCV RBC AUTO: 92.1 FL
MONOCYTES # BLD AUTO: 0.62 K/UL
MONOCYTES NFR BLD AUTO: 6.5 %
NEUTROPHILS # BLD AUTO: 6.27 K/UL
NEUTROPHILS NFR BLD AUTO: 65.7 %
NONHDLC SERPL-MCNC: 133 MG/DL
PLATELET # BLD AUTO: 263 K/UL
POTASSIUM SERPL-SCNC: 4.7 MMOL/L
PROT SERPL-MCNC: 7 G/DL
RBC # BLD: 5.55 M/UL
RBC # FLD: 14.6 %
SODIUM SERPL-SCNC: 140 MMOL/L
TRIGL SERPL-MCNC: 237 MG/DL
TSH SERPL-ACNC: 0.71 UIU/ML
WBC # FLD AUTO: 9.56 K/UL

## 2022-09-13 ENCOUNTER — RESULT REVIEW (OUTPATIENT)
Age: 87
End: 2022-09-13

## 2022-09-16 ENCOUNTER — APPOINTMENT (OUTPATIENT)
Dept: INTERNAL MEDICINE | Facility: CLINIC | Age: 87
End: 2022-09-16

## 2022-09-16 VITALS
HEIGHT: 56 IN | HEART RATE: 61 BPM | BODY MASS INDEX: 28.57 KG/M2 | WEIGHT: 127 LBS | OXYGEN SATURATION: 98 % | TEMPERATURE: 98 F

## 2022-09-16 VITALS — DIASTOLIC BLOOD PRESSURE: 78 MMHG | SYSTOLIC BLOOD PRESSURE: 120 MMHG

## 2022-09-16 DIAGNOSIS — H57.10 OCULAR PAIN, UNSPECIFIED EYE: ICD-10-CM

## 2022-09-16 DIAGNOSIS — R30.0 DYSURIA: ICD-10-CM

## 2022-09-16 LAB
BILIRUB UR QL STRIP: NEGATIVE
CLARITY UR: NORMAL
COLLECTION METHOD: NORMAL
GLUCOSE UR-MCNC: NEGATIVE
HCG UR QL: 0.2 EU/DL
HGB UR QL STRIP.AUTO: NORMAL
KETONES UR-MCNC: NEGATIVE
LEUKOCYTE ESTERASE UR QL STRIP: NEGATIVE
NITRITE UR QL STRIP: NEGATIVE
PH UR STRIP: 5.5
PROT UR STRIP-MCNC: 300
SP GR UR STRIP: 1.02

## 2022-09-16 PROCEDURE — 99214 OFFICE O/P EST MOD 30 MIN: CPT | Mod: 25

## 2022-09-16 PROCEDURE — 81003 URINALYSIS AUTO W/O SCOPE: CPT | Mod: QW

## 2022-09-16 NOTE — PLAN
[FreeTextEntry1] : extensive questions answered  No evid UTI   miralax prn constipation  pt off pain meds now

## 2022-09-16 NOTE — PHYSICAL EXAM
[No Acute Distress] : no acute distress [Normal Outer Ear/Nose] : the outer ears and nose were normal in appearance [No JVD] : no jugular venous distention [No Respiratory Distress] : no respiratory distress  [No Accessory Muscle Use] : no accessory muscle use [Normal Rate] : normal rate  [Regular Rhythm] : with a regular rhythm [No Edema] : there was no peripheral edema [Soft] : abdomen soft [Non Tender] : non-tender [Non-distended] : non-distended [de-identified] : left eye patch

## 2022-09-16 NOTE — HISTORY OF PRESENT ILLNESS
[de-identified] : Pt is s/p removal of left eye  Was in ER for pain  NOt in pain now but no BM after pain meds  Also had dyruia but now just frequency

## 2022-09-16 NOTE — REVIEW OF SYSTEMS
[Chills] : chills [Frequency] : frequency [Fever] : no fever [Chest Pain] : no chest pain [Palpitations] : no palpitations [Lower Ext Edema] : no lower extremity edema [Shortness Of Breath] : no shortness of breath [Wheezing] : no wheezing [Abdominal Pain] : no abdominal pain [Dysuria] : no dysuria

## 2022-09-28 ENCOUNTER — APPOINTMENT (OUTPATIENT)
Dept: INTERNAL MEDICINE | Facility: CLINIC | Age: 87
End: 2022-09-28

## 2022-09-28 VITALS
HEIGHT: 56 IN | TEMPERATURE: 97.8 F | RESPIRATION RATE: 14 BRPM | WEIGHT: 123 LBS | OXYGEN SATURATION: 98 % | HEART RATE: 73 BPM | BODY MASS INDEX: 27.67 KG/M2

## 2022-09-28 PROCEDURE — 99213 OFFICE O/P EST LOW 20 MIN: CPT

## 2022-09-28 NOTE — HISTORY OF PRESENT ILLNESS
[de-identified] : Pt comes for left orbit shouting electric like pain occuring on and off  I discussed with Dr Power this morning about neuropathic quality of pain and i suggested gabapentin and possibly duloxetine for any associated depression  Pt and daughter do not think she is depressed  Pt states te pain is fleeting and severe  Pt has appt tomorrow with Dr Christine neurosurgeon

## 2022-09-28 NOTE — PHYSICAL EXAM
[No Acute Distress] : no acute distress [Normal Outer Ear/Nose] : the outer ears and nose were normal in appearance [No JVD] : no jugular venous distention [No Respiratory Distress] : no respiratory distress  [Normal Rate] : normal rate  [de-identified] : left eye evisceration

## 2022-10-07 ENCOUNTER — RX RENEWAL (OUTPATIENT)
Age: 87
End: 2022-10-07

## 2022-10-12 ENCOUNTER — APPOINTMENT (OUTPATIENT)
Dept: INTERNAL MEDICINE | Facility: CLINIC | Age: 87
End: 2022-10-12

## 2022-10-12 VITALS
TEMPERATURE: 97.7 F | HEIGHT: 56 IN | OXYGEN SATURATION: 98 % | WEIGHT: 121 LBS | RESPIRATION RATE: 14 BRPM | BODY MASS INDEX: 27.22 KG/M2 | HEART RATE: 68 BPM

## 2022-10-12 VITALS — DIASTOLIC BLOOD PRESSURE: 78 MMHG | SYSTOLIC BLOOD PRESSURE: 120 MMHG

## 2022-10-12 DIAGNOSIS — M79.2 NEURALGIA AND NEURITIS, UNSPECIFIED: ICD-10-CM

## 2022-10-12 PROCEDURE — 90662 IIV NO PRSV INCREASED AG IM: CPT

## 2022-10-12 PROCEDURE — G0008: CPT

## 2022-10-12 PROCEDURE — 99213 OFFICE O/P EST LOW 20 MIN: CPT | Mod: 25

## 2022-10-12 RX ORDER — DULOXETINE HYDROCHLORIDE 20 MG/1
20 CAPSULE, DELAYED RELEASE PELLETS ORAL
Qty: 30 | Refills: 0 | Status: DISCONTINUED | COMMUNITY
Start: 2022-09-28 | End: 2022-10-12

## 2022-10-12 RX ORDER — GABAPENTIN 100 MG/1
100 CAPSULE ORAL
Qty: 90 | Refills: 3 | Status: DISCONTINUED | COMMUNITY
Start: 2022-09-28 | End: 2022-10-12

## 2022-10-12 NOTE — HISTORY OF PRESENT ILLNESS
[de-identified] : Pt is currently pain free in left orbit  Pt took gabapentin for 2 days and then stopped   Needs to do more walking

## 2022-10-12 NOTE — PHYSICAL EXAM
[No Acute Distress] : no acute distress [No JVD] : no jugular venous distention [No Respiratory Distress] : no respiratory distress  [No Accessory Muscle Use] : no accessory muscle use [Normal Rate] : normal rate  [Regular Rhythm] : with a regular rhythm [No Edema] : there was no peripheral edema [No Extremity Clubbing/Cyanosis] : no extremity clubbing/cyanosis [Soft] : abdomen soft [Non Tender] : non-tender [de-identified] : left ey socket absent globe  no rednes or swelling

## 2022-12-07 ENCOUNTER — APPOINTMENT (OUTPATIENT)
Dept: INTERNAL MEDICINE | Facility: CLINIC | Age: 87
End: 2022-12-07

## 2022-12-07 VITALS
RESPIRATION RATE: 14 BRPM | WEIGHT: 127.06 LBS | HEART RATE: 67 BPM | HEIGHT: 56 IN | BODY MASS INDEX: 28.58 KG/M2 | OXYGEN SATURATION: 98 % | TEMPERATURE: 98 F

## 2022-12-07 VITALS — DIASTOLIC BLOOD PRESSURE: 78 MMHG | SYSTOLIC BLOOD PRESSURE: 128 MMHG

## 2022-12-07 PROCEDURE — 99214 OFFICE O/P EST MOD 30 MIN: CPT | Mod: 25

## 2022-12-07 PROCEDURE — 36415 COLL VENOUS BLD VENIPUNCTURE: CPT

## 2022-12-07 NOTE — PHYSICAL EXAM
[No Acute Distress] : no acute distress [Normal Outer Ear/Nose] : the outer ears and nose were normal in appearance [No JVD] : no jugular venous distention [No Respiratory Distress] : no respiratory distress  [No Accessory Muscle Use] : no accessory muscle use [Clear to Auscultation] : lungs were clear to auscultation bilaterally [Normal Rate] : normal rate  [Regular Rhythm] : with a regular rhythm [No Edema] : there was no peripheral edema [No Extremity Clubbing/Cyanosis] : no extremity clubbing/cyanosis [Soft] : abdomen soft [Non Tender] : non-tender [Non-distended] : non-distended [No Masses] : no abdominal mass palpated [Normal Gait] : normal gait [Memory Grossly Normal] : memory grossly normal [Normal Affect] : the affect was normal [Normal Mood] : the mood was normal [Normal Insight/Judgement] : insight and judgment were intact

## 2022-12-07 NOTE — HISTORY OF PRESENT ILLNESS
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

## 2022-12-08 LAB
ALBUMIN SERPL ELPH-MCNC: 4.1 G/DL
ALP BLD-CCNC: 194 U/L
ALT SERPL-CCNC: 22 U/L
ANION GAP SERPL CALC-SCNC: 11 MMOL/L
AST SERPL-CCNC: 27 U/L
BASOPHILS # BLD AUTO: 0.06 K/UL
BASOPHILS NFR BLD AUTO: 0.8 %
BILIRUB SERPL-MCNC: 0.4 MG/DL
BUN SERPL-MCNC: 33 MG/DL
CALCIUM SERPL-MCNC: 9.5 MG/DL
CHLORIDE SERPL-SCNC: 109 MMOL/L
CHOLEST SERPL-MCNC: 160 MG/DL
CO2 SERPL-SCNC: 21 MMOL/L
CREAT SERPL-MCNC: 1.25 MG/DL
EGFR: 41 ML/MIN/1.73M2
EOSINOPHIL # BLD AUTO: 0.3 K/UL
EOSINOPHIL NFR BLD AUTO: 4 %
ESTIMATED AVERAGE GLUCOSE: 134 MG/DL
GLUCOSE SERPL-MCNC: 136 MG/DL
HBA1C MFR BLD HPLC: 6.3 %
HCT VFR BLD CALC: 46.8 %
HDLC SERPL-MCNC: 46 MG/DL
HGB BLD-MCNC: 14.3 G/DL
IMM GRANULOCYTES NFR BLD AUTO: 0.5 %
LDLC SERPL CALC-MCNC: 58 MG/DL
LYMPHOCYTES # BLD AUTO: 2.09 K/UL
LYMPHOCYTES NFR BLD AUTO: 27.9 %
MAN DIFF?: NORMAL
MCHC RBC-ENTMCNC: 28.9 PG
MCHC RBC-ENTMCNC: 30.6 GM/DL
MCV RBC AUTO: 94.7 FL
MONOCYTES # BLD AUTO: 0.47 K/UL
MONOCYTES NFR BLD AUTO: 6.3 %
NEUTROPHILS # BLD AUTO: 4.54 K/UL
NEUTROPHILS NFR BLD AUTO: 60.5 %
NONHDLC SERPL-MCNC: 114 MG/DL
PLATELET # BLD AUTO: 251 K/UL
POTASSIUM SERPL-SCNC: 4.7 MMOL/L
PROT SERPL-MCNC: 6.8 G/DL
RBC # BLD: 4.94 M/UL
RBC # FLD: 14.2 %
SODIUM SERPL-SCNC: 141 MMOL/L
TRIGL SERPL-MCNC: 278 MG/DL
TSH SERPL-ACNC: 1.03 UIU/ML
WBC # FLD AUTO: 7.5 K/UL

## 2023-01-09 ENCOUNTER — APPOINTMENT (OUTPATIENT)
Dept: INTERNAL MEDICINE | Facility: CLINIC | Age: 88
End: 2023-01-09
Payer: MEDICARE

## 2023-01-09 DIAGNOSIS — U07.1 COVID-19: ICD-10-CM

## 2023-01-09 PROCEDURE — 99213 OFFICE O/P EST LOW 20 MIN: CPT | Mod: CS,95

## 2023-01-09 NOTE — HISTORY OF PRESENT ILLNESS
[de-identified] : Pt and son in law asked for teb as pt tested postive for covid this morning and has fever and cough and sore thraot  No chest pain or sob  Pt has CKD 3b stable [Home] : at home, [unfilled] , at the time of the visit. [Medical Office: ([facility])___] : at the medical office located in  [Family Member] : family member [Verbal consent obtained from patient] : the patient, [unfilled]

## 2023-01-09 NOTE — REVIEW OF SYSTEMS
[Fever] : fever [Chills] : chills [Sore Throat] : sore throat [Chest Pain] : no chest pain [Palpitations] : no palpitations [Lower Ext Edema] : no lower extremity edema [Shortness Of Breath] : no shortness of breath [Wheezing] : no wheezing [Cough] : cough [Abdominal Pain] : no abdominal pain

## 2023-03-29 ENCOUNTER — APPOINTMENT (OUTPATIENT)
Dept: INTERNAL MEDICINE | Facility: CLINIC | Age: 88
End: 2023-03-29

## 2023-03-31 ENCOUNTER — APPOINTMENT (OUTPATIENT)
Dept: INTERNAL MEDICINE | Facility: CLINIC | Age: 88
End: 2023-03-31
Payer: MEDICARE

## 2023-03-31 VITALS
OXYGEN SATURATION: 98 % | WEIGHT: 124 LBS | BODY MASS INDEX: 27.9 KG/M2 | TEMPERATURE: 97.8 F | HEIGHT: 56 IN | HEART RATE: 68 BPM | RESPIRATION RATE: 16 BRPM

## 2023-03-31 VITALS — DIASTOLIC BLOOD PRESSURE: 82 MMHG | SYSTOLIC BLOOD PRESSURE: 140 MMHG

## 2023-03-31 DIAGNOSIS — R79.89 OTHER SPECIFIED ABNORMAL FINDINGS OF BLOOD CHEMISTRY: ICD-10-CM

## 2023-03-31 PROCEDURE — G0009: CPT

## 2023-03-31 PROCEDURE — 36415 COLL VENOUS BLD VENIPUNCTURE: CPT

## 2023-03-31 PROCEDURE — 90677 PCV20 VACCINE IM: CPT

## 2023-03-31 PROCEDURE — 99214 OFFICE O/P EST MOD 30 MIN: CPT | Mod: 25

## 2023-03-31 NOTE — REVIEW OF SYSTEMS
[Joint Pain] : joint pain [Joint Stiffness] : joint stiffness [Fever] : no fever [Chills] : no chills [Chest Pain] : no chest pain [Palpitations] : no palpitations [Lower Ext Edema] : no lower extremity edema [Shortness Of Breath] : no shortness of breath [Wheezing] : no wheezing [Cough] : no cough [Abdominal Pain] : no abdominal pain [Vomiting] : no vomiting

## 2023-03-31 NOTE — PHYSICAL EXAM
[No Acute Distress] : no acute distress [Normal Sclera/Conjunctiva] : normal sclera/conjunctiva [Normal Outer Ear/Nose] : the outer ears and nose were normal in appearance [No JVD] : no jugular venous distention [No Lymphadenopathy] : no lymphadenopathy [Supple] : supple [No Respiratory Distress] : no respiratory distress  [No Accessory Muscle Use] : no accessory muscle use [Normal Rate] : normal rate  [Regular Rhythm] : with a regular rhythm [No Carotid Bruits] : no carotid bruits [No Edema] : there was no peripheral edema [No Extremity Clubbing/Cyanosis] : no extremity clubbing/cyanosis [Soft] : abdomen soft [Non Tender] : non-tender [Non-distended] : non-distended [No Masses] : no abdominal mass palpated [Normal Bowel Sounds] : normal bowel sounds [No Focal Deficits] : no focal deficits [Speech Grossly Normal] : speech grossly normal [Memory Grossly Normal] : memory grossly normal [Normal Mood] : the mood was normal

## 2023-03-31 NOTE — HISTORY OF PRESENT ILLNESS
[de-identified] : Pt comes for FBW and med renewal  Still having issues with her good eye.  Pt is anxoius about it  Pt has stable CKD 3 b

## 2023-04-03 LAB
25(OH)D3 SERPL-MCNC: 35 NG/ML
ALBUMIN SERPL ELPH-MCNC: 4.1 G/DL
ALP BLD-CCNC: 228 U/L
ALT SERPL-CCNC: 31 U/L
ANION GAP SERPL CALC-SCNC: 11 MMOL/L
AST SERPL-CCNC: 32 U/L
BASOPHILS # BLD AUTO: 0.07 K/UL
BASOPHILS NFR BLD AUTO: 0.9 %
BILIRUB SERPL-MCNC: 0.4 MG/DL
BUN SERPL-MCNC: 39 MG/DL
CALCIUM SERPL-MCNC: 9.7 MG/DL
CHLORIDE SERPL-SCNC: 110 MMOL/L
CHOLEST SERPL-MCNC: 172 MG/DL
CO2 SERPL-SCNC: 21 MMOL/L
CREAT SERPL-MCNC: 1.4 MG/DL
EGFR: 36 ML/MIN/1.73M2
EOSINOPHIL # BLD AUTO: 0.52 K/UL
EOSINOPHIL NFR BLD AUTO: 6.7 %
ESTIMATED AVERAGE GLUCOSE: 131 MG/DL
GLUCOSE SERPL-MCNC: 138 MG/DL
HBA1C MFR BLD HPLC: 6.2 %
HCT VFR BLD CALC: 47.2 %
HDLC SERPL-MCNC: 54 MG/DL
HGB BLD-MCNC: 15.1 G/DL
IMM GRANULOCYTES NFR BLD AUTO: 0.4 %
LDLC SERPL CALC-MCNC: 79 MG/DL
LYMPHOCYTES # BLD AUTO: 2.28 K/UL
LYMPHOCYTES NFR BLD AUTO: 29.6 %
MAN DIFF?: NORMAL
MCHC RBC-ENTMCNC: 29.3 PG
MCHC RBC-ENTMCNC: 32 GM/DL
MCV RBC AUTO: 91.5 FL
MONOCYTES # BLD AUTO: 0.54 K/UL
MONOCYTES NFR BLD AUTO: 7 %
NEUTROPHILS # BLD AUTO: 4.27 K/UL
NEUTROPHILS NFR BLD AUTO: 55.4 %
NONHDLC SERPL-MCNC: 118 MG/DL
PLATELET # BLD AUTO: 262 K/UL
POTASSIUM SERPL-SCNC: 4.9 MMOL/L
PROT SERPL-MCNC: 7 G/DL
RBC # BLD: 5.16 M/UL
RBC # FLD: 14.6 %
SODIUM SERPL-SCNC: 142 MMOL/L
TRIGL SERPL-MCNC: 195 MG/DL
TSH SERPL-ACNC: 1.14 UIU/ML
WBC # FLD AUTO: 7.71 K/UL

## 2023-04-24 ENCOUNTER — RX RENEWAL (OUTPATIENT)
Age: 88
End: 2023-04-24

## 2023-07-05 ENCOUNTER — RX RENEWAL (OUTPATIENT)
Age: 88
End: 2023-07-05

## 2023-07-07 ENCOUNTER — APPOINTMENT (OUTPATIENT)
Dept: INTERNAL MEDICINE | Facility: CLINIC | Age: 88
End: 2023-07-07
Payer: MEDICARE

## 2023-07-07 VITALS — HEART RATE: 78 BPM | HEIGHT: 56 IN | OXYGEN SATURATION: 96 % | BODY MASS INDEX: 27.9 KG/M2 | WEIGHT: 124 LBS

## 2023-07-07 VITALS — DIASTOLIC BLOOD PRESSURE: 82 MMHG | SYSTOLIC BLOOD PRESSURE: 138 MMHG

## 2023-07-07 PROCEDURE — 99214 OFFICE O/P EST MOD 30 MIN: CPT | Mod: 25

## 2023-07-07 PROCEDURE — 36415 COLL VENOUS BLD VENIPUNCTURE: CPT

## 2023-07-07 NOTE — HEALTH RISK ASSESSMENT
[Yes] : Yes [Monthly or less (1 pt)] : Monthly or less (1 point) [1 or 2 (0 pts)] : 1 or 2 (0 points) [Never (0 pts)] : Never (0 points) [No] : In the past 12 months have you used drugs other than those required for medical reasons? No [No falls in past year] : Patient reported no falls in the past year [0] : 2) Feeling down, depressed, or hopeless: Not at all (0) [PHQ-2 Negative - No further assessment needed] : PHQ-2 Negative - No further assessment needed [Audit-CScore] : 1 [[id number]] : 0

## 2023-07-07 NOTE — PHYSICAL EXAM
[No Acute Distress] : no acute distress [Normal Outer Ear/Nose] : the outer ears and nose were normal in appearance [No JVD] : no jugular venous distention [No Respiratory Distress] : no respiratory distress  [No Accessory Muscle Use] : no accessory muscle use [Clear to Auscultation] : lungs were clear to auscultation bilaterally [Normal Rate] : normal rate  [Regular Rhythm] : with a regular rhythm [No Edema] : there was no peripheral edema [No Extremity Clubbing/Cyanosis] : no extremity clubbing/cyanosis [Soft] : abdomen soft [Non Tender] : non-tender [Non-distended] : non-distended [Normal Bowel Sounds] : normal bowel sounds [Grossly Normal Strength/Tone] : grossly normal strength/tone [Memory Grossly Normal] : memory grossly normal [Normal Affect] : the affect was normal [Normal Insight/Judgement] : insight and judgment were intact [de-identified] : prosthetic eye

## 2023-07-07 NOTE — HISTORY OF PRESENT ILLNESS
[de-identified] : Pt comes for FBW and med renewal   Aware of kidney issues and she is trying to drink more water

## 2023-07-07 NOTE — PROGRESS NOTE ADULT - PROVIDER SPECIALTY LIST ADULT
Critical Care MEDICATIONS  (STANDING):  atorvastatin 20 milliGRAM(s) Oral at bedtime  chlorhexidine 2% Cloths 1 Application(s) Topical <User Schedule>  dextrose 5%. 1000 milliLiter(s) (50 mL/Hr) IV Continuous <Continuous>  dextrose 5%. 1000 milliLiter(s) (100 mL/Hr) IV Continuous <Continuous>  dextrose 50% Injectable 25 Gram(s) IV Push once  dextrose 50% Injectable 12.5 Gram(s) IV Push once  dextrose 50% Injectable 25 Gram(s) IV Push once  enoxaparin Injectable 40 milliGRAM(s) SubCutaneous every 24 hours  glucagon  Injectable 1 milliGRAM(s) IntraMuscular once  insulin lispro (ADMELOG) corrective regimen sliding scale   SubCutaneous three times a day before meals  insulin lispro (ADMELOG) corrective regimen sliding scale   SubCutaneous at bedtime  senna 2 Tablet(s) Oral at bedtime  sodium chloride 0.9%. 1000 milliLiter(s) (100 mL/Hr) IV Continuous <Continuous>    MEDICATIONS  (PRN):  acetaminophen     Tablet .. 650 milliGRAM(s) Oral every 6 hours PRN Temp greater or equal to 38C (100.4F), Mild Pain (1 - 3), Moderate Pain (4 - 6)  aluminum hydroxide/magnesium hydroxide/simethicone Suspension 30 milliLiter(s) Oral every 4 hours PRN Dyspepsia  dextrose Oral Gel 15 Gram(s) Oral once PRN Blood Glucose LESS THAN 70 milliGRAM(s)/deciliter  melatonin 3 milliGRAM(s) Oral at bedtime PRN Insomnia  ondansetron Injectable 4 milliGRAM(s) IV Push every 8 hours PRN Nausea and/or Vomiting

## 2023-07-07 NOTE — REVIEW OF SYSTEMS
[Frequency] : frequency [Joint Stiffness] : joint stiffness [Fever] : no fever [Chills] : no chills [Chest Pain] : no chest pain [Palpitations] : no palpitations [Lower Ext Edema] : no lower extremity edema [Shortness Of Breath] : no shortness of breath [Wheezing] : no wheezing [Cough] : no cough [Abdominal Pain] : no abdominal pain

## 2023-07-09 LAB
ALBUMIN SERPL ELPH-MCNC: 4.3 G/DL
ALP BLD-CCNC: 244 U/L
ALT SERPL-CCNC: 25 U/L
ANION GAP SERPL CALC-SCNC: 13 MMOL/L
AST SERPL-CCNC: 28 U/L
BILIRUB SERPL-MCNC: 0.5 MG/DL
BUN SERPL-MCNC: 32 MG/DL
CALCIUM SERPL-MCNC: 9.3 MG/DL
CHLORIDE SERPL-SCNC: 105 MMOL/L
CHOLEST SERPL-MCNC: 183 MG/DL
CO2 SERPL-SCNC: 22 MMOL/L
CREAT SERPL-MCNC: 1.48 MG/DL
EGFR: 33 ML/MIN/1.73M2
ESTIMATED AVERAGE GLUCOSE: 137 MG/DL
GLUCOSE SERPL-MCNC: 93 MG/DL
HBA1C MFR BLD HPLC: 6.4 %
HDLC SERPL-MCNC: 59 MG/DL
LDLC SERPL CALC-MCNC: 90 MG/DL
NONHDLC SERPL-MCNC: 124 MG/DL
POTASSIUM SERPL-SCNC: 5.4 MMOL/L
PROT SERPL-MCNC: 7.1 G/DL
SODIUM SERPL-SCNC: 140 MMOL/L
TRIGL SERPL-MCNC: 172 MG/DL
TSH SERPL-ACNC: 1.08 UIU/ML

## 2023-07-19 ENCOUNTER — OUTPATIENT (OUTPATIENT)
Dept: OUTPATIENT SERVICES | Facility: HOSPITAL | Age: 88
LOS: 1 days | End: 2023-07-19
Payer: MEDICARE

## 2023-07-19 ENCOUNTER — APPOINTMENT (OUTPATIENT)
Dept: ULTRASOUND IMAGING | Facility: CLINIC | Age: 88
End: 2023-07-19
Payer: MEDICARE

## 2023-07-19 DIAGNOSIS — N18.32 CHRONIC KIDNEY DISEASE, STAGE 3B: ICD-10-CM

## 2023-07-19 PROCEDURE — 76770 US EXAM ABDO BACK WALL COMP: CPT | Mod: 26

## 2023-07-19 PROCEDURE — 76770 US EXAM ABDO BACK WALL COMP: CPT

## 2023-10-09 ENCOUNTER — APPOINTMENT (OUTPATIENT)
Dept: INTERNAL MEDICINE | Facility: CLINIC | Age: 88
End: 2023-10-09
Payer: MEDICARE

## 2023-10-09 VITALS — DIASTOLIC BLOOD PRESSURE: 80 MMHG | SYSTOLIC BLOOD PRESSURE: 134 MMHG

## 2023-10-09 VITALS — OXYGEN SATURATION: 98 % | WEIGHT: 124 LBS | BODY MASS INDEX: 27.9 KG/M2 | HEIGHT: 56 IN | HEART RATE: 68 BPM

## 2023-10-09 PROCEDURE — 99214 OFFICE O/P EST MOD 30 MIN: CPT | Mod: 25

## 2023-10-09 PROCEDURE — 36415 COLL VENOUS BLD VENIPUNCTURE: CPT

## 2023-10-09 PROCEDURE — 90662 IIV NO PRSV INCREASED AG IM: CPT

## 2023-10-09 PROCEDURE — G0008: CPT

## 2023-10-11 LAB
ALBUMIN SERPL ELPH-MCNC: 4 G/DL
ALP BLD-CCNC: 271 U/L
ALT SERPL-CCNC: 25 U/L
ANION GAP SERPL CALC-SCNC: 12 MMOL/L
AST SERPL-CCNC: 29 U/L
BASOPHILS # BLD AUTO: 0.09 K/UL
BASOPHILS NFR BLD AUTO: 1.2 %
BILIRUB SERPL-MCNC: 0.6 MG/DL
BUN SERPL-MCNC: 28 MG/DL
CALCIUM SERPL-MCNC: 9.6 MG/DL
CHLORIDE SERPL-SCNC: 107 MMOL/L
CHOLEST SERPL-MCNC: 193 MG/DL
CO2 SERPL-SCNC: 23 MMOL/L
CREAT SERPL-MCNC: 1.62 MG/DL
EGFR: 30 ML/MIN/1.73M2
EOSINOPHIL # BLD AUTO: 0.73 K/UL
EOSINOPHIL NFR BLD AUTO: 9.4 %
ESTIMATED AVERAGE GLUCOSE: 134 MG/DL
GLUCOSE SERPL-MCNC: 141 MG/DL
HBA1C MFR BLD HPLC: 6.3 %
HCT VFR BLD CALC: 50.1 %
HDLC SERPL-MCNC: 65 MG/DL
HGB BLD-MCNC: 15.8 G/DL
IMM GRANULOCYTES NFR BLD AUTO: 0.3 %
LDLC SERPL CALC-MCNC: 102 MG/DL
LYMPHOCYTES # BLD AUTO: 2.29 K/UL
LYMPHOCYTES NFR BLD AUTO: 29.4 %
MAN DIFF?: NORMAL
MCHC RBC-ENTMCNC: 29 PG
MCHC RBC-ENTMCNC: 31.5 GM/DL
MCV RBC AUTO: 91.9 FL
MONOCYTES # BLD AUTO: 0.59 K/UL
MONOCYTES NFR BLD AUTO: 7.6 %
NEUTROPHILS # BLD AUTO: 4.07 K/UL
NEUTROPHILS NFR BLD AUTO: 52.1 %
NONHDLC SERPL-MCNC: 127 MG/DL
PLATELET # BLD AUTO: 231 K/UL
POTASSIUM SERPL-SCNC: 5 MMOL/L
PROT SERPL-MCNC: 7.2 G/DL
RBC # BLD: 5.45 M/UL
RBC # FLD: 14.4 %
SODIUM SERPL-SCNC: 142 MMOL/L
TRIGL SERPL-MCNC: 150 MG/DL
TSH SERPL-ACNC: 1.59 UIU/ML
WBC # FLD AUTO: 7.79 K/UL

## 2023-12-24 ENCOUNTER — TRANSCRIPTION ENCOUNTER (OUTPATIENT)
Age: 88
End: 2023-12-24

## 2024-01-05 ENCOUNTER — APPOINTMENT (OUTPATIENT)
Dept: INTERNAL MEDICINE | Facility: CLINIC | Age: 89
End: 2024-01-05
Payer: MEDICARE

## 2024-01-05 VITALS — BODY MASS INDEX: 28.57 KG/M2 | OXYGEN SATURATION: 98 % | HEIGHT: 56 IN | HEART RATE: 74 BPM | WEIGHT: 127 LBS

## 2024-01-05 VITALS — DIASTOLIC BLOOD PRESSURE: 80 MMHG | SYSTOLIC BLOOD PRESSURE: 134 MMHG

## 2024-01-05 PROCEDURE — 99214 OFFICE O/P EST MOD 30 MIN: CPT | Mod: 25

## 2024-01-05 PROCEDURE — 36415 COLL VENOUS BLD VENIPUNCTURE: CPT

## 2024-01-05 NOTE — PLAN
[FreeTextEntry1] : labs drawn  meds renewed  will try meloxicam for minor back pain as it worked in 2021

## 2024-01-05 NOTE — PHYSICAL EXAM
[No Acute Distress] : no acute distress [Normal Outer Ear/Nose] : the outer ears and nose were normal in appearance [No JVD] : no jugular venous distention [No Respiratory Distress] : no respiratory distress  [No Accessory Muscle Use] : no accessory muscle use [Clear to Auscultation] : lungs were clear to auscultation bilaterally [Normal Rate] : normal rate  [Regular Rhythm] : with a regular rhythm [No Edema] : there was no peripheral edema [No Extremity Clubbing/Cyanosis] : no extremity clubbing/cyanosis [Soft] : abdomen soft [Non Tender] : non-tender [Non-distended] : non-distended [No Masses] : no abdominal mass palpated [Grossly Normal Strength/Tone] : grossly normal strength/tone

## 2024-01-05 NOTE — HISTORY OF PRESENT ILLNESS
[de-identified] : Pt comes for FBW and med renewal  PT had covid on Dec 24th   Feeling better now  Had vaccine 2 weeks earlier

## 2024-01-08 ENCOUNTER — NON-APPOINTMENT (OUTPATIENT)
Age: 89
End: 2024-01-08

## 2024-01-08 LAB
ALBUMIN SERPL ELPH-MCNC: 3.7 G/DL
ALP BLD-CCNC: 316 U/L
ALT SERPL-CCNC: 35 U/L
ANION GAP SERPL CALC-SCNC: 11 MMOL/L
AST SERPL-CCNC: 31 U/L
BASOPHILS # BLD AUTO: 0.09 K/UL
BASOPHILS NFR BLD AUTO: 1.1 %
BILIRUB SERPL-MCNC: 0.5 MG/DL
BUN SERPL-MCNC: 38 MG/DL
CALCIUM SERPL-MCNC: 9.3 MG/DL
CHLORIDE SERPL-SCNC: 107 MMOL/L
CHOLEST SERPL-MCNC: 217 MG/DL
CO2 SERPL-SCNC: 19 MMOL/L
CREAT SERPL-MCNC: 1.88 MG/DL
EGFR: 25 ML/MIN/1.73M2
EOSINOPHIL # BLD AUTO: 0.96 K/UL
EOSINOPHIL NFR BLD AUTO: 11.2 %
ESTIMATED AVERAGE GLUCOSE: 140 MG/DL
GLUCOSE SERPL-MCNC: 145 MG/DL
HBA1C MFR BLD HPLC: 6.5 %
HCT VFR BLD CALC: 49.4 %
HDLC SERPL-MCNC: 46 MG/DL
HGB BLD-MCNC: 15.4 G/DL
IMM GRANULOCYTES NFR BLD AUTO: 0.5 %
LDLC SERPL CALC-MCNC: 114 MG/DL
LYMPHOCYTES # BLD AUTO: 2.84 K/UL
LYMPHOCYTES NFR BLD AUTO: 33.2 %
MAN DIFF?: NORMAL
MCHC RBC-ENTMCNC: 28.8 PG
MCHC RBC-ENTMCNC: 31.2 GM/DL
MCV RBC AUTO: 92.5 FL
MONOCYTES # BLD AUTO: 0.61 K/UL
MONOCYTES NFR BLD AUTO: 7.1 %
NEUTROPHILS # BLD AUTO: 4.01 K/UL
NEUTROPHILS NFR BLD AUTO: 46.9 %
NONHDLC SERPL-MCNC: 171 MG/DL
PLATELET # BLD AUTO: 260 K/UL
POTASSIUM SERPL-SCNC: 4.5 MMOL/L
PROT SERPL-MCNC: 7 G/DL
RBC # BLD: 5.34 M/UL
RBC # FLD: 14.7 %
SODIUM SERPL-SCNC: 137 MMOL/L
TRIGL SERPL-MCNC: 333 MG/DL
TSH SERPL-ACNC: 1.66 UIU/ML
WBC # FLD AUTO: 8.55 K/UL

## 2024-01-10 ENCOUNTER — APPOINTMENT (OUTPATIENT)
Dept: NEPHROLOGY | Facility: CLINIC | Age: 89
End: 2024-01-10
Payer: MEDICARE

## 2024-01-10 VITALS
BODY MASS INDEX: 28.34 KG/M2 | HEART RATE: 73 BPM | OXYGEN SATURATION: 98 % | DIASTOLIC BLOOD PRESSURE: 80 MMHG | TEMPERATURE: 97.5 F | HEIGHT: 56 IN | SYSTOLIC BLOOD PRESSURE: 142 MMHG | WEIGHT: 126 LBS

## 2024-01-10 PROCEDURE — 99205 OFFICE O/P NEW HI 60 MIN: CPT

## 2024-01-10 RX ORDER — NIRMATRELVIR AND RITONAVIR 300-100 MG
20 X 150 MG & KIT ORAL
Qty: 20 | Refills: 0 | Status: DISCONTINUED | COMMUNITY
Start: 2023-12-24 | End: 2024-01-10

## 2024-01-10 RX ORDER — MECLIZINE HYDROCHLORIDE 12.5 MG/1
12.5 TABLET ORAL 3 TIMES DAILY
Refills: 0 | Status: DISCONTINUED | COMMUNITY
End: 2024-01-10

## 2024-01-10 RX ORDER — NIRMATRELVIR AND RITONAVIR 300-100 MG
20 X 150 MG & KIT ORAL
Qty: 20 | Refills: 0 | Status: DISCONTINUED | COMMUNITY
Start: 2023-01-09 | End: 2024-01-10

## 2024-01-10 RX ORDER — MELOXICAM 15 MG/1
15 TABLET ORAL
Qty: 30 | Refills: 1 | Status: DISCONTINUED | COMMUNITY
Start: 2020-10-09 | End: 2024-01-10

## 2024-01-10 NOTE — PHYSICAL EXAM
[General Appearance - Alert] : alert [Sclera] : the sclera and conjunctiva were normal [Outer Ear] : the ears and nose were normal in appearance [Both Tympanic Membranes Were Examined] : both tympanic membranes were normal [Neck Appearance] : the appearance of the neck was normal [] : the neck was supple [Auscultation Breath Sounds / Voice Sounds] : lungs were clear to auscultation bilaterally [Heart Rate And Rhythm] : heart rate was normal and rhythm regular [Heart Sounds] : normal S1 and S2 [Edema] : there was no peripheral edema [Bowel Sounds] : normal bowel sounds [Abdomen Soft] : soft

## 2024-01-10 NOTE — REVIEW OF SYSTEMS
"Last OV 10/18/21 - Dr Aly   With PCP 8/2/21     lisinopril (ZESTRIL) 20 MG tablet     The original prescription was discontinued on 8/2/2021 by Dez Luo MD for the following reason: Medication Reconciliation Clean Up. Renewing this prescription may not be appropriate.       Med list has 10mg tablets but note on med list states \"patient is taking 20mg\"     Per pharmacy dispense records, pt filling 20mg daily     lisinopril (ZESTRIL) 10 MG tablet 90 tablet 3 5/28/2021  No   Sig: TAKE 1 TABLET DAILY   Sent to pharmacy as: Lisinopril 10 MG Oral Tablet (ZESTRIL)   Class: E-Prescribe   Order: [id number]   E-Prescribing Status: Receipt confirmed by pharmacy (5/28/2021 10:04 AM CDT)       " [Negative] : Genitourinary

## 2024-01-10 NOTE — ASSESSMENT
[FreeTextEntry1] : 89 yo woman with HTN and CKD by hx. Recent increase in creat appears to be due to increased dose of ARB as well as some use of NSAIDS. BP control is improved and acceptable, therefore, will continue current dose of ARB until repeal labs in 2 months. Renal sonogram done in 7/2023 was negative for significant post void residual volume but pt now reporting increased discomfort in lower abdomen--check repeat sonogrma.  --CKD with acute worsening as above.   BUN/CREAT : 38/1.88 ( 1/5/2024),  28/1.62 ( 10/9/2023),  39/1.4 ( 3/1/2023), 33/1.25 ( 12/7/2023),  22/1.15 ( 12/6/2022),  22/1.15 ( 12/6/2021)  --HTN : BP control acceptable.  Continue current ARB until repeat labs done.  --Pt advised to avoid all NSIADS.  Meloxicam d/crystal. --Recheck renal /bladder sonogram to rule out significant retention.

## 2024-01-10 NOTE — HISTORY OF PRESENT ILLNESS
[FreeTextEntry1] : Ms Contreras presents for evaluation of SEA. 89 yo woman with pmhx of HTN on Olmesartan 10 mg daily  with stable control for many years. BP suboptimal control recently and had Olmesartan increased to 15 mg  and then to current dose of 20 mg  daily. Noted for gradual increase in creat and presents for evaluation. Medication list was reviewed. Pt had been on Meloxicam on and off for ~ 2 years with new rx this month  due to low back pain. Admits to limited fluid intake due to frequent urination and now some low abdominal pressure since being told of need for renal evaluation.

## 2024-03-04 ENCOUNTER — APPOINTMENT (OUTPATIENT)
Dept: ULTRASOUND IMAGING | Facility: CLINIC | Age: 89
End: 2024-03-04
Payer: MEDICARE

## 2024-03-04 ENCOUNTER — OUTPATIENT (OUTPATIENT)
Dept: OUTPATIENT SERVICES | Facility: HOSPITAL | Age: 89
LOS: 1 days | End: 2024-03-04
Payer: MEDICARE

## 2024-03-04 DIAGNOSIS — N18.32 CHRONIC KIDNEY DISEASE, STAGE 3B: ICD-10-CM

## 2024-03-04 PROCEDURE — 76770 US EXAM ABDO BACK WALL COMP: CPT

## 2024-03-04 PROCEDURE — 76770 US EXAM ABDO BACK WALL COMP: CPT | Mod: 26

## 2024-03-06 ENCOUNTER — TRANSCRIPTION ENCOUNTER (OUTPATIENT)
Age: 89
End: 2024-03-06

## 2024-03-06 ENCOUNTER — APPOINTMENT (OUTPATIENT)
Dept: NEPHROLOGY | Facility: CLINIC | Age: 89
End: 2024-03-06
Payer: MEDICARE

## 2024-03-06 VITALS
HEIGHT: 56 IN | WEIGHT: 123 LBS | DIASTOLIC BLOOD PRESSURE: 78 MMHG | OXYGEN SATURATION: 98 % | HEART RATE: 64 BPM | SYSTOLIC BLOOD PRESSURE: 136 MMHG | BODY MASS INDEX: 27.67 KG/M2 | TEMPERATURE: 96.6 F

## 2024-03-06 LAB
ALBUMIN SERPL ELPH-MCNC: 3.7 G/DL
ANION GAP SERPL CALC-SCNC: 10 MMOL/L
APPEARANCE: CLEAR
BACTERIA: NEGATIVE /HPF
BASOPHILS # BLD AUTO: 0.07 K/UL
BASOPHILS NFR BLD AUTO: 0.9 %
BILIRUBIN URINE: NEGATIVE
BLOOD URINE: ABNORMAL
BUN SERPL-MCNC: 34 MG/DL
CALCIUM SERPL-MCNC: 9.3 MG/DL
CALCIUM SERPL-MCNC: 9.3 MG/DL
CAST: 0 /LPF
CHLORIDE SERPL-SCNC: 104 MMOL/L
CO2 SERPL-SCNC: 23 MMOL/L
COLOR: YELLOW
CREAT SERPL-MCNC: 1.68 MG/DL
CREAT SPEC-SCNC: 22 MG/DL
CREAT/PROT UR: 7.2 RATIO
EGFR: 29 ML/MIN/1.73M2
EOSINOPHIL # BLD AUTO: 0.61 K/UL
EOSINOPHIL NFR BLD AUTO: 7.9 %
EPITHELIAL CELLS: 1 /HPF
GLUCOSE QUALITATIVE U: NEGATIVE MG/DL
GLUCOSE SERPL-MCNC: 171 MG/DL
HCT VFR BLD CALC: 46.9 %
HGB BLD-MCNC: 14.8 G/DL
IMM GRANULOCYTES NFR BLD AUTO: 0.3 %
KETONES URINE: NEGATIVE MG/DL
LEUKOCYTE ESTERASE URINE: NEGATIVE
LYMPHOCYTES # BLD AUTO: 2.3 K/UL
LYMPHOCYTES NFR BLD AUTO: 29.9 %
MAN DIFF?: NORMAL
MCHC RBC-ENTMCNC: 28.5 PG
MCHC RBC-ENTMCNC: 31.6 GM/DL
MCV RBC AUTO: 90.4 FL
MICROSCOPIC-UA: NORMAL
MONOCYTES # BLD AUTO: 0.61 K/UL
MONOCYTES NFR BLD AUTO: 7.9 %
NEUTROPHILS # BLD AUTO: 4.09 K/UL
NEUTROPHILS NFR BLD AUTO: 53.1 %
NITRITE URINE: NEGATIVE
PARATHYROID HORMONE INTACT: 64 PG/ML
PH URINE: 6
PHOSPHATE SERPL-MCNC: 3.2 MG/DL
PLATELET # BLD AUTO: 210 K/UL
POTASSIUM SERPL-SCNC: 4.8 MMOL/L
PROT UR-MCNC: 157 MG/DL
PROTEIN URINE: 100 MG/DL
RBC # BLD: 5.19 M/UL
RBC # FLD: 15.4 %
RED BLOOD CELLS URINE: 6 /HPF
SODIUM SERPL-SCNC: 137 MMOL/L
SPECIFIC GRAVITY URINE: 1.01
UROBILINOGEN URINE: 0.2 MG/DL
WBC # FLD AUTO: 7.7 K/UL
WHITE BLOOD CELLS URINE: 1 /HPF

## 2024-03-06 PROCEDURE — 99214 OFFICE O/P EST MOD 30 MIN: CPT

## 2024-03-06 NOTE — PHYSICAL EXAM
[Sclera] : the sclera and conjunctiva were normal [General Appearance - Alert] : alert [Outer Ear] : the ears and nose were normal in appearance [Both Tympanic Membranes Were Examined] : both tympanic membranes were normal [Neck Appearance] : the appearance of the neck was normal [] : the neck was supple [Heart Rate And Rhythm] : heart rate was normal and rhythm regular [Auscultation Breath Sounds / Voice Sounds] : lungs were clear to auscultation bilaterally [Edema] : there was no peripheral edema [Heart Sounds] : normal S1 and S2 [Bowel Sounds] : normal bowel sounds [Abdomen Soft] : soft

## 2024-03-06 NOTE — CONSULT LETTER
[Courtesy Letter:] : I had the pleasure of seeing your patient, [unfilled], in my office today. [Please see my note below.] : Please see my note below. [Sincerely,] : Sincerely, [Consult Closing:] : Thank you very much for allowing me to participate in the care of this patient.  If you have any questions, please do not hesitate to contact me. [DrGayathri  ___] : Dr. GASCA

## 2024-03-06 NOTE — ASSESSMENT
[FreeTextEntry1] : 89 yo woman with HTN and CKD by hx. Recent increase in creat appears to be due to increased dose of ARB as well as some use of NSAIDS. BP control is improved and acceptable, therefore, will continue current dose of ARB until repeal labs in 2 months. Renal sonogram done in 7/2023 was negative for significant post void residual volume but pt now reporting increased discomfort in lower abdomen--check repeat sonogrma.  --CKD : slightly improved with continued ARB.   BUN/CREAT :  34/1.68 ( 3/4/2024),  38/1.88 ( 1/5/2024),  28/1.62 ( 10/9/2023),  39/1.4 ( 3/1/2023), 33/1.25 ( 12/7/2023),  22/1.15 ( 12/6/2022),  22/1.15 ( 12/6/2021)      Renal USG : R 10.5 cm, L 9.4 cm,  Left kidney with increased echogenicity. --Proteinuria : no previous UA available.    Unclear duration and etiology,  ? related to NSAIDS.   At this age, would not consider bx but check serologies.   Prot/creat : 7.2  --HTN : BP control acceptable.  Continue current ARB until repeat labs done.  --Pt advised to avoid all NSIADS.  Meloxicam d/crystal.

## 2024-03-06 NOTE — HISTORY OF PRESENT ILLNESS
[FreeTextEntry1] : Ms Contreras presents for follow up of SEA/CKD. Feels fairly well. No new complaints. Reviewed labs and USG with pt and her daughter.

## 2024-03-19 ENCOUNTER — APPOINTMENT (OUTPATIENT)
Dept: ORTHOPEDIC SURGERY | Facility: CLINIC | Age: 89
End: 2024-03-19
Payer: MEDICARE

## 2024-03-19 VITALS — BODY MASS INDEX: 27.67 KG/M2 | HEIGHT: 56 IN | WEIGHT: 123 LBS

## 2024-03-19 DIAGNOSIS — M67.471 GANGLION, RIGHT ANKLE AND FOOT: ICD-10-CM

## 2024-03-19 DIAGNOSIS — S82.64XA NONDISPLACED FRACTURE OF LATERAL MALLEOLUS OF RIGHT FIBULA, INITIAL ENCOUNTER FOR CLOSED FRACTURE: ICD-10-CM

## 2024-03-19 PROCEDURE — L4350: CPT | Mod: KX,RT

## 2024-03-19 PROCEDURE — 27786 TREATMENT OF ANKLE FRACTURE: CPT | Mod: RT

## 2024-03-19 PROCEDURE — 73630 X-RAY EXAM OF FOOT: CPT | Mod: RT

## 2024-03-19 PROCEDURE — 99204 OFFICE O/P NEW MOD 45 MIN: CPT | Mod: 25

## 2024-03-19 PROCEDURE — 73600 X-RAY EXAM OF ANKLE: CPT | Mod: RT

## 2024-03-19 NOTE — HISTORY OF PRESENT ILLNESS
[Dull/Aching] : dull/aching [Sharp] : sharp [Intermittent] : intermittent [de-identified] : 03/19/2024: fall 3 days ago w/ foot/ankle pain. no treatment to date. walking in regular shoes. no prior ankle probs. +pre DM. no tob.  [] : This patient has had an injection before: no [FreeTextEntry1] : R ankle  [FreeTextEntry3] : 03/16/24

## 2024-03-19 NOTE — IMAGING
[Right] : right ankle [FreeTextEntry9] : likely non disp distal fib fx [de-identified] : likely non disp distal fib fx

## 2024-03-19 NOTE — PHYSICAL EXAM
[Right] : right foot and ankle [NL (40)] : plantar flexion 40 degrees [5___] : plantar flexion 5[unfilled]/5 [2+] : dorsalis pedis pulse: 2+ [] : patient ambulates without assistive device [FreeTextEntry3] : lateral ankle cyst -- mobile and rubbery c/w ganglion [FreeTextEntry8] : no ttp at cyst

## 2024-04-01 ENCOUNTER — APPOINTMENT (OUTPATIENT)
Dept: INTERNAL MEDICINE | Facility: CLINIC | Age: 89
End: 2024-04-01
Payer: MEDICARE

## 2024-04-01 VITALS — BODY MASS INDEX: 27.9 KG/M2 | HEART RATE: 67 BPM | HEIGHT: 56 IN | WEIGHT: 124 LBS | OXYGEN SATURATION: 97 %

## 2024-04-01 VITALS — DIASTOLIC BLOOD PRESSURE: 80 MMHG | SYSTOLIC BLOOD PRESSURE: 132 MMHG

## 2024-04-01 DIAGNOSIS — R73.9 HYPERGLYCEMIA, UNSPECIFIED: ICD-10-CM

## 2024-04-01 DIAGNOSIS — K21.9 GASTRO-ESOPHAGEAL REFLUX DISEASE W/OUT ESOPHAGITIS: ICD-10-CM

## 2024-04-01 PROCEDURE — 99214 OFFICE O/P EST MOD 30 MIN: CPT

## 2024-04-01 PROCEDURE — G2211 COMPLEX E/M VISIT ADD ON: CPT

## 2024-04-01 PROCEDURE — 36415 COLL VENOUS BLD VENIPUNCTURE: CPT

## 2024-04-01 RX ORDER — OLMESARTAN MEDOXOMIL 20 MG/1
20 TABLET, FILM COATED ORAL
Qty: 90 | Refills: 0 | Status: ACTIVE | COMMUNITY
Start: 2019-09-09 | End: 1900-01-01

## 2024-04-01 RX ORDER — METOPROLOL SUCCINATE 25 MG/1
25 TABLET, EXTENDED RELEASE ORAL
Qty: 90 | Refills: 0 | Status: ACTIVE | COMMUNITY
Start: 2019-09-09 | End: 1900-01-01

## 2024-04-01 RX ORDER — RABEPRAZOLE SODIUM 20 MG/1
20 TABLET, DELAYED RELEASE ORAL
Qty: 90 | Refills: 0 | Status: ACTIVE | COMMUNITY
Start: 2019-09-09 | End: 1900-01-01

## 2024-04-01 NOTE — HEALTH RISK ASSESSMENT
[0] : 2) Feeling down, depressed, or hopeless: Not at all (0) [PHQ-2 Negative - No further assessment needed] : PHQ-2 Negative - No further assessment needed [[id number]] : 0 [Never] : Never Family called for

## 2024-04-01 NOTE — PHYSICAL EXAM
[No Acute Distress] : no acute distress [Normal Outer Ear/Nose] : the outer ears and nose were normal in appearance [No JVD] : no jugular venous distention [No Respiratory Distress] : no respiratory distress  [No Accessory Muscle Use] : no accessory muscle use [Clear to Auscultation] : lungs were clear to auscultation bilaterally [Normal Rate] : normal rate  [Regular Rhythm] : with a regular rhythm [No Edema] : there was no peripheral edema [No Extremity Clubbing/Cyanosis] : no extremity clubbing/cyanosis [Soft] : abdomen soft [Non Tender] : non-tender [Non-distended] : non-distended [No Masses] : no abdominal mass palpated [Grossly Normal Strength/Tone] : grossly normal strength/tone [de-identified] : prosthethic eye

## 2024-04-02 ENCOUNTER — APPOINTMENT (OUTPATIENT)
Dept: ORTHOPEDIC SURGERY | Facility: CLINIC | Age: 89
End: 2024-04-02

## 2024-04-10 ENCOUNTER — APPOINTMENT (OUTPATIENT)
Dept: INTERNAL MEDICINE | Facility: CLINIC | Age: 89
End: 2024-04-10
Payer: MEDICARE

## 2024-04-10 VITALS — DIASTOLIC BLOOD PRESSURE: 82 MMHG | SYSTOLIC BLOOD PRESSURE: 132 MMHG

## 2024-04-10 VITALS — HEART RATE: 72 BPM | WEIGHT: 124 LBS | HEIGHT: 56 IN | OXYGEN SATURATION: 97 % | BODY MASS INDEX: 27.9 KG/M2

## 2024-04-10 DIAGNOSIS — R74.8 ABNORMAL LEVELS OF OTHER SERUM ENZYMES: ICD-10-CM

## 2024-04-10 LAB
ALBUMIN SERPL ELPH-MCNC: 3.9 G/DL
ALP BLD-CCNC: 448 U/L
ALT SERPL-CCNC: 39 U/L
ANION GAP SERPL CALC-SCNC: 13 MMOL/L
AST SERPL-CCNC: 39 U/L
BASOPHILS # BLD AUTO: 0.08 K/UL
BASOPHILS NFR BLD AUTO: 1 %
BILIRUB SERPL-MCNC: 0.6 MG/DL
BUN SERPL-MCNC: 38 MG/DL
CALCIUM SERPL-MCNC: 9.6 MG/DL
CHLORIDE SERPL-SCNC: 107 MMOL/L
CHOLEST SERPL-MCNC: 195 MG/DL
CO2 SERPL-SCNC: 22 MMOL/L
CREAT SERPL-MCNC: 2.04 MG/DL
CREAT SPEC-SCNC: 92 MG/DL
EGFR: 23 ML/MIN/1.73M2
EOSINOPHIL # BLD AUTO: 0.7 K/UL
EOSINOPHIL NFR BLD AUTO: 9.1 %
ESTIMATED AVERAGE GLUCOSE: 140 MG/DL
GLUCOSE SERPL-MCNC: 153 MG/DL
HBA1C MFR BLD HPLC: 6.5 %
HCT VFR BLD CALC: 50.3 %
HDLC SERPL-MCNC: 65 MG/DL
HGB BLD-MCNC: 15.8 G/DL
IMM GRANULOCYTES NFR BLD AUTO: 0.4 %
LDLC SERPL CALC-MCNC: 99 MG/DL
LYMPHOCYTES # BLD AUTO: 2.58 K/UL
LYMPHOCYTES NFR BLD AUTO: 33.5 %
MAN DIFF?: NORMAL
MCHC RBC-ENTMCNC: 29.2 PG
MCHC RBC-ENTMCNC: 31.4 GM/DL
MCV RBC AUTO: 92.8 FL
MICROALBUMIN 24H UR DL<=1MG/L-MCNC: 250.8 MG/DL
MICROALBUMIN/CREAT 24H UR-RTO: 2726 MG/G
MONOCYTES # BLD AUTO: 0.6 K/UL
MONOCYTES NFR BLD AUTO: 7.8 %
NEUTROPHILS # BLD AUTO: 3.72 K/UL
NEUTROPHILS NFR BLD AUTO: 48.2 %
NONHDLC SERPL-MCNC: 130 MG/DL
PLATELET # BLD AUTO: 228 K/UL
POTASSIUM SERPL-SCNC: 4.9 MMOL/L
PROT SERPL-MCNC: 7.4 G/DL
RBC # BLD: 5.42 M/UL
RBC # FLD: 15.5 %
SODIUM SERPL-SCNC: 142 MMOL/L
TRIGL SERPL-MCNC: 184 MG/DL
TSH SERPL-ACNC: 1.56 UIU/ML
WBC # FLD AUTO: 7.71 K/UL

## 2024-04-10 PROCEDURE — 99213 OFFICE O/P EST LOW 20 MIN: CPT

## 2024-04-10 PROCEDURE — 36415 COLL VENOUS BLD VENIPUNCTURE: CPT

## 2024-04-10 PROCEDURE — G2211 COMPLEX E/M VISIT ADD ON: CPT

## 2024-04-10 NOTE — HISTORY OF PRESENT ILLNESS
[de-identified] : Pt comes for repeat blood work. Daughter and pt aware of increased creat and increased alk phos levels  PT drinking normally and uri urinating normally

## 2024-04-10 NOTE — PHYSICAL EXAM
[No Acute Distress] : no acute distress [Normal Sclera/Conjunctiva] : normal sclera/conjunctiva [Normal Outer Ear/Nose] : the outer ears and nose were normal in appearance [No JVD] : no jugular venous distention [No Respiratory Distress] : no respiratory distress  [No Accessory Muscle Use] : no accessory muscle use [Clear to Auscultation] : lungs were clear to auscultation bilaterally [Normal Rate] : normal rate  [Regular Rhythm] : with a regular rhythm [No Edema] : there was no peripheral edema [No Extremity Clubbing/Cyanosis] : no extremity clubbing/cyanosis [Soft] : abdomen soft [Non Tender] : non-tender [No Masses] : no abdominal mass palpated [Grossly Normal Strength/Tone] : grossly normal strength/tone

## 2024-04-12 LAB
ALBUMIN SERPL ELPH-MCNC: 3.9 G/DL
ALP BLD-CCNC: 404 U/L
ALT SERPL-CCNC: 31 U/L
ANION GAP SERPL CALC-SCNC: 14 MMOL/L
AST SERPL-CCNC: 34 U/L
BILIRUB SERPL-MCNC: 0.7 MG/DL
BUN SERPL-MCNC: 37 MG/DL
CALCIUM SERPL-MCNC: 9.7 MG/DL
CHLORIDE SERPL-SCNC: 107 MMOL/L
CO2 SERPL-SCNC: 22 MMOL/L
CREAT SERPL-MCNC: 1.97 MG/DL
EGFR: 24 ML/MIN/1.73M2
GLUCOSE SERPL-MCNC: 136 MG/DL
POTASSIUM SERPL-SCNC: 5.2 MMOL/L
PROT SERPL-MCNC: 7.3 G/DL
SODIUM SERPL-SCNC: 142 MMOL/L

## 2024-04-17 LAB
ALP BLD-CCNC: 399 IU/L
ALP BONE CFR SERPL: 47 %
ALP INTEST CFR SERPL: 0 %
ALP LIVER CFR SERPL: 53 %

## 2024-04-19 ENCOUNTER — NON-APPOINTMENT (OUTPATIENT)
Age: 89
End: 2024-04-19

## 2024-05-01 ENCOUNTER — APPOINTMENT (OUTPATIENT)
Dept: NEPHROLOGY | Facility: CLINIC | Age: 89
End: 2024-05-01
Payer: MEDICARE

## 2024-05-01 VITALS
WEIGHT: 125 LBS | BODY MASS INDEX: 28.12 KG/M2 | HEART RATE: 67 BPM | DIASTOLIC BLOOD PRESSURE: 72 MMHG | OXYGEN SATURATION: 97 % | SYSTOLIC BLOOD PRESSURE: 132 MMHG | HEIGHT: 56 IN | TEMPERATURE: 97 F

## 2024-05-01 PROCEDURE — 99214 OFFICE O/P EST MOD 30 MIN: CPT

## 2024-05-01 NOTE — ASSESSMENT
[FreeTextEntry1] : 89 yo woman with HTN and CKD by hx. Recent increase in creat appears to be due to increased dose of ARB as well as some use of NSAIDS. BP control is improved and acceptable, therefore, will continue current dose of ARB until repeal labs in 2 months. Renal sonogram done in 7/2023 was negative for significant post void residual volume but pt now reporting increased discomfort in lower abdomen--check repeat sonogrma.  --CKD : creat slightly increased again.   Pt again advised to avoid all NSAIDS.    Continue ARB at current dose now with stable BP control.   BUN/CREAT :  37/1.97 ( 4/10/2024),   34/1.68 ( 3/4/2024),  38/1.88 ( 1/5/2024),  28/1.62 ( 10/9/2023),  39/1.4 ( 3/1/2023), 33/1.25 ( 12/7/2023),  22/1.15 ( 12/6/2022),  22/1.15 ( 12/6/2021)      Renal USG : R 10.5 cm, L 9.4 cm,  Left kidney with increased echogenicity.  --Proteinuria :  improved.  unclear if related to recent NSAIDS.  continue to trend on current dose of ARB.   Prot/creat :   2.7 ( 4/10/2024),  7.2 ( 3/4/2024),  --HTN : BP control acceptable.  Continue current ARB ,  may need to adjust if further increase in creat.  --Pt advised to avoid all NSIADS.  Meloxicam d/crystal. --Again exp, considering pt's age, would avoid invasive /aggressive work up.

## 2024-05-01 NOTE — CONSULT LETTER
[Courtesy Letter:] : I had the pleasure of seeing your patient, [unfilled], in my office today. [Please see my note below.] : Please see my note below. [Consult Closing:] : Thank you very much for allowing me to participate in the care of this patient.  If you have any questions, please do not hesitate to contact me. [Sincerely,] : Sincerely, [DrGayathri  ___] : Dr. GASCA

## 2024-05-01 NOTE — HISTORY OF PRESENT ILLNESS
[FreeTextEntry1] : Ms Contreras presents for follow up of SEA/CKD. Feels fairly well. No new complaints but continues with discomfort due to nocturia.

## 2024-05-14 ENCOUNTER — TRANSCRIPTION ENCOUNTER (OUTPATIENT)
Age: 89
End: 2024-05-14

## 2024-05-15 ENCOUNTER — APPOINTMENT (OUTPATIENT)
Dept: INTERNAL MEDICINE | Facility: CLINIC | Age: 89
End: 2024-05-15
Payer: MEDICARE

## 2024-05-15 VITALS — WEIGHT: 126 LBS | HEIGHT: 56 IN | BODY MASS INDEX: 28.34 KG/M2 | HEART RATE: 80 BPM | OXYGEN SATURATION: 98 %

## 2024-05-15 VITALS — SYSTOLIC BLOOD PRESSURE: 132 MMHG | DIASTOLIC BLOOD PRESSURE: 80 MMHG

## 2024-05-15 DIAGNOSIS — E78.5 HYPERLIPIDEMIA, UNSPECIFIED: ICD-10-CM

## 2024-05-15 DIAGNOSIS — E53.8 DEFICIENCY OF OTHER SPECIFIED B GROUP VITAMINS: ICD-10-CM

## 2024-05-15 DIAGNOSIS — N18.32 CHRONIC KIDNEY DISEASE, STAGE 3B: ICD-10-CM

## 2024-05-15 DIAGNOSIS — R53.83 OTHER FATIGUE: ICD-10-CM

## 2024-05-15 DIAGNOSIS — R80.9 PROTEINURIA, UNSPECIFIED: ICD-10-CM

## 2024-05-15 DIAGNOSIS — I10 ESSENTIAL (PRIMARY) HYPERTENSION: ICD-10-CM

## 2024-05-15 PROCEDURE — 99214 OFFICE O/P EST MOD 30 MIN: CPT | Mod: 25

## 2024-05-15 PROCEDURE — 96372 THER/PROPH/DIAG INJ SC/IM: CPT

## 2024-05-15 PROCEDURE — 36415 COLL VENOUS BLD VENIPUNCTURE: CPT

## 2024-05-15 RX ORDER — CYANOCOBALAMIN 1000 UG/ML
1000 INJECTION INTRAMUSCULAR; SUBCUTANEOUS
Qty: 0 | Refills: 0 | Status: COMPLETED | OUTPATIENT
Start: 2024-05-15

## 2024-05-15 RX ADMIN — CYANOCOBALAMIN 0 MCG/ML: 1000 INJECTION INTRAMUSCULAR; SUBCUTANEOUS at 00:00

## 2024-05-15 NOTE — PHYSICAL EXAM
[No Acute Distress] : no acute distress [Normal Sclera/Conjunctiva] : normal sclera/conjunctiva [Normal Outer Ear/Nose] : the outer ears and nose were normal in appearance [No JVD] : no jugular venous distention [No Respiratory Distress] : no respiratory distress  [No Accessory Muscle Use] : no accessory muscle use [Clear to Auscultation] : lungs were clear to auscultation bilaterally [Normal Rate] : normal rate  [Regular Rhythm] : with a regular rhythm [No Carotid Bruits] : no carotid bruits [No Edema] : there was no peripheral edema [No Extremity Clubbing/Cyanosis] : no extremity clubbing/cyanosis [Soft] : abdomen soft [Non Tender] : non-tender [Non-distended] : non-distended

## 2024-05-15 NOTE — REVIEW OF SYSTEMS
[Fever] : no fever [Chills] : no chills [Fatigue] : fatigue [Frequency] : frequency [Negative] : Gastrointestinal

## 2024-05-15 NOTE — HISTORY OF PRESENT ILLNESS
[de-identified] : Pt comes for tiredness as reported by family  Pt feels she is not changed  Pt has not had renal blood work in one month  Missed cardio  and told to decrease toprol

## 2024-05-17 ENCOUNTER — NON-APPOINTMENT (OUTPATIENT)
Age: 89
End: 2024-05-17

## 2024-05-17 LAB
ALBUMIN SERPL ELPH-MCNC: 3.8 G/DL
ALP BLD-CCNC: 374 U/L
ALT SERPL-CCNC: 29 U/L
ANION GAP SERPL CALC-SCNC: 13 MMOL/L
AST SERPL-CCNC: 33 U/L
BASOPHILS # BLD AUTO: 0.09 K/UL
BASOPHILS NFR BLD AUTO: 1.2 %
BILIRUB SERPL-MCNC: 0.6 MG/DL
BUN SERPL-MCNC: 31 MG/DL
CALCIUM SERPL-MCNC: 9.2 MG/DL
CHLORIDE SERPL-SCNC: 108 MMOL/L
CO2 SERPL-SCNC: 19 MMOL/L
CREAT SERPL-MCNC: 2.01 MG/DL
EGFR: 23 ML/MIN/1.73M2
EOSINOPHIL # BLD AUTO: 0.67 K/UL
EOSINOPHIL NFR BLD AUTO: 8.7 %
FERRITIN SERPL-MCNC: 131 NG/ML
GLUCOSE SERPL-MCNC: 152 MG/DL
HCT VFR BLD CALC: 48.5 %
HGB BLD-MCNC: 15.6 G/DL
IMM GRANULOCYTES NFR BLD AUTO: 0.3 %
IRON SATN MFR SERPL: 19 %
IRON SERPL-MCNC: 70 UG/DL
LYMPHOCYTES # BLD AUTO: 2.48 K/UL
LYMPHOCYTES NFR BLD AUTO: 32.3 %
MAN DIFF?: NORMAL
MCHC RBC-ENTMCNC: 29.4 PG
MCHC RBC-ENTMCNC: 32.2 GM/DL
MCV RBC AUTO: 91.3 FL
MONOCYTES # BLD AUTO: 0.56 K/UL
MONOCYTES NFR BLD AUTO: 7.3 %
NEUTROPHILS # BLD AUTO: 3.86 K/UL
NEUTROPHILS NFR BLD AUTO: 50.2 %
PLATELET # BLD AUTO: 234 K/UL
POTASSIUM SERPL-SCNC: 4.7 MMOL/L
PROT SERPL-MCNC: 6.9 G/DL
RBC # BLD: 5.31 M/UL
RBC # FLD: 15.1 %
SODIUM SERPL-SCNC: 140 MMOL/L
TIBC SERPL-MCNC: 366 UG/DL
TSH SERPL-ACNC: 1.77 UIU/ML
UIBC SERPL-MCNC: 296 UG/DL
VIT B12 SERPL-MCNC: 703 PG/ML
WBC # FLD AUTO: 7.68 K/UL

## 2024-07-12 ENCOUNTER — APPOINTMENT (OUTPATIENT)
Dept: INTERNAL MEDICINE | Facility: CLINIC | Age: 89
End: 2024-07-12
Payer: MEDICARE

## 2024-07-12 VITALS — OXYGEN SATURATION: 95 % | WEIGHT: 125 LBS | HEIGHT: 56 IN | HEART RATE: 77 BPM | BODY MASS INDEX: 28.12 KG/M2

## 2024-07-12 VITALS — DIASTOLIC BLOOD PRESSURE: 78 MMHG | SYSTOLIC BLOOD PRESSURE: 132 MMHG

## 2024-07-12 DIAGNOSIS — K21.9 GASTRO-ESOPHAGEAL REFLUX DISEASE W/OUT ESOPHAGITIS: ICD-10-CM

## 2024-07-12 DIAGNOSIS — E78.5 HYPERLIPIDEMIA, UNSPECIFIED: ICD-10-CM

## 2024-07-12 DIAGNOSIS — R74.8 ABNORMAL LEVELS OF OTHER SERUM ENZYMES: ICD-10-CM

## 2024-07-12 DIAGNOSIS — N18.32 CHRONIC KIDNEY DISEASE, STAGE 3B: ICD-10-CM

## 2024-07-12 DIAGNOSIS — R73.9 HYPERGLYCEMIA, UNSPECIFIED: ICD-10-CM

## 2024-07-12 PROCEDURE — 99214 OFFICE O/P EST MOD 30 MIN: CPT

## 2024-07-12 PROCEDURE — G2211 COMPLEX E/M VISIT ADD ON: CPT

## 2024-07-12 PROCEDURE — 36415 COLL VENOUS BLD VENIPUNCTURE: CPT

## 2024-07-14 ENCOUNTER — NON-APPOINTMENT (OUTPATIENT)
Age: 89
End: 2024-07-14

## 2024-07-15 LAB
ALBUMIN SERPL ELPH-MCNC: 3.5 G/DL
ALP BLD-CCNC: 460 U/L
ALT SERPL-CCNC: 28 U/L
ANION GAP SERPL CALC-SCNC: 16 MMOL/L
AST SERPL-CCNC: 33 U/L
BASOPHILS # BLD AUTO: 0.1 K/UL
BASOPHILS NFR BLD AUTO: 1 %
BILIRUB SERPL-MCNC: 0.5 MG/DL
BUN SERPL-MCNC: 39 MG/DL
CALCIUM SERPL-MCNC: 9.4 MG/DL
CHLORIDE SERPL-SCNC: 107 MMOL/L
CHOLEST SERPL-MCNC: 188 MG/DL
CO2 SERPL-SCNC: 17 MMOL/L
CREAT SERPL-MCNC: 2.23 MG/DL
EGFR: 20 ML/MIN/1.73M2
EOSINOPHIL # BLD AUTO: 0.72 K/UL
EOSINOPHIL NFR BLD AUTO: 7.4 %
ESTIMATED AVERAGE GLUCOSE: 160 MG/DL
GLUCOSE SERPL-MCNC: 151 MG/DL
HBA1C MFR BLD HPLC: 7.2 %
HCT VFR BLD CALC: 51.4 %
HDLC SERPL-MCNC: 56 MG/DL
HGB BLD-MCNC: 16 G/DL
IMM GRANULOCYTES NFR BLD AUTO: 0.4 %
LDLC SERPL CALC-MCNC: 99 MG/DL
LYMPHOCYTES # BLD AUTO: 2.52 K/UL
MAN DIFF?: NORMAL
MCHC RBC-ENTMCNC: 29 PG
MCHC RBC-ENTMCNC: 31.1 GM/DL
MONOCYTES # BLD AUTO: 0.73 K/UL
MONOCYTES NFR BLD AUTO: 7.5 %
NEUTROPHILS # BLD AUTO: 5.63 K/UL
NEUTROPHILS NFR BLD AUTO: 57.8 %
NONHDLC SERPL-MCNC: 132 MG/DL
PLATELET # BLD AUTO: 258 K/UL
POTASSIUM SERPL-SCNC: 5.1 MMOL/L
PROT SERPL-MCNC: 7.3 G/DL
RBC # BLD: 5.52 M/UL
RBC # FLD: 15 %
SODIUM SERPL-SCNC: 140 MMOL/L
TRIGL SERPL-MCNC: 190 MG/DL
WBC # FLD AUTO: 9.74 K/UL

## 2024-07-22 ENCOUNTER — APPOINTMENT (OUTPATIENT)
Dept: INTERNAL MEDICINE | Facility: CLINIC | Age: 89
End: 2024-07-22
Payer: MEDICARE

## 2024-07-22 VITALS — OXYGEN SATURATION: 97 % | HEIGHT: 56 IN | BODY MASS INDEX: 28.12 KG/M2 | HEART RATE: 76 BPM | WEIGHT: 125 LBS

## 2024-07-22 VITALS — SYSTOLIC BLOOD PRESSURE: 134 MMHG | DIASTOLIC BLOOD PRESSURE: 80 MMHG

## 2024-07-22 VITALS — TEMPERATURE: 98.1 F

## 2024-07-22 DIAGNOSIS — I10 ESSENTIAL (PRIMARY) HYPERTENSION: ICD-10-CM

## 2024-07-22 DIAGNOSIS — J02.9 ACUTE PHARYNGITIS, UNSPECIFIED: ICD-10-CM

## 2024-07-22 DIAGNOSIS — R05.9 COUGH, UNSPECIFIED: ICD-10-CM

## 2024-07-22 DIAGNOSIS — J40 BRONCHITIS, NOT SPECIFIED AS ACUTE OR CHRONIC: ICD-10-CM

## 2024-07-22 PROCEDURE — G2211 COMPLEX E/M VISIT ADD ON: CPT

## 2024-07-22 PROCEDURE — 99214 OFFICE O/P EST MOD 30 MIN: CPT

## 2024-07-22 RX ORDER — AZITHROMYCIN 250 MG/1
250 TABLET, FILM COATED ORAL
Qty: 1 | Refills: 0 | Status: ACTIVE | COMMUNITY
Start: 2024-07-22 | End: 1900-01-01

## 2024-07-22 RX ORDER — GUAIFENESIN 600 MG/1
600 TABLET, EXTENDED RELEASE ORAL TWICE DAILY
Qty: 14 | Refills: 0 | Status: ACTIVE | COMMUNITY
Start: 2024-07-22 | End: 1900-01-01

## 2024-07-22 NOTE — HISTORY OF PRESENT ILLNESS
[FreeTextEntry8] : Pt comes in for cough, chest congestion, night sweats. Accompanied by daughter. Says one week ago her chest felt "heavy", then on Sunday 7/21 started having dry cough, feels like she has mucus in chest but can't get it up. Per daughter patient was in bed and on couch all day yesterday. Has mild sore throat, but no fever. Did rapid covid test yesterday which was negative. Says she feels worse today. Tylenol helps a little with sore throat. No diarrhea, muscle aches, loss of taste or smell. Denies chest pain, shortness of breath, leg swelling, palpitations.  [Family Member] : family member

## 2024-07-22 NOTE — PHYSICAL EXAM
[No Acute Distress] : no acute distress [Well Nourished] : well nourished [Well Developed] : well developed [Well-Appearing] : well-appearing [Normal Sclera/Conjunctiva] : normal sclera/conjunctiva [PERRL] : pupils equal round and reactive to light [EOMI] : extraocular movements intact [Normal Outer Ear/Nose] : the outer ears and nose were normal in appearance [No JVD] : no jugular venous distention [No Lymphadenopathy] : no lymphadenopathy [Supple] : supple [Thyroid Normal, No Nodules] : the thyroid was normal and there were no nodules present [No Respiratory Distress] : no respiratory distress  [No Accessory Muscle Use] : no accessory muscle use [Normal Rate] : normal rate  [Regular Rhythm] : with a regular rhythm [Normal S1, S2] : normal S1 and S2 [No Murmur] : no murmur heard [No Carotid Bruits] : no carotid bruits [No Abdominal Bruit] : a ~M bruit was not heard ~T in the abdomen [No Varicosities] : no varicosities [Pedal Pulses Present] : the pedal pulses are present [No Edema] : there was no peripheral edema [No Palpable Aorta] : no palpable aorta [No Extremity Clubbing/Cyanosis] : no extremity clubbing/cyanosis [Soft] : abdomen soft [Non Tender] : non-tender [Non-distended] : non-distended [No Masses] : no abdominal mass palpated [No HSM] : no HSM [Normal Bowel Sounds] : normal bowel sounds [Normal Posterior Cervical Nodes] : no posterior cervical lymphadenopathy [Normal Anterior Cervical Nodes] : no anterior cervical lymphadenopathy [No CVA Tenderness] : no CVA  tenderness [No Spinal Tenderness] : no spinal tenderness [No Joint Swelling] : no joint swelling [Grossly Normal Strength/Tone] : grossly normal strength/tone [No Rash] : no rash [Coordination Grossly Intact] : coordination grossly intact [No Focal Deficits] : no focal deficits [Normal Gait] : normal gait [Deep Tendon Reflexes (DTR)] : deep tendon reflexes were 2+ and symmetric [Normal Affect] : the affect was normal [Normal Insight/Judgement] : insight and judgment were intact [de-identified] : mild posterior oropharynx erythema, no pus [de-identified] : mild rhonchi bilaterally

## 2024-07-22 NOTE — ASSESSMENT
[FreeTextEntry1] : bp slightly elevated 134/80, recommend checking bp at home while on steroids, has cuff at home. Prednisone 20mg x 3 days, 10mg x 3 days then stop  saturating 97% on RA at rest, temp 98.1F. Tested negative for covid using rapid test yesterday, still would do COVID PCR, order placed. z pack 5 days cepacol lozenges prn gargle with warm water with salt twice a day  mucinex prn  f/u in 1 week

## 2024-07-22 NOTE — REVIEW OF SYSTEMS
[Fatigue] : fatigue [Night Sweats] : night sweats [Sore Throat] : sore throat [Cough] : cough [Negative] : Heme/Lymph

## 2024-08-02 ENCOUNTER — APPOINTMENT (OUTPATIENT)
Dept: NEPHROLOGY | Facility: CLINIC | Age: 89
End: 2024-08-02
Payer: MEDICARE

## 2024-08-02 VITALS
DIASTOLIC BLOOD PRESSURE: 78 MMHG | OXYGEN SATURATION: 98 % | HEIGHT: 56 IN | WEIGHT: 120 LBS | TEMPERATURE: 95 F | BODY MASS INDEX: 26.99 KG/M2 | SYSTOLIC BLOOD PRESSURE: 132 MMHG | HEART RATE: 68 BPM

## 2024-08-02 DIAGNOSIS — N18.32 CHRONIC KIDNEY DISEASE, STAGE 3B: ICD-10-CM

## 2024-08-02 PROCEDURE — 99214 OFFICE O/P EST MOD 30 MIN: CPT

## 2024-08-02 RX ORDER — AMLODIPINE BESYLATE 2.5 MG/1
2.5 TABLET ORAL
Qty: 30 | Refills: 5 | Status: ACTIVE | COMMUNITY
Start: 2024-08-02 | End: 1900-01-01

## 2024-08-02 RX ORDER — PREDNISONE 10 MG/1
10 TABLET ORAL
Qty: 9 | Refills: 0 | Status: DISCONTINUED | COMMUNITY
Start: 2024-07-22 | End: 2024-08-02

## 2024-08-02 NOTE — HISTORY OF PRESENT ILLNESS
[FreeTextEntry1] : Ms Contreras presents for follow up of SEA/CKD. Feeling fairly well. Pt is concerned about increased creatinine. Otherwise no new complaints.

## 2024-08-02 NOTE — ASSESSMENT
[FreeTextEntry1] : 89 yo woman with HTN and CKD by hx. Recent increase in creat appears to be due to increased dose of ARB as well as some use of NSAIDS. BP control is improved and acceptable, therefore, will continue current dose of ARB until repeal labs in 2 months. Renal sonogram done in 7/2023 was negative for significant post void residual volume but pt now reporting increased discomfort in lower abdomen--check repeat sonogrma.  --CKD : Creat trending up--d/c ARB for now.   BUN/CREAT :   39/2.23 ( 7/13/2024)  37/1.97 ( 4/10/2024),   34/1.68 ( 3/4/2024),  38/1.88 ( 1/5/2024),  28/1.62 ( 10/9/2023),  39/1.4 ( 3/1/2023), 33/1.25 ( 12/7/2023),  22/1.15 ( 12/6/2022),  22/1.15 ( 12/6/2021)      Renal USG : R 10.5 cm, L 9.4 cm,  Left kidney with increased echogenicity.  --Proteinuria :  improved.  check repeat to evaluate for primary renal process.   Prot/creat :   2.7 ( 4/10/2024),  7.2 ( 3/4/2024),  --HTN : BP control acceptable. d/c ARB, start low dose Amlodipine.  --Pt advised to avoid all NSIADS.  Meloxicam d/crystal.  --Again exp, considering pt's age, would avoid invasive /aggressive work up.

## 2024-08-14 ENCOUNTER — APPOINTMENT (OUTPATIENT)
Dept: INTERNAL MEDICINE | Facility: CLINIC | Age: 89
End: 2024-08-14
Payer: MEDICARE

## 2024-08-14 VITALS
TEMPERATURE: 97.6 F | HEART RATE: 80 BPM | HEIGHT: 56 IN | BODY MASS INDEX: 26.99 KG/M2 | WEIGHT: 120 LBS | OXYGEN SATURATION: 98 %

## 2024-08-14 VITALS — DIASTOLIC BLOOD PRESSURE: 78 MMHG | SYSTOLIC BLOOD PRESSURE: 128 MMHG

## 2024-08-14 DIAGNOSIS — N18.32 CHRONIC KIDNEY DISEASE, STAGE 3B: ICD-10-CM

## 2024-08-14 PROCEDURE — 36415 COLL VENOUS BLD VENIPUNCTURE: CPT

## 2024-08-14 PROCEDURE — 99213 OFFICE O/P EST LOW 20 MIN: CPT

## 2024-08-14 PROCEDURE — G2211 COMPLEX E/M VISIT ADD ON: CPT

## 2024-08-14 NOTE — HISTORY OF PRESENT ILLNESS
[de-identified] : Pt comes for left ear check  Was told by audiologist that she may have irritation in  left ear

## 2024-08-14 NOTE — REVIEW OF SYSTEMS
[Fever] : no fever [Chills] : no chills [Chest Pain] : no chest pain [Palpitations] : no palpitations [Lower Ext Edema] : no lower extremity edema [Wheezing] : no wheezing

## 2024-08-14 NOTE — PHYSICAL EXAM
[No Acute Distress] : no acute distress [Normal Sclera/Conjunctiva] : normal sclera/conjunctiva [Normal Outer Ear/Nose] : the outer ears and nose were normal in appearance [Normal Oropharynx] : the oropharynx was normal [No JVD] : no jugular venous distention [No Respiratory Distress] : no respiratory distress  [No Accessory Muscle Use] : no accessory muscle use [Clear to Auscultation] : lungs were clear to auscultation bilaterally [Normal Rate] : normal rate  [Regular Rhythm] : with a regular rhythm [No Edema] : there was no peripheral edema [No Extremity Clubbing/Cyanosis] : no extremity clubbing/cyanosis [Soft] : abdomen soft [Non Tender] : non-tender [Non-distended] : non-distended

## 2024-08-16 LAB
ALBUMIN SERPL ELPH-MCNC: 3.6 G/DL
ALP BLD-CCNC: 514 U/L
ALT SERPL-CCNC: 31 U/L
ANION GAP SERPL CALC-SCNC: 17 MMOL/L
AST SERPL-CCNC: 29 U/L
BILIRUB SERPL-MCNC: 0.7 MG/DL
BUN SERPL-MCNC: 37 MG/DL
CALCIUM SERPL-MCNC: 9.2 MG/DL
CHLORIDE SERPL-SCNC: 100 MMOL/L
CO2 SERPL-SCNC: 20 MMOL/L
CREAT SERPL-MCNC: 2.28 MG/DL
EGFR: 20 ML/MIN/1.73M2
GLUCOSE SERPL-MCNC: 280 MG/DL
POTASSIUM SERPL-SCNC: 5.3 MMOL/L
PROT SERPL-MCNC: 7.1 G/DL
SODIUM SERPL-SCNC: 137 MMOL/L

## 2024-09-13 ENCOUNTER — LABORATORY RESULT (OUTPATIENT)
Age: 89
End: 2024-09-13

## 2024-10-11 ENCOUNTER — APPOINTMENT (OUTPATIENT)
Dept: INTERNAL MEDICINE | Facility: CLINIC | Age: 89
End: 2024-10-11

## 2024-10-11 ENCOUNTER — APPOINTMENT (OUTPATIENT)
Dept: NEPHROLOGY | Facility: CLINIC | Age: 89
End: 2024-10-11
Payer: MEDICARE

## 2024-10-11 ENCOUNTER — NON-APPOINTMENT (OUTPATIENT)
Age: 89
End: 2024-10-11

## 2024-10-11 VITALS
HEART RATE: 61 BPM | OXYGEN SATURATION: 97 % | BODY MASS INDEX: 26.99 KG/M2 | DIASTOLIC BLOOD PRESSURE: 64 MMHG | WEIGHT: 120 LBS | HEIGHT: 56 IN | SYSTOLIC BLOOD PRESSURE: 144 MMHG | TEMPERATURE: 95.5 F

## 2024-10-11 VITALS — OXYGEN SATURATION: 97 % | WEIGHT: 120 LBS | BODY MASS INDEX: 26.99 KG/M2 | HEIGHT: 56 IN | HEART RATE: 71 BPM

## 2024-10-11 VITALS — DIASTOLIC BLOOD PRESSURE: 78 MMHG | SYSTOLIC BLOOD PRESSURE: 128 MMHG

## 2024-10-11 DIAGNOSIS — Z82.49 FAMILY HISTORY OF ISCHEMIC HEART DISEASE AND OTHER DISEASES OF THE CIRCULATORY SYSTEM: ICD-10-CM

## 2024-10-11 DIAGNOSIS — E78.5 HYPERLIPIDEMIA, UNSPECIFIED: ICD-10-CM

## 2024-10-11 DIAGNOSIS — R73.9 HYPERGLYCEMIA, UNSPECIFIED: ICD-10-CM

## 2024-10-11 DIAGNOSIS — N18.32 CHRONIC KIDNEY DISEASE, STAGE 3B: ICD-10-CM

## 2024-10-11 DIAGNOSIS — Z23 ENCOUNTER FOR IMMUNIZATION: ICD-10-CM

## 2024-10-11 DIAGNOSIS — R53.83 OTHER FATIGUE: ICD-10-CM

## 2024-10-11 DIAGNOSIS — R79.89 OTHER SPECIFIED ABNORMAL FINDINGS OF BLOOD CHEMISTRY: ICD-10-CM

## 2024-10-11 DIAGNOSIS — I10 ESSENTIAL (PRIMARY) HYPERTENSION: ICD-10-CM

## 2024-10-11 DIAGNOSIS — R80.9 PROTEINURIA, UNSPECIFIED: ICD-10-CM

## 2024-10-11 DIAGNOSIS — Z00.00 ENCOUNTER FOR GENERAL ADULT MEDICAL EXAMINATION W/OUT ABNORMAL FINDINGS: ICD-10-CM

## 2024-10-11 PROCEDURE — G0008: CPT

## 2024-10-11 PROCEDURE — 99214 OFFICE O/P EST MOD 30 MIN: CPT | Mod: 25

## 2024-10-11 PROCEDURE — 90662 IIV NO PRSV INCREASED AG IM: CPT

## 2024-10-11 PROCEDURE — 93000 ELECTROCARDIOGRAM COMPLETE: CPT

## 2024-10-11 PROCEDURE — G0439: CPT

## 2024-10-11 PROCEDURE — 99214 OFFICE O/P EST MOD 30 MIN: CPT

## 2024-10-11 PROCEDURE — 36415 COLL VENOUS BLD VENIPUNCTURE: CPT

## 2024-10-11 RX ORDER — DAPAGLIFLOZIN 5 MG/1
5 TABLET, FILM COATED ORAL DAILY
Qty: 90 | Refills: 3 | Status: ACTIVE | COMMUNITY
Start: 2024-10-11 | End: 1900-01-01

## 2024-10-14 ENCOUNTER — NON-APPOINTMENT (OUTPATIENT)
Age: 89
End: 2024-10-14

## 2024-10-14 LAB
25(OH)D3 SERPL-MCNC: 20.7 NG/ML
ALBUMIN SERPL ELPH-MCNC: 3.5 G/DL
ALP BLD-CCNC: 540 U/L
ALT SERPL-CCNC: 38 U/L
ANION GAP SERPL CALC-SCNC: 14 MMOL/L
APPEARANCE: ABNORMAL
AST SERPL-CCNC: 34 U/L
BACTERIA: ABNORMAL /HPF
BASOPHILS # BLD AUTO: 0.09 K/UL
BASOPHILS NFR BLD AUTO: 1.2 %
BILIRUB SERPL-MCNC: 0.5 MG/DL
BILIRUBIN URINE: NEGATIVE
BLOOD URINE: NEGATIVE
BUN SERPL-MCNC: 40 MG/DL
CALCIUM SERPL-MCNC: 9.2 MG/DL
CAST: 4 /LPF
CHLORIDE SERPL-SCNC: 103 MMOL/L
CHOLEST SERPL-MCNC: 174 MG/DL
CO2 SERPL-SCNC: 20 MMOL/L
COARSE GRANULAR CASTS: PRESENT
COLOR: YELLOW
CREAT SERPL-MCNC: 2.46 MG/DL
CREAT SPEC-SCNC: 91 MG/DL
EGFR: 18 ML/MIN/1.73M2
EOSINOPHIL # BLD AUTO: 0.69 K/UL
EOSINOPHIL NFR BLD AUTO: 9.4 %
EPITHELIAL CELLS: 13 /HPF
ESTIMATED AVERAGE GLUCOSE: 148 MG/DL
GLUCOSE QUALITATIVE U: 500 MG/DL
GLUCOSE SERPL-MCNC: 305 MG/DL
HBA1C MFR BLD HPLC: 6.8 %
HCT VFR BLD CALC: 46.4 %
HDLC SERPL-MCNC: 68 MG/DL
HGB BLD-MCNC: 14.4 G/DL
IMM GRANULOCYTES NFR BLD AUTO: 0.3 %
KETONES URINE: NEGATIVE MG/DL
LDLC SERPL CALC-MCNC: 76 MG/DL
LEUKOCYTE ESTERASE URINE: ABNORMAL
LYMPHOCYTES # BLD AUTO: 2.21 K/UL
LYMPHOCYTES NFR BLD AUTO: 30.2 %
MAN DIFF?: NORMAL
MCHC RBC-ENTMCNC: 29 PG
MCHC RBC-ENTMCNC: 31 GM/DL
MCV RBC AUTO: 93.4 FL
MICROALBUMIN 24H UR DL<=1MG/L-MCNC: 280.9 MG/DL
MICROALBUMIN/CREAT 24H UR-RTO: 3082 MG/G
MICROSCOPIC-UA: NORMAL
MONOCYTES # BLD AUTO: 0.55 K/UL
MONOCYTES NFR BLD AUTO: 7.5 %
NEUTROPHILS # BLD AUTO: 3.75 K/UL
NEUTROPHILS NFR BLD AUTO: 51.4 %
NITRITE URINE: NEGATIVE
NONHDLC SERPL-MCNC: 106 MG/DL
PH URINE: 5.5
PLATELET # BLD AUTO: 205 K/UL
POTASSIUM SERPL-SCNC: 4.8 MMOL/L
PROT SERPL-MCNC: 6.7 G/DL
PROTEIN URINE: 300 MG/DL
RBC # BLD: 4.97 M/UL
RBC # FLD: 15.6 %
RED BLOOD CELLS URINE: NORMAL /HPF
REVIEW: NORMAL
SODIUM SERPL-SCNC: 137 MMOL/L
SPECIFIC GRAVITY URINE: 1.02
TRIGL SERPL-MCNC: 175 MG/DL
TSH SERPL-ACNC: 1.38 UIU/ML
UROBILINOGEN URINE: 0.2 MG/DL
WBC # FLD AUTO: 7.31 K/UL
WHITE BLOOD CELLS URINE: 11 /HPF

## 2024-10-23 NOTE — ED PROVIDER NOTE - EYE, LEFT
Cbc,bmp within anesthesia guidelines, no follow-up required. Labs automatically routed to ordering provider via Epic documentation.    legally blind/CONJUNCTIVA ERYTHEMA

## 2024-11-04 ENCOUNTER — LABORATORY RESULT (OUTPATIENT)
Age: 89
End: 2024-11-04

## 2024-11-08 ENCOUNTER — APPOINTMENT (OUTPATIENT)
Dept: NEPHROLOGY | Facility: CLINIC | Age: 89
End: 2024-11-08

## 2024-11-08 VITALS
BODY MASS INDEX: 26.77 KG/M2 | HEART RATE: 66 BPM | OXYGEN SATURATION: 98 % | DIASTOLIC BLOOD PRESSURE: 70 MMHG | WEIGHT: 119 LBS | HEIGHT: 56 IN | SYSTOLIC BLOOD PRESSURE: 142 MMHG | TEMPERATURE: 96.6 F

## 2024-11-08 DIAGNOSIS — N18.32 CHRONIC KIDNEY DISEASE, STAGE 3B: ICD-10-CM

## 2024-11-08 PROCEDURE — 99214 OFFICE O/P EST MOD 30 MIN: CPT

## 2025-01-08 ENCOUNTER — APPOINTMENT (OUTPATIENT)
Dept: INTERNAL MEDICINE | Facility: CLINIC | Age: 89
End: 2025-01-08
Payer: MEDICARE

## 2025-01-08 VITALS — BODY MASS INDEX: 26.77 KG/M2 | HEART RATE: 74 BPM | WEIGHT: 119 LBS | OXYGEN SATURATION: 98 % | HEIGHT: 56 IN

## 2025-01-08 VITALS — DIASTOLIC BLOOD PRESSURE: 70 MMHG | SYSTOLIC BLOOD PRESSURE: 138 MMHG

## 2025-01-08 DIAGNOSIS — N18.32 CHRONIC KIDNEY DISEASE, STAGE 3B: ICD-10-CM

## 2025-01-08 DIAGNOSIS — R73.9 HYPERGLYCEMIA, UNSPECIFIED: ICD-10-CM

## 2025-01-08 DIAGNOSIS — I10 ESSENTIAL (PRIMARY) HYPERTENSION: ICD-10-CM

## 2025-01-08 DIAGNOSIS — E78.5 HYPERLIPIDEMIA, UNSPECIFIED: ICD-10-CM

## 2025-01-08 DIAGNOSIS — K21.9 GASTRO-ESOPHAGEAL REFLUX DISEASE W/OUT ESOPHAGITIS: ICD-10-CM

## 2025-01-08 PROCEDURE — G2211 COMPLEX E/M VISIT ADD ON: CPT

## 2025-01-08 PROCEDURE — 99214 OFFICE O/P EST MOD 30 MIN: CPT

## 2025-01-08 PROCEDURE — 36415 COLL VENOUS BLD VENIPUNCTURE: CPT

## 2025-01-09 LAB
ALBUMIN SERPL ELPH-MCNC: 3.4 G/DL
ALP BLD-CCNC: 490 U/L
ALT SERPL-CCNC: 24 U/L
ANION GAP SERPL CALC-SCNC: 14 MMOL/L
AST SERPL-CCNC: 26 U/L
BASOPHILS # BLD AUTO: 0.06 K/UL
BASOPHILS NFR BLD AUTO: 0.7 %
BILIRUB SERPL-MCNC: 0.6 MG/DL
BUN SERPL-MCNC: 51 MG/DL
CALCIUM SERPL-MCNC: 9.4 MG/DL
CHLORIDE SERPL-SCNC: 104 MMOL/L
CHOLEST SERPL-MCNC: 190 MG/DL
CO2 SERPL-SCNC: 20 MMOL/L
CREAT SERPL-MCNC: 2.98 MG/DL
EGFR: 14 ML/MIN/1.73M2
EOSINOPHIL # BLD AUTO: 0.58 K/UL
EOSINOPHIL NFR BLD AUTO: 7 %
ESTIMATED AVERAGE GLUCOSE: 146 MG/DL
GLUCOSE SERPL-MCNC: 215 MG/DL
HBA1C MFR BLD HPLC: 6.7 %
HCT VFR BLD CALC: 47.6 %
HDLC SERPL-MCNC: 70 MG/DL
HGB BLD-MCNC: 14.9 G/DL
IMM GRANULOCYTES NFR BLD AUTO: 0.2 %
LDLC SERPL CALC-MCNC: 94 MG/DL
LYMPHOCYTES # BLD AUTO: 2.24 K/UL
LYMPHOCYTES NFR BLD AUTO: 27.2 %
MAN DIFF?: NORMAL
MCHC RBC-ENTMCNC: 28.3 PG
MCHC RBC-ENTMCNC: 31.3 G/DL
MCV RBC AUTO: 90.3 FL
MONOCYTES # BLD AUTO: 0.77 K/UL
MONOCYTES NFR BLD AUTO: 9.3 %
NEUTROPHILS # BLD AUTO: 4.57 K/UL
NEUTROPHILS NFR BLD AUTO: 55.6 %
NONHDLC SERPL-MCNC: 120 MG/DL
PLATELET # BLD AUTO: 207 K/UL
POTASSIUM SERPL-SCNC: 5.1 MMOL/L
PROT SERPL-MCNC: 7 G/DL
RBC # BLD: 5.27 M/UL
RBC # FLD: 16.4 %
SODIUM SERPL-SCNC: 139 MMOL/L
TRIGL SERPL-MCNC: 155 MG/DL
TSH SERPL-ACNC: 1.3 UIU/ML
WBC # FLD AUTO: 8.24 K/UL

## 2025-02-24 ENCOUNTER — LABORATORY RESULT (OUTPATIENT)
Age: 89
End: 2025-02-24

## 2025-02-24 ENCOUNTER — APPOINTMENT (OUTPATIENT)
Dept: ULTRASOUND IMAGING | Facility: CLINIC | Age: 89
End: 2025-02-24
Payer: MEDICARE

## 2025-02-24 ENCOUNTER — OUTPATIENT (OUTPATIENT)
Dept: OUTPATIENT SERVICES | Facility: HOSPITAL | Age: 89
LOS: 1 days | End: 2025-02-24
Payer: MEDICARE

## 2025-02-24 DIAGNOSIS — N18.32 CHRONIC KIDNEY DISEASE, STAGE 3B: ICD-10-CM

## 2025-02-24 PROCEDURE — 76770 US EXAM ABDO BACK WALL COMP: CPT

## 2025-02-24 PROCEDURE — 76770 US EXAM ABDO BACK WALL COMP: CPT | Mod: 26

## 2025-02-26 ENCOUNTER — APPOINTMENT (OUTPATIENT)
Dept: NEPHROLOGY | Facility: CLINIC | Age: 89
End: 2025-02-26
Payer: MEDICARE

## 2025-02-26 VITALS
BODY MASS INDEX: 25.64 KG/M2 | HEIGHT: 56 IN | WEIGHT: 114 LBS | TEMPERATURE: 98.4 F | SYSTOLIC BLOOD PRESSURE: 138 MMHG | HEART RATE: 66 BPM | DIASTOLIC BLOOD PRESSURE: 72 MMHG | OXYGEN SATURATION: 97 %

## 2025-02-26 DIAGNOSIS — N18.32 CHRONIC KIDNEY DISEASE, STAGE 3B: ICD-10-CM

## 2025-02-26 PROCEDURE — 99214 OFFICE O/P EST MOD 30 MIN: CPT

## 2025-03-31 ENCOUNTER — LABORATORY RESULT (OUTPATIENT)
Age: 89
End: 2025-03-31

## 2025-04-07 ENCOUNTER — APPOINTMENT (OUTPATIENT)
Dept: INTERNAL MEDICINE | Facility: CLINIC | Age: 89
End: 2025-04-07
Payer: MEDICARE

## 2025-04-07 VITALS — SYSTOLIC BLOOD PRESSURE: 134 MMHG | DIASTOLIC BLOOD PRESSURE: 74 MMHG

## 2025-04-07 VITALS — WEIGHT: 111 LBS | HEART RATE: 78 BPM | BODY MASS INDEX: 24.97 KG/M2 | OXYGEN SATURATION: 96 % | HEIGHT: 56 IN

## 2025-04-07 DIAGNOSIS — R73.9 HYPERGLYCEMIA, UNSPECIFIED: ICD-10-CM

## 2025-04-07 DIAGNOSIS — N18.4 CHRONIC KIDNEY DISEASE, STAGE 4 (SEVERE): ICD-10-CM

## 2025-04-07 DIAGNOSIS — I10 ESSENTIAL (PRIMARY) HYPERTENSION: ICD-10-CM

## 2025-04-07 DIAGNOSIS — E78.5 HYPERLIPIDEMIA, UNSPECIFIED: ICD-10-CM

## 2025-04-07 PROCEDURE — G2211 COMPLEX E/M VISIT ADD ON: CPT

## 2025-04-07 PROCEDURE — 99214 OFFICE O/P EST MOD 30 MIN: CPT

## 2025-05-20 ENCOUNTER — LABORATORY RESULT (OUTPATIENT)
Age: 89
End: 2025-05-20

## 2025-06-02 ENCOUNTER — TRANSCRIPTION ENCOUNTER (OUTPATIENT)
Age: 89
End: 2025-06-02

## 2025-06-07 ENCOUNTER — NON-APPOINTMENT (OUTPATIENT)
Age: 89
End: 2025-06-07

## 2025-06-09 ENCOUNTER — APPOINTMENT (OUTPATIENT)
Dept: NEPHROLOGY | Facility: CLINIC | Age: 89
End: 2025-06-09

## 2025-06-09 ENCOUNTER — NON-APPOINTMENT (OUTPATIENT)
Age: 89
End: 2025-06-09

## 2025-06-09 VITALS
HEART RATE: 72 BPM | OXYGEN SATURATION: 98 % | WEIGHT: 109 LBS | BODY MASS INDEX: 24.52 KG/M2 | TEMPERATURE: 97.3 F | HEIGHT: 56 IN | DIASTOLIC BLOOD PRESSURE: 62 MMHG | SYSTOLIC BLOOD PRESSURE: 140 MMHG

## 2025-06-09 PROCEDURE — 99214 OFFICE O/P EST MOD 30 MIN: CPT

## 2025-06-10 LAB
APPEARANCE: ABNORMAL
BACTERIA: NEGATIVE /HPF
BILIRUBIN URINE: NEGATIVE
BLOOD URINE: NEGATIVE
CAST: 0 /LPF
COLOR: YELLOW
EPITHELIAL CELLS: 5 /HPF
GLUCOSE QUALITATIVE U: 500 MG/DL
KETONES URINE: NEGATIVE MG/DL
LEUKOCYTE ESTERASE URINE: NEGATIVE
MICROSCOPIC-UA: NORMAL
NITRITE URINE: NEGATIVE
PH URINE: 6
PROTEIN URINE: 300 MG/DL
RED BLOOD CELLS URINE: NORMAL /HPF
REVIEW: NORMAL
SPECIFIC GRAVITY URINE: 1.02
UROBILINOGEN URINE: 0.2 MG/DL
WHITE BLOOD CELLS URINE: 6 /HPF

## 2025-06-11 ENCOUNTER — NON-APPOINTMENT (OUTPATIENT)
Age: 89
End: 2025-06-11

## 2025-06-11 ENCOUNTER — TRANSCRIPTION ENCOUNTER (OUTPATIENT)
Age: 89
End: 2025-06-11

## 2025-06-12 ENCOUNTER — APPOINTMENT (OUTPATIENT)
Dept: RADIOLOGY | Facility: CLINIC | Age: 89
End: 2025-06-12
Payer: MEDICARE

## 2025-06-12 PROCEDURE — 71046 X-RAY EXAM CHEST 2 VIEWS: CPT

## 2025-06-16 LAB — BACTERIA UR CULT: ABNORMAL

## 2025-06-17 ENCOUNTER — LABORATORY RESULT (OUTPATIENT)
Age: 89
End: 2025-06-17

## 2025-06-17 ENCOUNTER — APPOINTMENT (OUTPATIENT)
Dept: INTERNAL MEDICINE | Facility: CLINIC | Age: 89
End: 2025-06-17
Payer: MEDICARE

## 2025-06-17 VITALS — WEIGHT: 108 LBS | OXYGEN SATURATION: 98 % | BODY MASS INDEX: 24.3 KG/M2 | HEIGHT: 56 IN | HEART RATE: 72 BPM

## 2025-06-17 VITALS — SYSTOLIC BLOOD PRESSURE: 134 MMHG | DIASTOLIC BLOOD PRESSURE: 68 MMHG

## 2025-06-17 PROCEDURE — G2211 COMPLEX E/M VISIT ADD ON: CPT

## 2025-06-17 PROCEDURE — 99214 OFFICE O/P EST MOD 30 MIN: CPT

## 2025-06-17 PROCEDURE — 36415 COLL VENOUS BLD VENIPUNCTURE: CPT

## 2025-06-18 ENCOUNTER — NON-APPOINTMENT (OUTPATIENT)
Age: 89
End: 2025-06-18

## 2025-06-18 LAB
ALBUMIN SERPL ELPH-MCNC: 3.3 G/DL
ALP BLD-CCNC: 856 U/L
ALT SERPL-CCNC: 40 U/L
ANION GAP SERPL CALC-SCNC: 13 MMOL/L
AST SERPL-CCNC: 34 U/L
BASOPHILS # BLD AUTO: 0.09 K/UL
BASOPHILS NFR BLD AUTO: 0.8 %
BILIRUB SERPL-MCNC: 0.5 MG/DL
BUN SERPL-MCNC: 49 MG/DL
CALCIUM SERPL-MCNC: 8.8 MG/DL
CHLORIDE SERPL-SCNC: 103 MMOL/L
CHOLEST SERPL-MCNC: 173 MG/DL
CO2 SERPL-SCNC: 18 MMOL/L
CREAT SERPL-MCNC: 3.13 MG/DL
EGFRCR SERPLBLD CKD-EPI 2021: 13 ML/MIN/1.73M2
EOSINOPHIL # BLD AUTO: 0.55 K/UL
EOSINOPHIL NFR BLD AUTO: 4.9 %
ESTIMATED AVERAGE GLUCOSE: 166 MG/DL
GLUCOSE SERPL-MCNC: 235 MG/DL
HBA1C MFR BLD HPLC: 7.4 %
HCT VFR BLD CALC: 42.6 %
HDLC SERPL-MCNC: 55 MG/DL
HGB BLD-MCNC: 13 G/DL
IMM GRANULOCYTES NFR BLD AUTO: 0.5 %
LDLC SERPL-MCNC: 82 MG/DL
LYMPHOCYTES # BLD AUTO: 2.45 K/UL
LYMPHOCYTES NFR BLD AUTO: 22 %
MAN DIFF?: NORMAL
MCHC RBC-ENTMCNC: 28.4 PG
MCHC RBC-ENTMCNC: 30.5 G/DL
MCV RBC AUTO: 93.2 FL
MONOCYTES # BLD AUTO: 0.78 K/UL
MONOCYTES NFR BLD AUTO: 7 %
NEUTROPHILS # BLD AUTO: 7.21 K/UL
NEUTROPHILS NFR BLD AUTO: 64.8 %
NONHDLC SERPL-MCNC: 118 MG/DL
PLATELET # BLD AUTO: 248 K/UL
POTASSIUM SERPL-SCNC: 5.5 MMOL/L
PROT SERPL-MCNC: 6.8 G/DL
RBC # BLD: 4.57 M/UL
RBC # FLD: 17.5 %
SODIUM SERPL-SCNC: 135 MMOL/L
TRIGL SERPL-MCNC: 218 MG/DL
TSH SERPL-ACNC: 0.99 UIU/ML
WBC # FLD AUTO: 11.14 K/UL

## 2025-06-23 LAB
ALBUMIN MFR SERPL ELPH: 40.4 %
ALBUMIN SERPL-MCNC: 2.8 G/DL
ALBUMIN/GLOB SERPL: 0.7 RATIO
ALPHA1 GLOB MFR SERPL ELPH: 7.2 %
ALPHA1 GLOB SERPL ELPH-MCNC: 0.5 G/DL
ALPHA2 GLOB MFR SERPL ELPH: 14.7 %
ALPHA2 GLOB SERPL ELPH-MCNC: 1 G/DL
B-GLOBULIN MFR SERPL ELPH: 11.9 %
B-GLOBULIN SERPL ELPH-MCNC: 0.8 G/DL
GAMMA GLOB FLD ELPH-MCNC: 1.8 G/DL
GAMMA GLOB MFR SERPL ELPH: 25.8 %
INTERPRETATION SERPL IEP-IMP: NORMAL
M PROTEIN MFR SERPL ELPH: 17.7 %
MONOCLON BAND OBS SERPL: 1.2 G/DL
PROT SERPL-MCNC: 6.9 G/DL
PROT SERPL-MCNC: 6.9 G/DL

## 2025-06-28 LAB
APPEARANCE: ABNORMAL
BACTERIA: ABNORMAL /HPF
BILIRUBIN URINE: NEGATIVE
BLOOD URINE: NEGATIVE
CAST: NORMAL /LPF
COLOR: YELLOW
EPITHELIAL CELLS: 7 /HPF
GLUCOSE QUALITATIVE U: 100 MG/DL
KETONES URINE: NEGATIVE MG/DL
LEUKOCYTE ESTERASE URINE: ABNORMAL
MICROSCOPIC-UA: NORMAL
NITRITE URINE: NEGATIVE
PH URINE: 5.5
PROTEIN URINE: 300 MG/DL
RED BLOOD CELLS URINE: NORMAL /HPF
REVIEW: NORMAL
SPECIFIC GRAVITY URINE: 1.02
UROBILINOGEN URINE: 0.2 MG/DL
WHITE BLOOD CELLS URINE: 14 /HPF

## 2025-07-09 LAB
APPEARANCE: ABNORMAL
BACTERIA: NEGATIVE /HPF
BILIRUBIN URINE: NEGATIVE
BLOOD URINE: NEGATIVE
CAST: 2 /LPF
COARSE GRANULAR CASTS: PRESENT
COLOR: YELLOW
EPITHELIAL CELLS: 5 /HPF
GLUCOSE QUALITATIVE U: 500 MG/DL
KETONES URINE: NEGATIVE MG/DL
LEUKOCYTE ESTERASE URINE: NEGATIVE
MICROSCOPIC-UA: NORMAL
NITRITE URINE: NEGATIVE
PH URINE: 5.5
PROTEIN URINE: 300 MG/DL
RED BLOOD CELLS URINE: NORMAL /HPF
REVIEW: NORMAL
SPECIFIC GRAVITY URINE: 1.02
UROBILINOGEN URINE: 0.2 MG/DL
WHITE BLOOD CELLS URINE: 11 /HPF

## 2025-07-10 ENCOUNTER — APPOINTMENT (OUTPATIENT)
Dept: ORTHOPEDIC SURGERY | Facility: CLINIC | Age: 89
End: 2025-07-10

## 2025-07-10 PROBLEM — M75.81 TENDINITIS OF BOTH ROTATOR CUFFS: Status: ACTIVE | Noted: 2025-07-10

## 2025-07-10 PROBLEM — M25.511 CHRONIC PAIN OF BOTH SHOULDERS: Status: RESOLVED | Noted: 2025-07-10 | Resolved: 2025-07-10

## 2025-07-10 PROBLEM — M25.519 SHOULDER PAIN, UNSPECIFIED CHRONICITY, UNSPECIFIED LATERALITY: Status: RESOLVED | Noted: 2025-07-10 | Resolved: 2025-07-10

## 2025-07-10 LAB — BACTERIA UR CULT: ABNORMAL

## 2025-07-10 PROCEDURE — 73030 X-RAY EXAM OF SHOULDER: CPT | Mod: 50

## 2025-07-10 PROCEDURE — 20610 DRAIN/INJ JOINT/BURSA W/O US: CPT | Mod: 50

## 2025-07-10 PROCEDURE — 99204 OFFICE O/P NEW MOD 45 MIN: CPT | Mod: 25

## 2025-07-10 RX ORDER — METHYLPRED ACET/NACL,ISO-OS/PF 40 MG/ML
40 VIAL (ML) INJECTION
Refills: 0 | Status: COMPLETED | OUTPATIENT
Start: 2025-07-10

## 2025-07-10 RX ORDER — LIDOCAINE HCL/PF 10 MG/ML
1 VIAL (ML) INJECTION
Refills: 0 | Status: COMPLETED | OUTPATIENT
Start: 2025-07-10

## 2025-07-10 RX ORDER — METHYLPREDNISOLONE 4 MG/1
4 TABLET ORAL
Qty: 1 | Refills: 0 | Status: ACTIVE | COMMUNITY
Start: 2025-07-10 | End: 1900-01-01

## 2025-07-10 RX ORDER — TETRACYCLINE HYDROCHLORIDE 500 MG/1
500 CAPSULE ORAL TWICE DAILY
Qty: 14 | Refills: 0 | Status: ACTIVE | COMMUNITY
Start: 2025-07-10 | End: 1900-01-01

## 2025-07-10 RX ADMIN — METHYLPREDNISOLONE ACETATE 2 MG/ML: 40 INJECTION, SUSPENSION INTRA-ARTICULAR; INTRALESIONAL; INTRAMUSCULAR; SOFT TISSUE at 00:00

## 2025-07-10 RX ADMIN — LIDOCAINE HYDROCHLORIDE 4 %: 10 INJECTION, SOLUTION INFILTRATION; PERINEURAL at 00:00

## 2025-07-16 ENCOUNTER — INPATIENT (INPATIENT)
Facility: HOSPITAL | Age: 89
LOS: 6 days | Discharge: NURSING, REHAB OR LONG-TERM CARE | DRG: 440 | End: 2025-07-23
Attending: INTERNAL MEDICINE | Admitting: HOSPITALIST
Payer: MEDICARE

## 2025-07-16 VITALS
DIASTOLIC BLOOD PRESSURE: 74 MMHG | HEIGHT: 56 IN | SYSTOLIC BLOOD PRESSURE: 193 MMHG | HEART RATE: 65 BPM | WEIGHT: 108.03 LBS | RESPIRATION RATE: 17 BRPM | TEMPERATURE: 98 F | OXYGEN SATURATION: 98 %

## 2025-07-16 DIAGNOSIS — K85.90 ACUTE PANCREATITIS WITHOUT NECROSIS OR INFECTION, UNSPECIFIED: ICD-10-CM

## 2025-07-16 LAB
ALBUMIN SERPL ELPH-MCNC: 2.1 G/DL — LOW (ref 3.3–5)
ALP SERPL-CCNC: 510 U/L — HIGH (ref 30–120)
ALT FLD-CCNC: 45 U/L — SIGNIFICANT CHANGE UP (ref 10–60)
ANION GAP SERPL CALC-SCNC: 12 MMOL/L — SIGNIFICANT CHANGE UP (ref 5–17)
APPEARANCE UR: CLEAR — SIGNIFICANT CHANGE UP
AST SERPL-CCNC: 36 U/L — SIGNIFICANT CHANGE UP (ref 10–40)
BACTERIA # UR AUTO: NEGATIVE /HPF — SIGNIFICANT CHANGE UP
BASOPHILS # BLD AUTO: 0.03 K/UL — SIGNIFICANT CHANGE UP (ref 0–0.2)
BASOPHILS NFR BLD AUTO: 0.2 % — SIGNIFICANT CHANGE UP (ref 0–2)
BILIRUB SERPL-MCNC: 0.9 MG/DL — SIGNIFICANT CHANGE UP (ref 0.2–1.2)
BILIRUB UR-MCNC: NEGATIVE — SIGNIFICANT CHANGE UP
BUN SERPL-MCNC: 126 MG/DL — HIGH (ref 7–23)
CALCIUM SERPL-MCNC: 8.9 MG/DL — SIGNIFICANT CHANGE UP (ref 8.4–10.5)
CHLORIDE SERPL-SCNC: 105 MMOL/L — SIGNIFICANT CHANGE UP (ref 96–108)
CO2 SERPL-SCNC: 20 MMOL/L — LOW (ref 22–31)
COLOR SPEC: YELLOW — SIGNIFICANT CHANGE UP
CREAT SERPL-MCNC: 4.27 MG/DL — HIGH (ref 0.5–1.3)
DIFF PNL FLD: NEGATIVE — SIGNIFICANT CHANGE UP
EGFR: 9 ML/MIN/1.73M2 — LOW
EGFR: 9 ML/MIN/1.73M2 — LOW
EOSINOPHIL # BLD AUTO: 0.16 K/UL — SIGNIFICANT CHANGE UP (ref 0–0.5)
EOSINOPHIL NFR BLD AUTO: 1.2 % — SIGNIFICANT CHANGE UP (ref 0–6)
GLUCOSE SERPL-MCNC: 272 MG/DL — HIGH (ref 70–99)
GLUCOSE UR QL: 250 MG/DL
HCT VFR BLD CALC: 41.9 % — SIGNIFICANT CHANGE UP (ref 34.5–45)
HGB BLD-MCNC: 13.6 G/DL — SIGNIFICANT CHANGE UP (ref 11.5–15.5)
IMM GRANULOCYTES # BLD AUTO: 0.21 K/UL — HIGH (ref 0–0.07)
IMM GRANULOCYTES NFR BLD AUTO: 1.5 % — HIGH (ref 0–0.9)
KETONES UR QL: NEGATIVE MG/DL — SIGNIFICANT CHANGE UP
LEUKOCYTE ESTERASE UR-ACNC: NEGATIVE — SIGNIFICANT CHANGE UP
LIDOCAIN IGE QN: 330 U/L — HIGH (ref 16–77)
LYMPHOCYTES # BLD AUTO: 2.33 K/UL — SIGNIFICANT CHANGE UP (ref 1–3.3)
LYMPHOCYTES NFR BLD AUTO: 17.2 % — SIGNIFICANT CHANGE UP (ref 13–44)
MCHC RBC-ENTMCNC: 28.5 PG — SIGNIFICANT CHANGE UP (ref 27–34)
MCHC RBC-ENTMCNC: 32.5 G/DL — SIGNIFICANT CHANGE UP (ref 32–36)
MCV RBC AUTO: 87.7 FL — SIGNIFICANT CHANGE UP (ref 80–100)
MONOCYTES # BLD AUTO: 0.86 K/UL — SIGNIFICANT CHANGE UP (ref 0–0.9)
MONOCYTES NFR BLD AUTO: 6.3 % — SIGNIFICANT CHANGE UP (ref 2–14)
NEUTROPHILS # BLD AUTO: 9.96 K/UL — HIGH (ref 1.8–7.4)
NEUTROPHILS NFR BLD AUTO: 73.6 % — SIGNIFICANT CHANGE UP (ref 43–77)
NITRITE UR-MCNC: NEGATIVE — SIGNIFICANT CHANGE UP
NRBC # BLD AUTO: 0 K/UL — SIGNIFICANT CHANGE UP (ref 0–0)
NRBC # FLD: 0 K/UL — SIGNIFICANT CHANGE UP (ref 0–0)
NRBC BLD AUTO-RTO: 0 /100 WBCS — SIGNIFICANT CHANGE UP (ref 0–0)
PH UR: 5 — SIGNIFICANT CHANGE UP (ref 5–8)
PLATELET # BLD AUTO: 272 K/UL — SIGNIFICANT CHANGE UP (ref 150–400)
PMV BLD: 9.9 FL — SIGNIFICANT CHANGE UP (ref 7–13)
POTASSIUM SERPL-MCNC: 5.4 MMOL/L — HIGH (ref 3.5–5.3)
POTASSIUM SERPL-SCNC: 5.4 MMOL/L — HIGH (ref 3.5–5.3)
PROT SERPL-MCNC: 6.6 G/DL — SIGNIFICANT CHANGE UP (ref 6–8.3)
PROT UR-MCNC: 300 MG/DL
RBC # BLD: 4.78 M/UL — SIGNIFICANT CHANGE UP (ref 3.8–5.2)
RBC # FLD: 16.8 % — HIGH (ref 10.3–14.5)
RBC CASTS # UR COMP ASSIST: 0 /HPF — SIGNIFICANT CHANGE UP (ref 0–4)
SODIUM SERPL-SCNC: 137 MMOL/L — SIGNIFICANT CHANGE UP (ref 135–145)
SP GR SPEC: 1.01 — SIGNIFICANT CHANGE UP (ref 1–1.03)
TROPONIN I, HIGH SENSITIVITY RESULT: 19.7 NG/L — SIGNIFICANT CHANGE UP
UROBILINOGEN FLD QL: 0.2 MG/DL — SIGNIFICANT CHANGE UP (ref 0.2–1)
WBC # BLD: 13.55 K/UL — HIGH (ref 3.8–10.5)
WBC # FLD AUTO: 13.55 K/UL — HIGH (ref 3.8–10.5)
WBC UR QL: 0 /HPF — SIGNIFICANT CHANGE UP (ref 0–5)

## 2025-07-16 PROCEDURE — 99285 EMERGENCY DEPT VISIT HI MDM: CPT

## 2025-07-16 PROCEDURE — 81001 URINALYSIS AUTO W/SCOPE: CPT

## 2025-07-16 PROCEDURE — 71045 X-RAY EXAM CHEST 1 VIEW: CPT

## 2025-07-16 PROCEDURE — 80053 COMPREHEN METABOLIC PANEL: CPT

## 2025-07-16 PROCEDURE — 83690 ASSAY OF LIPASE: CPT

## 2025-07-16 PROCEDURE — 74176 CT ABD & PELVIS W/O CONTRAST: CPT

## 2025-07-16 PROCEDURE — 85025 COMPLETE CBC W/AUTO DIFF WBC: CPT

## 2025-07-16 PROCEDURE — 93010 ELECTROCARDIOGRAM REPORT: CPT

## 2025-07-16 PROCEDURE — 84484 ASSAY OF TROPONIN QUANT: CPT

## 2025-07-16 PROCEDURE — 74176 CT ABD & PELVIS W/O CONTRAST: CPT | Mod: 26

## 2025-07-16 PROCEDURE — 71045 X-RAY EXAM CHEST 1 VIEW: CPT | Mod: 26

## 2025-07-16 PROCEDURE — 36415 COLL VENOUS BLD VENIPUNCTURE: CPT

## 2025-07-16 PROCEDURE — 99223 1ST HOSP IP/OBS HIGH 75: CPT

## 2025-07-16 RX ORDER — HYDROMORPHONE/SOD CHLOR,ISO/PF 2 MG/10 ML
0.5 SYRINGE (ML) INJECTION ONCE
Refills: 0 | Status: DISCONTINUED | OUTPATIENT
Start: 2025-07-16 | End: 2025-07-16

## 2025-07-16 RX ORDER — MELATONIN 5 MG
3 TABLET ORAL AT BEDTIME
Refills: 0 | Status: DISCONTINUED | OUTPATIENT
Start: 2025-07-16 | End: 2025-07-23

## 2025-07-16 RX ORDER — HEPARIN SODIUM 1000 [USP'U]/ML
5000 INJECTION INTRAVENOUS; SUBCUTANEOUS EVERY 8 HOURS
Refills: 0 | Status: DISCONTINUED | OUTPATIENT
Start: 2025-07-16 | End: 2025-07-23

## 2025-07-16 RX ORDER — ACETAMINOPHEN 500 MG/5ML
650 LIQUID (ML) ORAL EVERY 6 HOURS
Refills: 0 | Status: DISCONTINUED | OUTPATIENT
Start: 2025-07-16 | End: 2025-07-23

## 2025-07-16 RX ORDER — ONDANSETRON HCL/PF 4 MG/2 ML
4 VIAL (ML) INJECTION ONCE
Refills: 0 | Status: COMPLETED | OUTPATIENT
Start: 2025-07-16 | End: 2025-07-16

## 2025-07-16 RX ORDER — SODIUM ZIRCONIUM CYCLOSILICATE 5 G/5G
5 POWDER, FOR SUSPENSION ORAL ONCE
Refills: 0 | Status: COMPLETED | OUTPATIENT
Start: 2025-07-16 | End: 2025-07-16

## 2025-07-16 RX ORDER — METOPROLOL SUCCINATE 50 MG/1
25 TABLET, EXTENDED RELEASE ORAL DAILY
Refills: 0 | Status: DISCONTINUED | OUTPATIENT
Start: 2025-07-16 | End: 2025-07-23

## 2025-07-16 RX ORDER — MAGNESIUM, ALUMINUM HYDROXIDE 200-200 MG
30 TABLET,CHEWABLE ORAL EVERY 4 HOURS
Refills: 0 | Status: DISCONTINUED | OUTPATIENT
Start: 2025-07-16 | End: 2025-07-23

## 2025-07-16 RX ORDER — AMLODIPINE BESYLATE 10 MG/1
10 TABLET ORAL DAILY
Refills: 0 | Status: DISCONTINUED | OUTPATIENT
Start: 2025-07-16 | End: 2025-07-23

## 2025-07-16 RX ORDER — ONDANSETRON HCL/PF 4 MG/2 ML
4 VIAL (ML) INJECTION EVERY 8 HOURS
Refills: 0 | Status: DISCONTINUED | OUTPATIENT
Start: 2025-07-16 | End: 2025-07-23

## 2025-07-16 RX ORDER — ACETAMINOPHEN 500 MG/5ML
725 LIQUID (ML) ORAL ONCE
Refills: 0 | Status: COMPLETED | OUTPATIENT
Start: 2025-07-16 | End: 2025-07-16

## 2025-07-16 RX ADMIN — HEPARIN SODIUM 5000 UNIT(S): 1000 INJECTION INTRAVENOUS; SUBCUTANEOUS at 21:57

## 2025-07-16 RX ADMIN — Medication 1000 MILLILITER(S): at 11:40

## 2025-07-16 RX ADMIN — Medication 1000 MILLILITER(S): at 14:00

## 2025-07-16 RX ADMIN — Medication 725 MILLIGRAM(S): at 13:00

## 2025-07-16 RX ADMIN — SODIUM ZIRCONIUM CYCLOSILICATE 5 GRAM(S): 5 POWDER, FOR SUSPENSION ORAL at 18:08

## 2025-07-16 RX ADMIN — Medication 30 MILLILITER(S): at 21:59

## 2025-07-16 RX ADMIN — Medication 3 MILLIGRAM(S): at 21:59

## 2025-07-16 RX ADMIN — Medication 725 MILLIGRAM(S): at 12:42

## 2025-07-16 RX ADMIN — Medication 4 MILLIGRAM(S): at 11:40

## 2025-07-16 RX ADMIN — Medication 0.5 MILLIGRAM(S): at 15:02

## 2025-07-16 RX ADMIN — Medication 290 MILLIGRAM(S): at 12:22

## 2025-07-16 RX ADMIN — Medication 100 MILLILITER(S): at 17:01

## 2025-07-17 LAB
ALBUMIN SERPL ELPH-MCNC: 2.1 G/DL — LOW (ref 3.3–5)
ALP SERPL-CCNC: 441 U/L — HIGH (ref 30–120)
ALT FLD-CCNC: 39 U/L — SIGNIFICANT CHANGE UP (ref 10–60)
ANION GAP SERPL CALC-SCNC: 14 MMOL/L — SIGNIFICANT CHANGE UP (ref 5–17)
AST SERPL-CCNC: 31 U/L — SIGNIFICANT CHANGE UP (ref 10–40)
BILIRUB SERPL-MCNC: 0.8 MG/DL — SIGNIFICANT CHANGE UP (ref 0.2–1.2)
BUN SERPL-MCNC: 103 MG/DL — HIGH (ref 7–23)
CALCIUM SERPL-MCNC: 8 MG/DL — LOW (ref 8.4–10.5)
CHLORIDE SERPL-SCNC: 110 MMOL/L — HIGH (ref 96–108)
CO2 SERPL-SCNC: 16 MMOL/L — LOW (ref 22–31)
CREAT SERPL-MCNC: 3.78 MG/DL — HIGH (ref 0.5–1.3)
EGFR: 11 ML/MIN/1.73M2 — LOW
EGFR: 11 ML/MIN/1.73M2 — LOW
GLUCOSE SERPL-MCNC: 108 MG/DL — HIGH (ref 70–99)
HCT VFR BLD CALC: 43.6 % — SIGNIFICANT CHANGE UP (ref 34.5–45)
HGB BLD-MCNC: 13.8 G/DL — SIGNIFICANT CHANGE UP (ref 11.5–15.5)
LIDOCAIN IGE QN: >375 U/L — HIGH (ref 16–77)
MCHC RBC-ENTMCNC: 28.4 PG — SIGNIFICANT CHANGE UP (ref 27–34)
MCHC RBC-ENTMCNC: 31.7 G/DL — LOW (ref 32–36)
MCV RBC AUTO: 89.7 FL — SIGNIFICANT CHANGE UP (ref 80–100)
NRBC # BLD AUTO: 0 K/UL — SIGNIFICANT CHANGE UP (ref 0–0)
NRBC # FLD: 0 K/UL — SIGNIFICANT CHANGE UP (ref 0–0)
NRBC BLD AUTO-RTO: 0 /100 WBCS — SIGNIFICANT CHANGE UP (ref 0–0)
PLATELET # BLD AUTO: 258 K/UL — SIGNIFICANT CHANGE UP (ref 150–400)
PMV BLD: 9.5 FL — SIGNIFICANT CHANGE UP (ref 7–13)
POTASSIUM SERPL-MCNC: 5.4 MMOL/L — HIGH (ref 3.5–5.3)
POTASSIUM SERPL-SCNC: 5.4 MMOL/L — HIGH (ref 3.5–5.3)
PROT SERPL-MCNC: 5.6 G/DL — LOW (ref 6–8.3)
RBC # BLD: 4.86 M/UL — SIGNIFICANT CHANGE UP (ref 3.8–5.2)
RBC # FLD: 17.2 % — HIGH (ref 10.3–14.5)
SODIUM SERPL-SCNC: 140 MMOL/L — SIGNIFICANT CHANGE UP (ref 135–145)
WBC # BLD: 15.52 K/UL — HIGH (ref 3.8–10.5)
WBC # FLD AUTO: 15.52 K/UL — HIGH (ref 3.8–10.5)

## 2025-07-17 PROCEDURE — 85025 COMPLETE CBC W/AUTO DIFF WBC: CPT

## 2025-07-17 PROCEDURE — 81001 URINALYSIS AUTO W/SCOPE: CPT

## 2025-07-17 PROCEDURE — 71045 X-RAY EXAM CHEST 1 VIEW: CPT

## 2025-07-17 PROCEDURE — 85027 COMPLETE CBC AUTOMATED: CPT

## 2025-07-17 PROCEDURE — 99233 SBSQ HOSP IP/OBS HIGH 50: CPT

## 2025-07-17 PROCEDURE — 84484 ASSAY OF TROPONIN QUANT: CPT

## 2025-07-17 PROCEDURE — 93005 ELECTROCARDIOGRAM TRACING: CPT

## 2025-07-17 PROCEDURE — 36415 COLL VENOUS BLD VENIPUNCTURE: CPT

## 2025-07-17 PROCEDURE — 80053 COMPREHEN METABOLIC PANEL: CPT

## 2025-07-17 PROCEDURE — 83690 ASSAY OF LIPASE: CPT

## 2025-07-17 PROCEDURE — 74176 CT ABD & PELVIS W/O CONTRAST: CPT

## 2025-07-17 RX ORDER — SIMETHICONE 80 MG
80 TABLET,CHEWABLE ORAL
Refills: 0 | Status: DISCONTINUED | OUTPATIENT
Start: 2025-07-17 | End: 2025-07-23

## 2025-07-17 RX ORDER — SUCRALFATE 1 G
1 TABLET ORAL EVERY 6 HOURS
Refills: 0 | Status: DISCONTINUED | OUTPATIENT
Start: 2025-07-17 | End: 2025-07-23

## 2025-07-17 RX ORDER — SUCRALFATE 1 G
1 TABLET ORAL
Refills: 0 | Status: DISCONTINUED | OUTPATIENT
Start: 2025-07-17 | End: 2025-07-17

## 2025-07-17 RX ORDER — HYDROMORPHONE/SOD CHLOR,ISO/PF 2 MG/10 ML
0.5 SYRINGE (ML) INJECTION ONCE
Refills: 0 | Status: DISCONTINUED | OUTPATIENT
Start: 2025-07-17 | End: 2025-07-17

## 2025-07-17 RX ADMIN — HEPARIN SODIUM 5000 UNIT(S): 1000 INJECTION INTRAVENOUS; SUBCUTANEOUS at 14:06

## 2025-07-17 RX ADMIN — Medication 3 MILLIGRAM(S): at 21:15

## 2025-07-17 RX ADMIN — Medication 40 MILLIGRAM(S): at 18:47

## 2025-07-17 RX ADMIN — Medication 1 GRAM(S): at 12:57

## 2025-07-17 RX ADMIN — HEPARIN SODIUM 5000 UNIT(S): 1000 INJECTION INTRAVENOUS; SUBCUTANEOUS at 06:09

## 2025-07-17 RX ADMIN — Medication 2 MILLIGRAM(S): at 14:07

## 2025-07-17 RX ADMIN — AMLODIPINE BESYLATE 10 MILLIGRAM(S): 10 TABLET ORAL at 06:09

## 2025-07-17 RX ADMIN — Medication 0.5 MILLIGRAM(S): at 02:18

## 2025-07-17 RX ADMIN — Medication 0.5 MILLIGRAM(S): at 02:45

## 2025-07-17 RX ADMIN — Medication 1 GRAM(S): at 18:46

## 2025-07-17 RX ADMIN — Medication 80 MILLIGRAM(S): at 21:15

## 2025-07-17 RX ADMIN — Medication 1 GRAM(S): at 23:57

## 2025-07-17 RX ADMIN — HEPARIN SODIUM 5000 UNIT(S): 1000 INJECTION INTRAVENOUS; SUBCUTANEOUS at 21:15

## 2025-07-17 RX ADMIN — Medication 650 MILLIGRAM(S): at 09:59

## 2025-07-17 RX ADMIN — Medication 2 MILLIGRAM(S): at 23:52

## 2025-07-17 RX ADMIN — METOPROLOL SUCCINATE 25 MILLIGRAM(S): 50 TABLET, EXTENDED RELEASE ORAL at 06:09

## 2025-07-17 RX ADMIN — Medication 40 MILLIGRAM(S): at 06:09

## 2025-07-18 ENCOUNTER — TRANSCRIPTION ENCOUNTER (OUTPATIENT)
Age: 89
End: 2025-07-18

## 2025-07-18 LAB
ALBUMIN SERPL ELPH-MCNC: 1.9 G/DL — LOW (ref 3.3–5)
ALP SERPL-CCNC: 384 U/L — HIGH (ref 30–120)
ALT FLD-CCNC: 31 U/L — SIGNIFICANT CHANGE UP (ref 10–60)
ANION GAP SERPL CALC-SCNC: 14 MMOL/L — SIGNIFICANT CHANGE UP (ref 5–17)
AST SERPL-CCNC: 34 U/L — SIGNIFICANT CHANGE UP (ref 10–40)
BILIRUB SERPL-MCNC: 0.8 MG/DL — SIGNIFICANT CHANGE UP (ref 0.2–1.2)
BUN SERPL-MCNC: 101 MG/DL — HIGH (ref 7–23)
CALCIUM SERPL-MCNC: 8.3 MG/DL — LOW (ref 8.4–10.5)
CHLORIDE SERPL-SCNC: 112 MMOL/L — HIGH (ref 96–108)
CO2 SERPL-SCNC: 16 MMOL/L — LOW (ref 22–31)
CREAT SERPL-MCNC: 3.73 MG/DL — HIGH (ref 0.5–1.3)
EGFR: 11 ML/MIN/1.73M2 — LOW
EGFR: 11 ML/MIN/1.73M2 — LOW
GLUCOSE SERPL-MCNC: 91 MG/DL — SIGNIFICANT CHANGE UP (ref 70–99)
HCT VFR BLD CALC: 43.6 % — SIGNIFICANT CHANGE UP (ref 34.5–45)
HGB BLD-MCNC: 13.7 G/DL — SIGNIFICANT CHANGE UP (ref 11.5–15.5)
LIDOCAIN IGE QN: >375 U/L — HIGH (ref 16–77)
MAGNESIUM SERPL-MCNC: 2.2 MG/DL — SIGNIFICANT CHANGE UP (ref 1.6–2.6)
MCHC RBC-ENTMCNC: 28.1 PG — SIGNIFICANT CHANGE UP (ref 27–34)
MCHC RBC-ENTMCNC: 31.4 G/DL — LOW (ref 32–36)
MCV RBC AUTO: 89.5 FL — SIGNIFICANT CHANGE UP (ref 80–100)
NRBC # BLD AUTO: 0 K/UL — SIGNIFICANT CHANGE UP (ref 0–0)
NRBC # FLD: 0 K/UL — SIGNIFICANT CHANGE UP (ref 0–0)
NRBC BLD AUTO-RTO: 0 /100 WBCS — SIGNIFICANT CHANGE UP (ref 0–0)
PHOSPHATE SERPL-MCNC: 7.5 MG/DL — HIGH (ref 2.5–4.5)
PLATELET # BLD AUTO: 257 K/UL — SIGNIFICANT CHANGE UP (ref 150–400)
PMV BLD: 9.9 FL — SIGNIFICANT CHANGE UP (ref 7–13)
POTASSIUM SERPL-MCNC: 5.4 MMOL/L — HIGH (ref 3.5–5.3)
POTASSIUM SERPL-SCNC: 5.4 MMOL/L — HIGH (ref 3.5–5.3)
PROT SERPL-MCNC: 6 G/DL — SIGNIFICANT CHANGE UP (ref 6–8.3)
RBC # BLD: 4.87 M/UL — SIGNIFICANT CHANGE UP (ref 3.8–5.2)
RBC # FLD: 17.3 % — HIGH (ref 10.3–14.5)
SODIUM SERPL-SCNC: 142 MMOL/L — SIGNIFICANT CHANGE UP (ref 135–145)
WBC # BLD: 18.78 K/UL — HIGH (ref 3.8–10.5)
WBC # FLD AUTO: 18.78 K/UL — HIGH (ref 3.8–10.5)

## 2025-07-18 PROCEDURE — 83690 ASSAY OF LIPASE: CPT

## 2025-07-18 PROCEDURE — 93005 ELECTROCARDIOGRAM TRACING: CPT

## 2025-07-18 PROCEDURE — 84100 ASSAY OF PHOSPHORUS: CPT

## 2025-07-18 PROCEDURE — 71045 X-RAY EXAM CHEST 1 VIEW: CPT

## 2025-07-18 PROCEDURE — 36415 COLL VENOUS BLD VENIPUNCTURE: CPT

## 2025-07-18 PROCEDURE — 97161 PT EVAL LOW COMPLEX 20 MIN: CPT

## 2025-07-18 PROCEDURE — 83735 ASSAY OF MAGNESIUM: CPT

## 2025-07-18 PROCEDURE — 85027 COMPLETE CBC AUTOMATED: CPT

## 2025-07-18 PROCEDURE — 76700 US EXAM ABDOM COMPLETE: CPT

## 2025-07-18 PROCEDURE — 99233 SBSQ HOSP IP/OBS HIGH 50: CPT

## 2025-07-18 PROCEDURE — 81001 URINALYSIS AUTO W/SCOPE: CPT

## 2025-07-18 PROCEDURE — 76700 US EXAM ABDOM COMPLETE: CPT | Mod: 26

## 2025-07-18 PROCEDURE — 80053 COMPREHEN METABOLIC PANEL: CPT

## 2025-07-18 PROCEDURE — 84484 ASSAY OF TROPONIN QUANT: CPT

## 2025-07-18 PROCEDURE — 85025 COMPLETE CBC W/AUTO DIFF WBC: CPT

## 2025-07-18 PROCEDURE — 74176 CT ABD & PELVIS W/O CONTRAST: CPT

## 2025-07-18 RX ORDER — HYDROMORPHONE/SOD CHLOR,ISO/PF 2 MG/10 ML
0.2 SYRINGE (ML) INJECTION EVERY 4 HOURS
Refills: 0 | Status: DISCONTINUED | OUTPATIENT
Start: 2025-07-18 | End: 2025-07-23

## 2025-07-18 RX ORDER — SODIUM CHLORIDE 9 G/1000ML
1000 INJECTION, SOLUTION INTRAVENOUS
Refills: 0 | Status: DISCONTINUED | OUTPATIENT
Start: 2025-07-18 | End: 2025-07-22

## 2025-07-18 RX ADMIN — Medication 80 MILLIGRAM(S): at 21:15

## 2025-07-18 RX ADMIN — Medication 40 MILLIGRAM(S): at 05:36

## 2025-07-18 RX ADMIN — Medication 2 MILLIGRAM(S): at 08:30

## 2025-07-18 RX ADMIN — Medication 40 MILLIGRAM(S): at 17:45

## 2025-07-18 RX ADMIN — METOPROLOL SUCCINATE 25 MILLIGRAM(S): 50 TABLET, EXTENDED RELEASE ORAL at 05:35

## 2025-07-18 RX ADMIN — HEPARIN SODIUM 5000 UNIT(S): 1000 INJECTION INTRAVENOUS; SUBCUTANEOUS at 05:36

## 2025-07-18 RX ADMIN — Medication 2 MILLIGRAM(S): at 00:15

## 2025-07-18 RX ADMIN — AMLODIPINE BESYLATE 10 MILLIGRAM(S): 10 TABLET ORAL at 05:36

## 2025-07-18 RX ADMIN — HEPARIN SODIUM 5000 UNIT(S): 1000 INJECTION INTRAVENOUS; SUBCUTANEOUS at 21:15

## 2025-07-18 RX ADMIN — Medication 0.2 MILLIGRAM(S): at 13:46

## 2025-07-18 RX ADMIN — Medication 1 GRAM(S): at 21:15

## 2025-07-18 RX ADMIN — Medication 1 GRAM(S): at 05:36

## 2025-07-18 RX ADMIN — HEPARIN SODIUM 5000 UNIT(S): 1000 INJECTION INTRAVENOUS; SUBCUTANEOUS at 15:22

## 2025-07-18 RX ADMIN — Medication 4 MILLIGRAM(S): at 17:46

## 2025-07-18 RX ADMIN — Medication 2 MILLIGRAM(S): at 08:45

## 2025-07-18 RX ADMIN — Medication 0.2 MILLIGRAM(S): at 14:01

## 2025-07-19 LAB
ALBUMIN SERPL ELPH-MCNC: 1.9 G/DL — LOW (ref 3.3–5)
ALP SERPL-CCNC: 329 U/L — HIGH (ref 30–120)
ALT FLD-CCNC: 32 U/L — SIGNIFICANT CHANGE UP (ref 10–60)
ANION GAP SERPL CALC-SCNC: 13 MMOL/L — SIGNIFICANT CHANGE UP (ref 5–17)
AST SERPL-CCNC: 26 U/L — SIGNIFICANT CHANGE UP (ref 10–40)
BILIRUB SERPL-MCNC: 0.8 MG/DL — SIGNIFICANT CHANGE UP (ref 0.2–1.2)
BUN SERPL-MCNC: 100 MG/DL — HIGH (ref 7–23)
CALCIUM SERPL-MCNC: 8.2 MG/DL — LOW (ref 8.4–10.5)
CHLORIDE SERPL-SCNC: 111 MMOL/L — HIGH (ref 96–108)
CO2 SERPL-SCNC: 17 MMOL/L — LOW (ref 22–31)
CREAT SERPL-MCNC: 3.57 MG/DL — HIGH (ref 0.5–1.3)
EGFR: 11 ML/MIN/1.73M2 — LOW
EGFR: 11 ML/MIN/1.73M2 — LOW
GLUCOSE SERPL-MCNC: 119 MG/DL — HIGH (ref 70–99)
HCT VFR BLD CALC: 41.3 % — SIGNIFICANT CHANGE UP (ref 34.5–45)
HGB BLD-MCNC: 13.2 G/DL — SIGNIFICANT CHANGE UP (ref 11.5–15.5)
MCHC RBC-ENTMCNC: 28 PG — SIGNIFICANT CHANGE UP (ref 27–34)
MCHC RBC-ENTMCNC: 32 G/DL — SIGNIFICANT CHANGE UP (ref 32–36)
MCV RBC AUTO: 87.5 FL — SIGNIFICANT CHANGE UP (ref 80–100)
NRBC # BLD AUTO: 0 K/UL — SIGNIFICANT CHANGE UP (ref 0–0)
NRBC # FLD: 0 K/UL — SIGNIFICANT CHANGE UP (ref 0–0)
NRBC BLD AUTO-RTO: 0 /100 WBCS — SIGNIFICANT CHANGE UP (ref 0–0)
PLATELET # BLD AUTO: 181 K/UL — SIGNIFICANT CHANGE UP (ref 150–400)
PMV BLD: 10.3 FL — SIGNIFICANT CHANGE UP (ref 7–13)
POTASSIUM SERPL-MCNC: 4.9 MMOL/L — SIGNIFICANT CHANGE UP (ref 3.5–5.3)
POTASSIUM SERPL-SCNC: 4.9 MMOL/L — SIGNIFICANT CHANGE UP (ref 3.5–5.3)
PROT SERPL-MCNC: 5.4 G/DL — LOW (ref 6–8.3)
RBC # BLD: 4.72 M/UL — SIGNIFICANT CHANGE UP (ref 3.8–5.2)
RBC # FLD: 17.2 % — HIGH (ref 10.3–14.5)
SODIUM SERPL-SCNC: 141 MMOL/L — SIGNIFICANT CHANGE UP (ref 135–145)
WBC # BLD: 18.77 K/UL — HIGH (ref 3.8–10.5)
WBC # FLD AUTO: 18.77 K/UL — HIGH (ref 3.8–10.5)

## 2025-07-19 PROCEDURE — 99233 SBSQ HOSP IP/OBS HIGH 50: CPT

## 2025-07-19 RX ORDER — GABAPENTIN 400 MG/1
100 CAPSULE ORAL THREE TIMES A DAY
Refills: 0 | Status: DISCONTINUED | OUTPATIENT
Start: 2025-07-19 | End: 2025-07-22

## 2025-07-19 RX ORDER — LINEZOLID 2 MG/ML
600 INJECTION, SOLUTION INTRAVENOUS EVERY 12 HOURS
Refills: 0 | Status: DISCONTINUED | OUTPATIENT
Start: 2025-07-19 | End: 2025-07-21

## 2025-07-19 RX ORDER — AMPICILLIN SODIUM AND SULBACTAM SODIUM 1; .5 G/1; G/1
1.5 INJECTION, POWDER, FOR SOLUTION INTRAMUSCULAR; INTRAVENOUS ONCE
Refills: 0 | Status: COMPLETED | OUTPATIENT
Start: 2025-07-19 | End: 2025-07-19

## 2025-07-19 RX ORDER — AMPICILLIN SODIUM AND SULBACTAM SODIUM 1; .5 G/1; G/1
INJECTION, POWDER, FOR SOLUTION INTRAMUSCULAR; INTRAVENOUS
Refills: 0 | Status: DISCONTINUED | OUTPATIENT
Start: 2025-07-19 | End: 2025-07-19

## 2025-07-19 RX ORDER — AMPICILLIN SODIUM AND SULBACTAM SODIUM 1; .5 G/1; G/1
1.5 INJECTION, POWDER, FOR SOLUTION INTRAMUSCULAR; INTRAVENOUS EVERY 6 HOURS
Refills: 0 | Status: DISCONTINUED | OUTPATIENT
Start: 2025-07-19 | End: 2025-07-19

## 2025-07-19 RX ORDER — OXYCODONE HYDROCHLORIDE 30 MG/1
5 TABLET ORAL EVERY 6 HOURS
Refills: 0 | Status: DISCONTINUED | OUTPATIENT
Start: 2025-07-19 | End: 2025-07-23

## 2025-07-19 RX ORDER — OXYCODONE HYDROCHLORIDE 30 MG/1
10 TABLET ORAL EVERY 6 HOURS
Refills: 0 | Status: DISCONTINUED | OUTPATIENT
Start: 2025-07-19 | End: 2025-07-23

## 2025-07-19 RX ORDER — PREGABALIN 50 MG/1
25 CAPSULE ORAL
Refills: 0 | Status: DISCONTINUED | OUTPATIENT
Start: 2025-07-19 | End: 2025-07-23

## 2025-07-19 RX ADMIN — LINEZOLID 300 MILLIGRAM(S): 2 INJECTION, SOLUTION INTRAVENOUS at 22:15

## 2025-07-19 RX ADMIN — AMPICILLIN SODIUM AND SULBACTAM SODIUM 100 GRAM(S): 1; .5 INJECTION, POWDER, FOR SOLUTION INTRAMUSCULAR; INTRAVENOUS at 12:27

## 2025-07-19 RX ADMIN — OXYCODONE HYDROCHLORIDE 5 MILLIGRAM(S): 30 TABLET ORAL at 22:32

## 2025-07-19 RX ADMIN — GABAPENTIN 100 MILLIGRAM(S): 400 CAPSULE ORAL at 21:36

## 2025-07-19 RX ADMIN — Medication 0.2 MILLIGRAM(S): at 10:17

## 2025-07-19 RX ADMIN — Medication 0.2 MILLIGRAM(S): at 00:40

## 2025-07-19 RX ADMIN — Medication 40 MILLIGRAM(S): at 05:37

## 2025-07-19 RX ADMIN — HEPARIN SODIUM 5000 UNIT(S): 1000 INJECTION INTRAVENOUS; SUBCUTANEOUS at 21:36

## 2025-07-19 RX ADMIN — OXYCODONE HYDROCHLORIDE 5 MILLIGRAM(S): 30 TABLET ORAL at 21:34

## 2025-07-19 RX ADMIN — Medication 40 MILLIGRAM(S): at 17:32

## 2025-07-19 RX ADMIN — Medication 0.2 MILLIGRAM(S): at 10:32

## 2025-07-19 RX ADMIN — METOPROLOL SUCCINATE 25 MILLIGRAM(S): 50 TABLET, EXTENDED RELEASE ORAL at 05:37

## 2025-07-19 RX ADMIN — AMLODIPINE BESYLATE 10 MILLIGRAM(S): 10 TABLET ORAL at 05:37

## 2025-07-19 RX ADMIN — HEPARIN SODIUM 5000 UNIT(S): 1000 INJECTION INTRAVENOUS; SUBCUTANEOUS at 16:03

## 2025-07-19 RX ADMIN — OXYCODONE HYDROCHLORIDE 10 MILLIGRAM(S): 30 TABLET ORAL at 16:25

## 2025-07-19 RX ADMIN — Medication 0.2 MILLIGRAM(S): at 00:20

## 2025-07-19 RX ADMIN — HEPARIN SODIUM 5000 UNIT(S): 1000 INJECTION INTRAVENOUS; SUBCUTANEOUS at 05:37

## 2025-07-19 RX ADMIN — OXYCODONE HYDROCHLORIDE 10 MILLIGRAM(S): 30 TABLET ORAL at 16:55

## 2025-07-19 RX ADMIN — Medication 1 GRAM(S): at 17:33

## 2025-07-19 RX ADMIN — AMPICILLIN SODIUM AND SULBACTAM SODIUM 100 GRAM(S): 1; .5 INJECTION, POWDER, FOR SOLUTION INTRAMUSCULAR; INTRAVENOUS at 17:31

## 2025-07-19 RX ADMIN — PREGABALIN 25 MILLIGRAM(S): 50 CAPSULE ORAL at 17:33

## 2025-07-20 LAB
ALBUMIN SERPL ELPH-MCNC: 1.9 G/DL — LOW (ref 3.3–5)
ALP SERPL-CCNC: 306 U/L — HIGH (ref 30–120)
ALT FLD-CCNC: 26 U/L — SIGNIFICANT CHANGE UP (ref 10–60)
ANION GAP SERPL CALC-SCNC: 13 MMOL/L — SIGNIFICANT CHANGE UP (ref 5–17)
AST SERPL-CCNC: 27 U/L — SIGNIFICANT CHANGE UP (ref 10–40)
BILIRUB SERPL-MCNC: 1.2 MG/DL — SIGNIFICANT CHANGE UP (ref 0.2–1.2)
BUN SERPL-MCNC: 92 MG/DL — HIGH (ref 7–23)
CALCIUM SERPL-MCNC: 7.9 MG/DL — LOW (ref 8.4–10.5)
CHLORIDE SERPL-SCNC: 105 MMOL/L — SIGNIFICANT CHANGE UP (ref 96–108)
CO2 SERPL-SCNC: 17 MMOL/L — LOW (ref 22–31)
CREAT SERPL-MCNC: 3.62 MG/DL — HIGH (ref 0.5–1.3)
EGFR: 11 ML/MIN/1.73M2 — LOW
EGFR: 11 ML/MIN/1.73M2 — LOW
GLUCOSE SERPL-MCNC: 116 MG/DL — HIGH (ref 70–99)
HCT VFR BLD CALC: 43.5 % — SIGNIFICANT CHANGE UP (ref 34.5–45)
HGB BLD-MCNC: 13.7 G/DL — SIGNIFICANT CHANGE UP (ref 11.5–15.5)
LIDOCAIN IGE QN: >375 U/L — HIGH (ref 16–77)
MCHC RBC-ENTMCNC: 27.6 PG — SIGNIFICANT CHANGE UP (ref 27–34)
MCHC RBC-ENTMCNC: 31.5 G/DL — LOW (ref 32–36)
MCV RBC AUTO: 87.7 FL — SIGNIFICANT CHANGE UP (ref 80–100)
MRSA PCR RESULT.: SIGNIFICANT CHANGE UP
NRBC # BLD AUTO: 0 K/UL — SIGNIFICANT CHANGE UP (ref 0–0)
NRBC # FLD: 0 K/UL — SIGNIFICANT CHANGE UP (ref 0–0)
NRBC BLD AUTO-RTO: 0 /100 WBCS — SIGNIFICANT CHANGE UP (ref 0–0)
PLATELET # BLD AUTO: 149 K/UL — LOW (ref 150–400)
PMV BLD: 10 FL — SIGNIFICANT CHANGE UP (ref 7–13)
POTASSIUM SERPL-MCNC: 4.8 MMOL/L — SIGNIFICANT CHANGE UP (ref 3.5–5.3)
POTASSIUM SERPL-SCNC: 4.8 MMOL/L — SIGNIFICANT CHANGE UP (ref 3.5–5.3)
PROT SERPL-MCNC: 5.5 G/DL — LOW (ref 6–8.3)
RBC # BLD: 4.96 M/UL — SIGNIFICANT CHANGE UP (ref 3.8–5.2)
RBC # FLD: 17.3 % — HIGH (ref 10.3–14.5)
S AUREUS DNA NOSE QL NAA+PROBE: SIGNIFICANT CHANGE UP
SODIUM SERPL-SCNC: 135 MMOL/L — SIGNIFICANT CHANGE UP (ref 135–145)
WBC # BLD: 18.52 K/UL — HIGH (ref 3.8–10.5)
WBC # FLD AUTO: 18.52 K/UL — HIGH (ref 3.8–10.5)

## 2025-07-20 PROCEDURE — 99233 SBSQ HOSP IP/OBS HIGH 50: CPT

## 2025-07-20 RX ADMIN — PREGABALIN 25 MILLIGRAM(S): 50 CAPSULE ORAL at 17:06

## 2025-07-20 RX ADMIN — Medication 4 MILLIGRAM(S): at 23:04

## 2025-07-20 RX ADMIN — OXYCODONE HYDROCHLORIDE 5 MILLIGRAM(S): 30 TABLET ORAL at 18:06

## 2025-07-20 RX ADMIN — OXYCODONE HYDROCHLORIDE 5 MILLIGRAM(S): 30 TABLET ORAL at 09:08

## 2025-07-20 RX ADMIN — HEPARIN SODIUM 5000 UNIT(S): 1000 INJECTION INTRAVENOUS; SUBCUTANEOUS at 13:05

## 2025-07-20 RX ADMIN — PREGABALIN 25 MILLIGRAM(S): 50 CAPSULE ORAL at 05:13

## 2025-07-20 RX ADMIN — HEPARIN SODIUM 5000 UNIT(S): 1000 INJECTION INTRAVENOUS; SUBCUTANEOUS at 05:16

## 2025-07-20 RX ADMIN — Medication 40 MILLIGRAM(S): at 17:06

## 2025-07-20 RX ADMIN — GABAPENTIN 100 MILLIGRAM(S): 400 CAPSULE ORAL at 13:05

## 2025-07-20 RX ADMIN — Medication 0.2 MILLIGRAM(S): at 23:05

## 2025-07-20 RX ADMIN — HEPARIN SODIUM 5000 UNIT(S): 1000 INJECTION INTRAVENOUS; SUBCUTANEOUS at 21:37

## 2025-07-20 RX ADMIN — OXYCODONE HYDROCHLORIDE 5 MILLIGRAM(S): 30 TABLET ORAL at 10:08

## 2025-07-20 RX ADMIN — METOPROLOL SUCCINATE 25 MILLIGRAM(S): 50 TABLET, EXTENDED RELEASE ORAL at 05:17

## 2025-07-20 RX ADMIN — SODIUM CHLORIDE 80 MILLILITER(S): 9 INJECTION, SOLUTION INTRAVENOUS at 09:09

## 2025-07-20 RX ADMIN — LINEZOLID 300 MILLIGRAM(S): 2 INJECTION, SOLUTION INTRAVENOUS at 10:10

## 2025-07-20 RX ADMIN — Medication 40 MILLIGRAM(S): at 05:16

## 2025-07-20 RX ADMIN — GABAPENTIN 100 MILLIGRAM(S): 400 CAPSULE ORAL at 21:37

## 2025-07-20 RX ADMIN — AMLODIPINE BESYLATE 10 MILLIGRAM(S): 10 TABLET ORAL at 05:16

## 2025-07-20 RX ADMIN — OXYCODONE HYDROCHLORIDE 5 MILLIGRAM(S): 30 TABLET ORAL at 17:06

## 2025-07-20 RX ADMIN — LINEZOLID 300 MILLIGRAM(S): 2 INJECTION, SOLUTION INTRAVENOUS at 21:37

## 2025-07-20 RX ADMIN — GABAPENTIN 100 MILLIGRAM(S): 400 CAPSULE ORAL at 05:14

## 2025-07-21 LAB
ALBUMIN SERPL ELPH-MCNC: SIGNIFICANT CHANGE UP (ref 3.3–5)
ALP SERPL-CCNC: 252 U/L — HIGH (ref 30–120)
ALT FLD-CCNC: 21 U/L — SIGNIFICANT CHANGE UP (ref 10–60)
ANION GAP SERPL CALC-SCNC: 13 MMOL/L — SIGNIFICANT CHANGE UP (ref 5–17)
AST SERPL-CCNC: 37 U/L — SIGNIFICANT CHANGE UP (ref 10–40)
BILIRUB SERPL-MCNC: 1 MG/DL — SIGNIFICANT CHANGE UP (ref 0.2–1.2)
BUN SERPL-MCNC: 81 MG/DL — HIGH (ref 7–23)
CALCIUM SERPL-MCNC: 7.4 MG/DL — LOW (ref 8.4–10.5)
CHLORIDE SERPL-SCNC: 101 MMOL/L — SIGNIFICANT CHANGE UP (ref 96–108)
CO2 SERPL-SCNC: 17 MMOL/L — LOW (ref 22–31)
CREAT SERPL-MCNC: 3.34 MG/DL — HIGH (ref 0.5–1.3)
EGFR: 12 ML/MIN/1.73M2 — LOW
EGFR: 12 ML/MIN/1.73M2 — LOW
GLUCOSE SERPL-MCNC: 182 MG/DL — HIGH (ref 70–99)
HCT VFR BLD CALC: 37.6 % — SIGNIFICANT CHANGE UP (ref 34.5–45)
HGB BLD-MCNC: 12.3 G/DL — SIGNIFICANT CHANGE UP (ref 11.5–15.5)
MCHC RBC-ENTMCNC: 28.1 PG — SIGNIFICANT CHANGE UP (ref 27–34)
MCHC RBC-ENTMCNC: 32.7 G/DL — SIGNIFICANT CHANGE UP (ref 32–36)
MCV RBC AUTO: 85.8 FL — SIGNIFICANT CHANGE UP (ref 80–100)
NRBC # BLD AUTO: 0 K/UL — SIGNIFICANT CHANGE UP (ref 0–0)
NRBC # FLD: 0 K/UL — SIGNIFICANT CHANGE UP (ref 0–0)
NRBC BLD AUTO-RTO: 0 /100 WBCS — SIGNIFICANT CHANGE UP (ref 0–0)
PLATELET # BLD AUTO: 119 K/UL — LOW (ref 150–400)
PMV BLD: 11.1 FL — SIGNIFICANT CHANGE UP (ref 7–13)
POTASSIUM SERPL-MCNC: 4 MMOL/L — SIGNIFICANT CHANGE UP (ref 3.5–5.3)
POTASSIUM SERPL-SCNC: 4 MMOL/L — SIGNIFICANT CHANGE UP (ref 3.5–5.3)
PROT SERPL-MCNC: 5.1 G/DL — LOW (ref 6–8.3)
RBC # BLD: 4.38 M/UL — SIGNIFICANT CHANGE UP (ref 3.8–5.2)
RBC # FLD: 17.1 % — HIGH (ref 10.3–14.5)
SODIUM SERPL-SCNC: 131 MMOL/L — LOW (ref 135–145)
WBC # BLD: 19.36 K/UL — HIGH (ref 3.8–10.5)
WBC # FLD AUTO: 19.36 K/UL — HIGH (ref 3.8–10.5)

## 2025-07-21 PROCEDURE — 36415 COLL VENOUS BLD VENIPUNCTURE: CPT

## 2025-07-21 PROCEDURE — 73200 CT UPPER EXTREMITY W/O DYE: CPT

## 2025-07-21 PROCEDURE — 72125 CT NECK SPINE W/O DYE: CPT | Mod: 26

## 2025-07-21 PROCEDURE — 84100 ASSAY OF PHOSPHORUS: CPT

## 2025-07-21 PROCEDURE — 72125 CT NECK SPINE W/O DYE: CPT

## 2025-07-21 PROCEDURE — 74176 CT ABD & PELVIS W/O CONTRAST: CPT

## 2025-07-21 PROCEDURE — 73200 CT UPPER EXTREMITY W/O DYE: CPT | Mod: 26,RT

## 2025-07-21 PROCEDURE — 84484 ASSAY OF TROPONIN QUANT: CPT

## 2025-07-21 PROCEDURE — 83735 ASSAY OF MAGNESIUM: CPT

## 2025-07-21 PROCEDURE — 81001 URINALYSIS AUTO W/SCOPE: CPT

## 2025-07-21 PROCEDURE — 97110 THERAPEUTIC EXERCISES: CPT

## 2025-07-21 PROCEDURE — 97116 GAIT TRAINING THERAPY: CPT

## 2025-07-21 PROCEDURE — 87641 MR-STAPH DNA AMP PROBE: CPT

## 2025-07-21 PROCEDURE — 85025 COMPLETE CBC W/AUTO DIFF WBC: CPT

## 2025-07-21 PROCEDURE — 71045 X-RAY EXAM CHEST 1 VIEW: CPT

## 2025-07-21 PROCEDURE — 97161 PT EVAL LOW COMPLEX 20 MIN: CPT

## 2025-07-21 PROCEDURE — 80053 COMPREHEN METABOLIC PANEL: CPT

## 2025-07-21 PROCEDURE — 85027 COMPLETE CBC AUTOMATED: CPT

## 2025-07-21 PROCEDURE — 99497 ADVNCD CARE PLAN 30 MIN: CPT

## 2025-07-21 PROCEDURE — 87640 STAPH A DNA AMP PROBE: CPT

## 2025-07-21 PROCEDURE — 99233 SBSQ HOSP IP/OBS HIGH 50: CPT

## 2025-07-21 PROCEDURE — 76700 US EXAM ABDOM COMPLETE: CPT

## 2025-07-21 PROCEDURE — 83690 ASSAY OF LIPASE: CPT

## 2025-07-21 PROCEDURE — 93005 ELECTROCARDIOGRAM TRACING: CPT

## 2025-07-21 RX ORDER — COLCHICINE 0.6 MG/1
0.6 TABLET, FILM COATED ORAL EVERY 24 HOURS
Refills: 0 | Status: COMPLETED | OUTPATIENT
Start: 2025-07-21 | End: 2025-07-22

## 2025-07-21 RX ORDER — COLCHICINE 0.6 MG/1
0.6 TABLET, FILM COATED ORAL
Refills: 0 | Status: DISCONTINUED | OUTPATIENT
Start: 2025-07-21 | End: 2025-07-21

## 2025-07-21 RX ORDER — PREDNISONE 20 MG/1
20 TABLET ORAL
Refills: 0 | Status: DISCONTINUED | OUTPATIENT
Start: 2025-07-21 | End: 2025-07-23

## 2025-07-21 RX ORDER — CEFAZOLIN SODIUM IN 0.9 % NACL 3 G/100 ML
1000 INTRAVENOUS SOLUTION, PIGGYBACK (ML) INTRAVENOUS EVERY 24 HOURS
Refills: 0 | Status: DISCONTINUED | OUTPATIENT
Start: 2025-07-21 | End: 2025-07-22

## 2025-07-21 RX ADMIN — OXYCODONE HYDROCHLORIDE 5 MILLIGRAM(S): 30 TABLET ORAL at 13:20

## 2025-07-21 RX ADMIN — GABAPENTIN 100 MILLIGRAM(S): 400 CAPSULE ORAL at 13:20

## 2025-07-21 RX ADMIN — METOPROLOL SUCCINATE 25 MILLIGRAM(S): 50 TABLET, EXTENDED RELEASE ORAL at 05:49

## 2025-07-21 RX ADMIN — PREDNISONE 20 MILLIGRAM(S): 20 TABLET ORAL at 17:31

## 2025-07-21 RX ADMIN — SODIUM CHLORIDE 80 MILLILITER(S): 9 INJECTION, SOLUTION INTRAVENOUS at 14:22

## 2025-07-21 RX ADMIN — OXYCODONE HYDROCHLORIDE 10 MILLIGRAM(S): 30 TABLET ORAL at 18:10

## 2025-07-21 RX ADMIN — COLCHICINE 0.6 MILLIGRAM(S): 0.6 TABLET, FILM COATED ORAL at 17:31

## 2025-07-21 RX ADMIN — OXYCODONE HYDROCHLORIDE 5 MILLIGRAM(S): 30 TABLET ORAL at 14:00

## 2025-07-21 RX ADMIN — OXYCODONE HYDROCHLORIDE 5 MILLIGRAM(S): 30 TABLET ORAL at 04:55

## 2025-07-21 RX ADMIN — SODIUM CHLORIDE 80 MILLILITER(S): 9 INJECTION, SOLUTION INTRAVENOUS at 21:34

## 2025-07-21 RX ADMIN — AMLODIPINE BESYLATE 10 MILLIGRAM(S): 10 TABLET ORAL at 05:50

## 2025-07-21 RX ADMIN — GABAPENTIN 100 MILLIGRAM(S): 400 CAPSULE ORAL at 05:50

## 2025-07-21 RX ADMIN — Medication 100 MILLIGRAM(S): at 05:50

## 2025-07-21 RX ADMIN — OXYCODONE HYDROCHLORIDE 5 MILLIGRAM(S): 30 TABLET ORAL at 03:55

## 2025-07-21 RX ADMIN — Medication 0.2 MILLIGRAM(S): at 00:00

## 2025-07-21 RX ADMIN — Medication 0.2 MILLIGRAM(S): at 10:15

## 2025-07-21 RX ADMIN — Medication 40 MILLIGRAM(S): at 05:50

## 2025-07-21 RX ADMIN — GABAPENTIN 100 MILLIGRAM(S): 400 CAPSULE ORAL at 21:34

## 2025-07-21 RX ADMIN — PREGABALIN 25 MILLIGRAM(S): 50 CAPSULE ORAL at 17:32

## 2025-07-21 RX ADMIN — Medication 40 MILLIGRAM(S): at 17:31

## 2025-07-21 RX ADMIN — HEPARIN SODIUM 5000 UNIT(S): 1000 INJECTION INTRAVENOUS; SUBCUTANEOUS at 21:34

## 2025-07-21 RX ADMIN — HEPARIN SODIUM 5000 UNIT(S): 1000 INJECTION INTRAVENOUS; SUBCUTANEOUS at 06:05

## 2025-07-21 RX ADMIN — PREGABALIN 25 MILLIGRAM(S): 50 CAPSULE ORAL at 05:48

## 2025-07-21 RX ADMIN — HEPARIN SODIUM 5000 UNIT(S): 1000 INJECTION INTRAVENOUS; SUBCUTANEOUS at 13:20

## 2025-07-21 RX ADMIN — Medication 0.2 MILLIGRAM(S): at 09:49

## 2025-07-21 RX ADMIN — OXYCODONE HYDROCHLORIDE 10 MILLIGRAM(S): 30 TABLET ORAL at 17:33

## 2025-07-22 LAB
ALBUMIN SERPL ELPH-MCNC: 1.6 G/DL — LOW (ref 3.3–5)
ALP SERPL-CCNC: 287 U/L — HIGH (ref 30–120)
ALT FLD-CCNC: 18 U/L — SIGNIFICANT CHANGE UP (ref 10–60)
ANION GAP SERPL CALC-SCNC: 10 MMOL/L — SIGNIFICANT CHANGE UP (ref 5–17)
AST SERPL-CCNC: 26 U/L — SIGNIFICANT CHANGE UP (ref 10–40)
BILIRUB SERPL-MCNC: 1.1 MG/DL — SIGNIFICANT CHANGE UP (ref 0.2–1.2)
BUN SERPL-MCNC: 79 MG/DL — HIGH (ref 7–23)
CALCIUM SERPL-MCNC: 8 MG/DL — LOW (ref 8.4–10.5)
CHLORIDE SERPL-SCNC: 96 MMOL/L — SIGNIFICANT CHANGE UP (ref 96–108)
CO2 SERPL-SCNC: 22 MMOL/L — SIGNIFICANT CHANGE UP (ref 22–31)
CREAT SERPL-MCNC: 3.4 MG/DL — HIGH (ref 0.5–1.3)
EGFR: 12 ML/MIN/1.73M2 — LOW
EGFR: 12 ML/MIN/1.73M2 — LOW
GLUCOSE SERPL-MCNC: 204 MG/DL — HIGH (ref 70–99)
HCT VFR BLD CALC: 37.9 % — SIGNIFICANT CHANGE UP (ref 34.5–45)
HGB BLD-MCNC: 12.4 G/DL — SIGNIFICANT CHANGE UP (ref 11.5–15.5)
MCHC RBC-ENTMCNC: 28.1 PG — SIGNIFICANT CHANGE UP (ref 27–34)
MCHC RBC-ENTMCNC: 32.7 G/DL — SIGNIFICANT CHANGE UP (ref 32–36)
MCV RBC AUTO: 85.9 FL — SIGNIFICANT CHANGE UP (ref 80–100)
NRBC # BLD AUTO: 0 K/UL — SIGNIFICANT CHANGE UP (ref 0–0)
NRBC # FLD: 0 K/UL — SIGNIFICANT CHANGE UP (ref 0–0)
NRBC BLD AUTO-RTO: 0 /100 WBCS — SIGNIFICANT CHANGE UP (ref 0–0)
PLATELET # BLD AUTO: 150 K/UL — SIGNIFICANT CHANGE UP (ref 150–400)
PMV BLD: 11.8 FL — SIGNIFICANT CHANGE UP (ref 7–13)
POTASSIUM SERPL-MCNC: 4.4 MMOL/L — SIGNIFICANT CHANGE UP (ref 3.5–5.3)
POTASSIUM SERPL-SCNC: 4.4 MMOL/L — SIGNIFICANT CHANGE UP (ref 3.5–5.3)
PROT SERPL-MCNC: 5.6 G/DL — LOW (ref 6–8.3)
RBC # BLD: 4.41 M/UL — SIGNIFICANT CHANGE UP (ref 3.8–5.2)
RBC # FLD: 16.9 % — HIGH (ref 10.3–14.5)
SODIUM SERPL-SCNC: 128 MMOL/L — LOW (ref 135–145)
URATE SERPL-MCNC: 6 MG/DL — SIGNIFICANT CHANGE UP (ref 2.5–7)
WBC # BLD: 20.75 K/UL — HIGH (ref 3.8–10.5)
WBC # FLD AUTO: 20.75 K/UL — HIGH (ref 3.8–10.5)

## 2025-07-22 PROCEDURE — 99232 SBSQ HOSP IP/OBS MODERATE 35: CPT

## 2025-07-22 RX ORDER — METOCLOPRAMIDE HCL 10 MG
5 TABLET ORAL
Refills: 0 | Status: DISCONTINUED | OUTPATIENT
Start: 2025-07-22 | End: 2025-07-23

## 2025-07-22 RX ORDER — BISACODYL 5 MG
5 TABLET, DELAYED RELEASE (ENTERIC COATED) ORAL EVERY 12 HOURS
Refills: 0 | Status: DISCONTINUED | OUTPATIENT
Start: 2025-07-22 | End: 2025-07-23

## 2025-07-22 RX ORDER — LIDOCAINE HYDROCHLORIDE 20 MG/ML
1 JELLY TOPICAL DAILY
Refills: 0 | Status: DISCONTINUED | OUTPATIENT
Start: 2025-07-22 | End: 2025-07-23

## 2025-07-22 RX ADMIN — SODIUM CHLORIDE 80 MILLILITER(S): 9 INJECTION, SOLUTION INTRAVENOUS at 02:05

## 2025-07-22 RX ADMIN — Medication 40 MILLIGRAM(S): at 18:05

## 2025-07-22 RX ADMIN — OXYCODONE HYDROCHLORIDE 10 MILLIGRAM(S): 30 TABLET ORAL at 10:17

## 2025-07-22 RX ADMIN — HEPARIN SODIUM 5000 UNIT(S): 1000 INJECTION INTRAVENOUS; SUBCUTANEOUS at 05:06

## 2025-07-22 RX ADMIN — HEPARIN SODIUM 5000 UNIT(S): 1000 INJECTION INTRAVENOUS; SUBCUTANEOUS at 14:00

## 2025-07-22 RX ADMIN — AMLODIPINE BESYLATE 10 MILLIGRAM(S): 10 TABLET ORAL at 05:07

## 2025-07-22 RX ADMIN — OXYCODONE HYDROCHLORIDE 5 MILLIGRAM(S): 30 TABLET ORAL at 05:37

## 2025-07-22 RX ADMIN — PREGABALIN 25 MILLIGRAM(S): 50 CAPSULE ORAL at 18:06

## 2025-07-22 RX ADMIN — LIDOCAINE HYDROCHLORIDE 1 PATCH: 20 JELLY TOPICAL at 19:00

## 2025-07-22 RX ADMIN — PREDNISONE 20 MILLIGRAM(S): 20 TABLET ORAL at 05:10

## 2025-07-22 RX ADMIN — Medication 40 MILLIGRAM(S): at 05:09

## 2025-07-22 RX ADMIN — Medication 5 MILLIGRAM(S): at 05:07

## 2025-07-22 RX ADMIN — COLCHICINE 0.6 MILLIGRAM(S): 0.6 TABLET, FILM COATED ORAL at 18:06

## 2025-07-22 RX ADMIN — OXYCODONE HYDROCHLORIDE 5 MILLIGRAM(S): 30 TABLET ORAL at 18:07

## 2025-07-22 RX ADMIN — Medication 5 MILLIGRAM(S): at 18:05

## 2025-07-22 RX ADMIN — PREGABALIN 25 MILLIGRAM(S): 50 CAPSULE ORAL at 05:07

## 2025-07-22 RX ADMIN — Medication 50 MILLILITER(S): at 09:22

## 2025-07-22 RX ADMIN — LIDOCAINE HYDROCHLORIDE 1 PATCH: 20 JELLY TOPICAL at 12:00

## 2025-07-22 RX ADMIN — GABAPENTIN 100 MILLIGRAM(S): 400 CAPSULE ORAL at 05:08

## 2025-07-22 RX ADMIN — OXYCODONE HYDROCHLORIDE 10 MILLIGRAM(S): 30 TABLET ORAL at 11:00

## 2025-07-22 RX ADMIN — PREDNISONE 20 MILLIGRAM(S): 20 TABLET ORAL at 18:06

## 2025-07-22 RX ADMIN — Medication 4 MILLIGRAM(S): at 07:13

## 2025-07-22 RX ADMIN — HEPARIN SODIUM 5000 UNIT(S): 1000 INJECTION INTRAVENOUS; SUBCUTANEOUS at 21:39

## 2025-07-22 RX ADMIN — Medication 100 MILLIGRAM(S): at 05:08

## 2025-07-22 RX ADMIN — METOPROLOL SUCCINATE 25 MILLIGRAM(S): 50 TABLET, EXTENDED RELEASE ORAL at 05:06

## 2025-07-23 ENCOUNTER — TRANSCRIPTION ENCOUNTER (OUTPATIENT)
Age: 89
End: 2025-07-23

## 2025-07-23 VITALS
TEMPERATURE: 98 F | SYSTOLIC BLOOD PRESSURE: 123 MMHG | OXYGEN SATURATION: 94 % | HEART RATE: 64 BPM | DIASTOLIC BLOOD PRESSURE: 70 MMHG

## 2025-07-23 LAB
ALBUMIN SERPL ELPH-MCNC: 1.5 G/DL — LOW (ref 3.3–5)
ALP SERPL-CCNC: 251 U/L — HIGH (ref 30–120)
ALT FLD-CCNC: 15 U/L — SIGNIFICANT CHANGE UP (ref 10–60)
ANION GAP SERPL CALC-SCNC: 11 MMOL/L — SIGNIFICANT CHANGE UP (ref 5–17)
AST SERPL-CCNC: 27 U/L — SIGNIFICANT CHANGE UP (ref 10–40)
BILIRUB SERPL-MCNC: 0.9 MG/DL — SIGNIFICANT CHANGE UP (ref 0.2–1.2)
BUN SERPL-MCNC: 87 MG/DL — HIGH (ref 7–23)
CALCIUM SERPL-MCNC: 8.1 MG/DL — LOW (ref 8.4–10.5)
CHLORIDE SERPL-SCNC: 100 MMOL/L — SIGNIFICANT CHANGE UP (ref 96–108)
CO2 SERPL-SCNC: 19 MMOL/L — LOW (ref 22–31)
CREAT SERPL-MCNC: 3.48 MG/DL — HIGH (ref 0.5–1.3)
EGFR: 12 ML/MIN/1.73M2 — LOW
EGFR: 12 ML/MIN/1.73M2 — LOW
GLUCOSE SERPL-MCNC: 176 MG/DL — HIGH (ref 70–99)
HCT VFR BLD CALC: 37.9 % — SIGNIFICANT CHANGE UP (ref 34.5–45)
HGB BLD-MCNC: 12.3 G/DL — SIGNIFICANT CHANGE UP (ref 11.5–15.5)
MCHC RBC-ENTMCNC: 28 PG — SIGNIFICANT CHANGE UP (ref 27–34)
MCHC RBC-ENTMCNC: 32.5 G/DL — SIGNIFICANT CHANGE UP (ref 32–36)
MCV RBC AUTO: 86.1 FL — SIGNIFICANT CHANGE UP (ref 80–100)
NRBC # BLD AUTO: 0 K/UL — SIGNIFICANT CHANGE UP (ref 0–0)
NRBC # FLD: 0 K/UL — SIGNIFICANT CHANGE UP (ref 0–0)
NRBC BLD AUTO-RTO: 0 /100 WBCS — SIGNIFICANT CHANGE UP (ref 0–0)
PHOSPHATE SERPL-MCNC: 6.5 MG/DL — HIGH (ref 2.5–4.5)
PLATELET # BLD AUTO: 141 K/UL — LOW (ref 150–400)
PMV BLD: 11.6 FL — SIGNIFICANT CHANGE UP (ref 7–13)
POTASSIUM SERPL-MCNC: 4.9 MMOL/L — SIGNIFICANT CHANGE UP (ref 3.5–5.3)
POTASSIUM SERPL-SCNC: 4.9 MMOL/L — SIGNIFICANT CHANGE UP (ref 3.5–5.3)
PROT SERPL-MCNC: 5.2 G/DL — LOW (ref 6–8.3)
RBC # BLD: 4.4 M/UL — SIGNIFICANT CHANGE UP (ref 3.8–5.2)
RBC # FLD: 16.9 % — HIGH (ref 10.3–14.5)
SODIUM SERPL-SCNC: 130 MMOL/L — LOW (ref 135–145)
URATE SERPL-MCNC: 6.4 MG/DL — SIGNIFICANT CHANGE UP (ref 2.5–7)
WBC # BLD: 23.46 K/UL — HIGH (ref 3.8–10.5)
WBC # FLD AUTO: 23.46 K/UL — HIGH (ref 3.8–10.5)

## 2025-07-23 PROCEDURE — 74176 CT ABD & PELVIS W/O CONTRAST: CPT

## 2025-07-23 PROCEDURE — 85025 COMPLETE CBC W/AUTO DIFF WBC: CPT

## 2025-07-23 PROCEDURE — 99285 EMERGENCY DEPT VISIT HI MDM: CPT

## 2025-07-23 PROCEDURE — 99239 HOSP IP/OBS DSCHRG MGMT >30: CPT

## 2025-07-23 PROCEDURE — 97116 GAIT TRAINING THERAPY: CPT

## 2025-07-23 PROCEDURE — 84550 ASSAY OF BLOOD/URIC ACID: CPT

## 2025-07-23 PROCEDURE — 84100 ASSAY OF PHOSPHORUS: CPT

## 2025-07-23 PROCEDURE — 80053 COMPREHEN METABOLIC PANEL: CPT

## 2025-07-23 PROCEDURE — 87641 MR-STAPH DNA AMP PROBE: CPT

## 2025-07-23 PROCEDURE — 97530 THERAPEUTIC ACTIVITIES: CPT

## 2025-07-23 PROCEDURE — 96365 THER/PROPH/DIAG IV INF INIT: CPT

## 2025-07-23 PROCEDURE — 73200 CT UPPER EXTREMITY W/O DYE: CPT

## 2025-07-23 PROCEDURE — 72125 CT NECK SPINE W/O DYE: CPT

## 2025-07-23 PROCEDURE — 71045 X-RAY EXAM CHEST 1 VIEW: CPT

## 2025-07-23 PROCEDURE — 97110 THERAPEUTIC EXERCISES: CPT

## 2025-07-23 PROCEDURE — 84484 ASSAY OF TROPONIN QUANT: CPT

## 2025-07-23 PROCEDURE — 81001 URINALYSIS AUTO W/SCOPE: CPT

## 2025-07-23 PROCEDURE — 85027 COMPLETE CBC AUTOMATED: CPT

## 2025-07-23 PROCEDURE — 87640 STAPH A DNA AMP PROBE: CPT

## 2025-07-23 PROCEDURE — 83735 ASSAY OF MAGNESIUM: CPT

## 2025-07-23 PROCEDURE — 83690 ASSAY OF LIPASE: CPT

## 2025-07-23 PROCEDURE — 96375 TX/PRO/DX INJ NEW DRUG ADDON: CPT

## 2025-07-23 PROCEDURE — 93005 ELECTROCARDIOGRAM TRACING: CPT

## 2025-07-23 PROCEDURE — 36415 COLL VENOUS BLD VENIPUNCTURE: CPT

## 2025-07-23 PROCEDURE — 76700 US EXAM ABDOM COMPLETE: CPT

## 2025-07-23 PROCEDURE — 96361 HYDRATE IV INFUSION ADD-ON: CPT

## 2025-07-23 PROCEDURE — 97161 PT EVAL LOW COMPLEX 20 MIN: CPT

## 2025-07-23 RX ORDER — PREDNISONE 20 MG/1
1 TABLET ORAL
Qty: 0 | Refills: 0 | DISCHARGE
Start: 2025-07-23

## 2025-07-23 RX ORDER — AMLODIPINE BESYLATE 10 MG/1
1 TABLET ORAL
Qty: 0 | Refills: 0 | DISCHARGE
Start: 2025-07-23

## 2025-07-23 RX ORDER — METOCLOPRAMIDE HCL 10 MG
1 TABLET ORAL
Qty: 0 | Refills: 0 | DISCHARGE
Start: 2025-07-23

## 2025-07-23 RX ORDER — LIDOCAINE HYDROCHLORIDE 20 MG/ML
1 JELLY TOPICAL
Qty: 0 | Refills: 0 | DISCHARGE
Start: 2025-07-23

## 2025-07-23 RX ADMIN — METOPROLOL SUCCINATE 25 MILLIGRAM(S): 50 TABLET, EXTENDED RELEASE ORAL at 05:25

## 2025-07-23 RX ADMIN — Medication 5 MILLIGRAM(S): at 05:25

## 2025-07-23 RX ADMIN — Medication 650 MILLIGRAM(S): at 12:32

## 2025-07-23 RX ADMIN — AMLODIPINE BESYLATE 10 MILLIGRAM(S): 10 TABLET ORAL at 05:25

## 2025-07-23 RX ADMIN — PREGABALIN 25 MILLIGRAM(S): 50 CAPSULE ORAL at 05:25

## 2025-07-23 RX ADMIN — Medication 40 MILLIGRAM(S): at 05:24

## 2025-07-23 RX ADMIN — HEPARIN SODIUM 5000 UNIT(S): 1000 INJECTION INTRAVENOUS; SUBCUTANEOUS at 13:50

## 2025-07-23 RX ADMIN — HEPARIN SODIUM 5000 UNIT(S): 1000 INJECTION INTRAVENOUS; SUBCUTANEOUS at 05:24

## 2025-07-23 RX ADMIN — PREDNISONE 20 MILLIGRAM(S): 20 TABLET ORAL at 05:30

## 2025-07-23 RX ADMIN — LIDOCAINE HYDROCHLORIDE 1 PATCH: 20 JELLY TOPICAL at 12:33

## 2025-07-26 ENCOUNTER — INPATIENT (INPATIENT)
Facility: HOSPITAL | Age: 89
LOS: 12 days | Discharge: NURSING, REHAB OR LONG-TERM CARE | DRG: 871 | End: 2025-08-08
Attending: HOSPITALIST | Admitting: GENERAL PRACTICE
Payer: MEDICARE

## 2025-07-26 ENCOUNTER — TRANSCRIPTION ENCOUNTER (OUTPATIENT)
Age: 89
End: 2025-07-26

## 2025-07-26 VITALS
TEMPERATURE: 97 F | OXYGEN SATURATION: 96 % | HEIGHT: 56 IN | WEIGHT: 102.96 LBS | HEART RATE: 63 BPM | DIASTOLIC BLOOD PRESSURE: 66 MMHG | RESPIRATION RATE: 17 BRPM | SYSTOLIC BLOOD PRESSURE: 111 MMHG

## 2025-07-26 DIAGNOSIS — D72.829 ELEVATED WHITE BLOOD CELL COUNT, UNSPECIFIED: ICD-10-CM

## 2025-07-26 DIAGNOSIS — M10.9 GOUT, UNSPECIFIED: ICD-10-CM

## 2025-07-26 DIAGNOSIS — Z87.19 PERSONAL HISTORY OF OTHER DISEASES OF THE DIGESTIVE SYSTEM: ICD-10-CM

## 2025-07-26 DIAGNOSIS — D64.9 ANEMIA, UNSPECIFIED: ICD-10-CM

## 2025-07-26 DIAGNOSIS — R53.1 WEAKNESS: ICD-10-CM

## 2025-07-26 DIAGNOSIS — N17.9 ACUTE KIDNEY FAILURE, UNSPECIFIED: ICD-10-CM

## 2025-07-26 DIAGNOSIS — I10 ESSENTIAL (PRIMARY) HYPERTENSION: ICD-10-CM

## 2025-07-26 DIAGNOSIS — R60.1 GENERALIZED EDEMA: ICD-10-CM

## 2025-07-26 DIAGNOSIS — Z29.9 ENCOUNTER FOR PROPHYLACTIC MEASURES, UNSPECIFIED: ICD-10-CM

## 2025-07-26 DIAGNOSIS — K21.9 GASTRO-ESOPHAGEAL REFLUX DISEASE WITHOUT ESOPHAGITIS: ICD-10-CM

## 2025-07-26 LAB
ALBUMIN SERPL ELPH-MCNC: 1.7 G/DL — LOW (ref 3.3–5)
ALP SERPL-CCNC: 361 U/L — HIGH (ref 40–120)
ALT FLD-CCNC: 26 U/L — SIGNIFICANT CHANGE UP (ref 12–78)
ANION GAP SERPL CALC-SCNC: 9 MMOL/L — SIGNIFICANT CHANGE UP (ref 5–17)
APPEARANCE UR: CLEAR — SIGNIFICANT CHANGE UP
APTT BLD: 31.7 SEC — SIGNIFICANT CHANGE UP (ref 26.1–36.8)
AST SERPL-CCNC: 36 U/L — SIGNIFICANT CHANGE UP (ref 15–37)
BASOPHILS # BLD AUTO: 0.03 K/UL — SIGNIFICANT CHANGE UP (ref 0–0.2)
BASOPHILS # BLD AUTO: 0.04 K/UL — SIGNIFICANT CHANGE UP (ref 0–0.2)
BASOPHILS NFR BLD AUTO: 0.1 % — SIGNIFICANT CHANGE UP (ref 0–2)
BASOPHILS NFR BLD AUTO: 0.1 % — SIGNIFICANT CHANGE UP (ref 0–2)
BILIRUB SERPL-MCNC: 1 MG/DL — SIGNIFICANT CHANGE UP (ref 0.2–1.2)
BILIRUB UR-MCNC: NEGATIVE — SIGNIFICANT CHANGE UP
BUN SERPL-MCNC: 130 MG/DL — HIGH (ref 7–23)
CALCIUM SERPL-MCNC: 7.5 MG/DL — LOW (ref 8.5–10.1)
CHLORIDE SERPL-SCNC: 104 MMOL/L — SIGNIFICANT CHANGE UP (ref 96–108)
CO2 SERPL-SCNC: 21 MMOL/L — LOW (ref 22–31)
COLOR SPEC: YELLOW — SIGNIFICANT CHANGE UP
CREAT SERPL-MCNC: 4.3 MG/DL — HIGH (ref 0.5–1.3)
DIFF PNL FLD: NEGATIVE — SIGNIFICANT CHANGE UP
EGFR: 9 ML/MIN/1.73M2 — LOW
EGFR: 9 ML/MIN/1.73M2 — LOW
EOSINOPHIL # BLD AUTO: 0.26 K/UL — SIGNIFICANT CHANGE UP (ref 0–0.5)
EOSINOPHIL # BLD AUTO: 0.55 K/UL — HIGH (ref 0–0.5)
EOSINOPHIL NFR BLD AUTO: 0.8 % — SIGNIFICANT CHANGE UP (ref 0–6)
EOSINOPHIL NFR BLD AUTO: 1.9 % — SIGNIFICANT CHANGE UP (ref 0–6)
GLUCOSE SERPL-MCNC: 199 MG/DL — HIGH (ref 70–99)
GLUCOSE UR QL: 250 MG/DL
HCT VFR BLD CALC: 29 % — LOW (ref 34.5–45)
HCT VFR BLD CALC: 29.4 % — LOW (ref 34.5–45)
HGB BLD-MCNC: 9.3 G/DL — LOW (ref 11.5–15.5)
HGB BLD-MCNC: 9.4 G/DL — LOW (ref 11.5–15.5)
IMM GRANULOCYTES # BLD AUTO: 0.49 K/UL — HIGH (ref 0–0.07)
IMM GRANULOCYTES # BLD AUTO: 0.56 K/UL — HIGH (ref 0–0.07)
IMM GRANULOCYTES NFR BLD AUTO: 1.7 % — HIGH (ref 0–0.9)
IMM GRANULOCYTES NFR BLD AUTO: 1.7 % — HIGH (ref 0–0.9)
INR BLD: 1.23 RATIO — HIGH (ref 0.85–1.16)
KETONES UR QL: NEGATIVE MG/DL — SIGNIFICANT CHANGE UP
LACTATE SERPL-SCNC: 1.9 MMOL/L — SIGNIFICANT CHANGE UP (ref 0.7–2)
LACTATE SERPL-SCNC: 2.2 MMOL/L — HIGH (ref 0.7–2)
LEUKOCYTE ESTERASE UR-ACNC: NEGATIVE — SIGNIFICANT CHANGE UP
LIDOCAIN IGE QN: 95 U/L — HIGH (ref 13–75)
LYMPHOCYTES # BLD AUTO: 4.17 K/UL — HIGH (ref 1–3.3)
LYMPHOCYTES # BLD AUTO: 5 K/UL — HIGH (ref 1–3.3)
LYMPHOCYTES NFR BLD AUTO: 14.8 % — SIGNIFICANT CHANGE UP (ref 13–44)
LYMPHOCYTES NFR BLD AUTO: 15.3 % — SIGNIFICANT CHANGE UP (ref 13–44)
MCHC RBC-ENTMCNC: 28 PG — SIGNIFICANT CHANGE UP (ref 27–34)
MCHC RBC-ENTMCNC: 28.3 PG — SIGNIFICANT CHANGE UP (ref 27–34)
MCHC RBC-ENTMCNC: 32 G/DL — SIGNIFICANT CHANGE UP (ref 32–36)
MCHC RBC-ENTMCNC: 32.1 G/DL — SIGNIFICANT CHANGE UP (ref 32–36)
MCV RBC AUTO: 87.5 FL — SIGNIFICANT CHANGE UP (ref 80–100)
MCV RBC AUTO: 88.1 FL — SIGNIFICANT CHANGE UP (ref 80–100)
MONOCYTES # BLD AUTO: 2.34 K/UL — HIGH (ref 0–0.9)
MONOCYTES # BLD AUTO: 2.71 K/UL — HIGH (ref 0–0.9)
MONOCYTES NFR BLD AUTO: 8.3 % — SIGNIFICANT CHANGE UP (ref 2–14)
MONOCYTES NFR BLD AUTO: 8.3 % — SIGNIFICANT CHANGE UP (ref 2–14)
NEUTROPHILS # BLD AUTO: 20.66 K/UL — HIGH (ref 1.8–7.4)
NEUTROPHILS # BLD AUTO: 24.2 K/UL — HIGH (ref 1.8–7.4)
NEUTROPHILS NFR BLD AUTO: 73.2 % — SIGNIFICANT CHANGE UP (ref 43–77)
NEUTROPHILS NFR BLD AUTO: 73.8 % — SIGNIFICANT CHANGE UP (ref 43–77)
NITRITE UR-MCNC: NEGATIVE — SIGNIFICANT CHANGE UP
NRBC # BLD AUTO: 0.05 K/UL — HIGH (ref 0–0)
NRBC # BLD AUTO: 0.07 K/UL — HIGH (ref 0–0)
NRBC # FLD: 0.05 K/UL — HIGH (ref 0–0)
NRBC # FLD: 0.07 K/UL — HIGH (ref 0–0)
NRBC BLD AUTO-RTO: 0 /100 WBCS — SIGNIFICANT CHANGE UP (ref 0–0)
NRBC BLD AUTO-RTO: 0 /100 WBCS — SIGNIFICANT CHANGE UP (ref 0–0)
PH UR: 5 — SIGNIFICANT CHANGE UP (ref 5–8)
PLATELET # BLD AUTO: 204 K/UL — SIGNIFICANT CHANGE UP (ref 150–400)
PLATELET # BLD AUTO: 233 K/UL — SIGNIFICANT CHANGE UP (ref 150–400)
PMV BLD: 11.6 FL — SIGNIFICANT CHANGE UP (ref 7–13)
PMV BLD: 11.8 FL — SIGNIFICANT CHANGE UP (ref 7–13)
POTASSIUM SERPL-MCNC: 5.3 MMOL/L — SIGNIFICANT CHANGE UP (ref 3.5–5.3)
POTASSIUM SERPL-SCNC: 5.3 MMOL/L — SIGNIFICANT CHANGE UP (ref 3.5–5.3)
PROT SERPL-MCNC: 4.8 G/DL — LOW (ref 6–8.3)
PROT UR-MCNC: 100 MG/DL
PROTHROM AB SERPL-ACNC: 14.5 SEC — HIGH (ref 9.9–13.4)
RBC # BLD: 3.29 M/UL — LOW (ref 3.8–5.2)
RBC # BLD: 3.36 M/UL — LOW (ref 3.8–5.2)
RBC # FLD: 17 % — HIGH (ref 10.3–14.5)
RBC # FLD: 17.1 % — HIGH (ref 10.3–14.5)
SODIUM SERPL-SCNC: 134 MMOL/L — LOW (ref 135–145)
SP GR SPEC: 1.01 — SIGNIFICANT CHANGE UP (ref 1–1.03)
UROBILINOGEN FLD QL: 0.2 MG/DL — SIGNIFICANT CHANGE UP (ref 0.2–1)
WBC # BLD: 28.24 K/UL — HIGH (ref 3.8–10.5)
WBC # BLD: 32.77 K/UL — HIGH (ref 3.8–10.5)
WBC # FLD AUTO: 28.24 K/UL — HIGH (ref 3.8–10.5)
WBC # FLD AUTO: 32.77 K/UL — HIGH (ref 3.8–10.5)

## 2025-07-26 PROCEDURE — 71045 X-RAY EXAM CHEST 1 VIEW: CPT | Mod: 26

## 2025-07-26 PROCEDURE — 71045 X-RAY EXAM CHEST 1 VIEW: CPT

## 2025-07-26 PROCEDURE — 85730 THROMBOPLASTIN TIME PARTIAL: CPT

## 2025-07-26 PROCEDURE — 93010 ELECTROCARDIOGRAM REPORT: CPT

## 2025-07-26 PROCEDURE — 81001 URINALYSIS AUTO W/SCOPE: CPT

## 2025-07-26 PROCEDURE — 83605 ASSAY OF LACTIC ACID: CPT

## 2025-07-26 PROCEDURE — 83690 ASSAY OF LIPASE: CPT

## 2025-07-26 PROCEDURE — 36415 COLL VENOUS BLD VENIPUNCTURE: CPT

## 2025-07-26 PROCEDURE — 87040 BLOOD CULTURE FOR BACTERIA: CPT

## 2025-07-26 PROCEDURE — 85610 PROTHROMBIN TIME: CPT

## 2025-07-26 PROCEDURE — 86901 BLOOD TYPING SEROLOGIC RH(D): CPT

## 2025-07-26 PROCEDURE — 80053 COMPREHEN METABOLIC PANEL: CPT

## 2025-07-26 PROCEDURE — 86900 BLOOD TYPING SEROLOGIC ABO: CPT

## 2025-07-26 PROCEDURE — 99223 1ST HOSP IP/OBS HIGH 75: CPT

## 2025-07-26 PROCEDURE — 74176 CT ABD & PELVIS W/O CONTRAST: CPT | Mod: 26

## 2025-07-26 PROCEDURE — 85025 COMPLETE CBC W/AUTO DIFF WBC: CPT

## 2025-07-26 PROCEDURE — 74176 CT ABD & PELVIS W/O CONTRAST: CPT

## 2025-07-26 PROCEDURE — P9047: CPT

## 2025-07-26 PROCEDURE — 99285 EMERGENCY DEPT VISIT HI MDM: CPT | Mod: FS

## 2025-07-26 PROCEDURE — 86850 RBC ANTIBODY SCREEN: CPT

## 2025-07-26 RX ORDER — CYST/ALA/Q10/PHOS.SER/DHA/BROC 100-20-50
15 POWDER (GRAM) ORAL DAILY
Refills: 0 | Status: DISCONTINUED | OUTPATIENT
Start: 2025-07-26 | End: 2025-08-03

## 2025-07-26 RX ORDER — METRONIDAZOLE 250 MG
500 TABLET ORAL EVERY 8 HOURS
Refills: 0 | Status: DISCONTINUED | OUTPATIENT
Start: 2025-07-26 | End: 2025-07-28

## 2025-07-26 RX ORDER — SUCRALFATE 1 G
1 TABLET ORAL
Refills: 0 | Status: DISCONTINUED | OUTPATIENT
Start: 2025-07-26 | End: 2025-08-08

## 2025-07-26 RX ORDER — CEFEPIME 2 G/20ML
1000 INJECTION, POWDER, FOR SOLUTION INTRAVENOUS EVERY 24 HOURS
Refills: 0 | Status: COMPLETED | OUTPATIENT
Start: 2025-07-26 | End: 2025-08-02

## 2025-07-26 RX ORDER — LIDOCAINE HYDROCHLORIDE 20 MG/ML
1 JELLY TOPICAL EVERY 24 HOURS
Refills: 0 | Status: DISCONTINUED | OUTPATIENT
Start: 2025-07-26 | End: 2025-08-08

## 2025-07-26 RX ORDER — ALBUMIN (HUMAN) 12.5 G/50ML
50 INJECTION, SOLUTION INTRAVENOUS EVERY 8 HOURS
Refills: 0 | Status: COMPLETED | OUTPATIENT
Start: 2025-07-26 | End: 2025-07-28

## 2025-07-26 RX ORDER — METOPROLOL SUCCINATE 50 MG/1
25 TABLET, EXTENDED RELEASE ORAL DAILY
Refills: 0 | Status: DISCONTINUED | OUTPATIENT
Start: 2025-07-26 | End: 2025-08-08

## 2025-07-26 RX ORDER — PIPERACILLIN-TAZO-DEXTROSE,ISO 3.375G/5
3.38 IV SOLUTION, PIGGYBACK PREMIX FROZEN(ML) INTRAVENOUS ONCE
Refills: 0 | Status: COMPLETED | OUTPATIENT
Start: 2025-07-26 | End: 2025-07-26

## 2025-07-26 RX ORDER — ACETAMINOPHEN 500 MG/5ML
650 LIQUID (ML) ORAL EVERY 6 HOURS
Refills: 0 | Status: DISCONTINUED | OUTPATIENT
Start: 2025-07-26 | End: 2025-08-08

## 2025-07-26 RX ORDER — HEPARIN SODIUM 1000 [USP'U]/ML
5000 INJECTION INTRAVENOUS; SUBCUTANEOUS EVERY 8 HOURS
Refills: 0 | Status: DISCONTINUED | OUTPATIENT
Start: 2025-07-26 | End: 2025-07-26

## 2025-07-26 RX ADMIN — Medication 100 MILLIGRAM(S): at 21:30

## 2025-07-26 RX ADMIN — Medication 1000 MILLILITER(S): at 16:11

## 2025-07-26 RX ADMIN — Medication 1 GRAM(S): at 21:30

## 2025-07-26 RX ADMIN — CEFEPIME 100 MILLIGRAM(S): 2 INJECTION, POWDER, FOR SOLUTION INTRAVENOUS at 19:18

## 2025-07-26 RX ADMIN — Medication 650 MILLIGRAM(S): at 19:08

## 2025-07-26 RX ADMIN — Medication 650 MILLIGRAM(S): at 19:44

## 2025-07-26 RX ADMIN — Medication 200 GRAM(S): at 16:51

## 2025-07-26 RX ADMIN — ALBUMIN (HUMAN) 50 MILLILITER(S): 12.5 INJECTION, SOLUTION INTRAVENOUS at 23:27

## 2025-07-27 DIAGNOSIS — R60.1 GENERALIZED EDEMA: ICD-10-CM

## 2025-07-27 LAB
ALBUMIN SERPL ELPH-MCNC: 2.2 G/DL — LOW (ref 3.3–5)
ALP SERPL-CCNC: 263 U/L — HIGH (ref 40–120)
ALT FLD-CCNC: 23 U/L — SIGNIFICANT CHANGE UP (ref 12–78)
ANION GAP SERPL CALC-SCNC: 11 MMOL/L — SIGNIFICANT CHANGE UP (ref 5–17)
AST SERPL-CCNC: 33 U/L — SIGNIFICANT CHANGE UP (ref 15–37)
BASOPHILS # BLD AUTO: 0.02 K/UL — SIGNIFICANT CHANGE UP (ref 0–0.2)
BASOPHILS NFR BLD AUTO: 0.1 % — SIGNIFICANT CHANGE UP (ref 0–2)
BILIRUB SERPL-MCNC: 1.1 MG/DL — SIGNIFICANT CHANGE UP (ref 0.2–1.2)
BUN SERPL-MCNC: 122 MG/DL — HIGH (ref 7–23)
CALCIUM SERPL-MCNC: 7.4 MG/DL — LOW (ref 8.5–10.1)
CHLORIDE SERPL-SCNC: 110 MMOL/L — HIGH (ref 96–108)
CHOLEST SERPL-MCNC: 129 MG/DL — SIGNIFICANT CHANGE UP
CO2 SERPL-SCNC: 17 MMOL/L — LOW (ref 22–31)
CREAT SERPL-MCNC: 4.2 MG/DL — HIGH (ref 0.5–1.3)
EGFR: 9 ML/MIN/1.73M2 — LOW
EGFR: 9 ML/MIN/1.73M2 — LOW
EOSINOPHIL # BLD AUTO: 0.75 K/UL — HIGH (ref 0–0.5)
EOSINOPHIL NFR BLD AUTO: 3.2 % — SIGNIFICANT CHANGE UP (ref 0–6)
FERRITIN SERPL-MCNC: 654 NG/ML — HIGH (ref 13–330)
FOLATE SERPL-MCNC: 6.6 NG/ML — SIGNIFICANT CHANGE UP
GLUCOSE SERPL-MCNC: 124 MG/DL — HIGH (ref 70–99)
HCT VFR BLD CALC: 25.4 % — LOW (ref 34.5–45)
HCT VFR BLD CALC: 25.8 % — LOW (ref 34.5–45)
HDLC SERPL-MCNC: 29 MG/DL — LOW
HGB BLD-MCNC: 8.1 G/DL — LOW (ref 11.5–15.5)
HGB BLD-MCNC: 8.2 G/DL — LOW (ref 11.5–15.5)
IMM GRANULOCYTES # BLD AUTO: 0.36 K/UL — HIGH (ref 0–0.07)
IMM GRANULOCYTES NFR BLD AUTO: 1.5 % — HIGH (ref 0–0.9)
IMMATURE RETICULOCYTE FRACTION %: 30.3 % — SIGNIFICANT CHANGE UP
IRON SATN MFR SERPL: 20 % — SIGNIFICANT CHANGE UP (ref 14–50)
IRON SATN MFR SERPL: 35 UG/DL — SIGNIFICANT CHANGE UP (ref 30–160)
LDLC SERPL-MCNC: 65 MG/DL — SIGNIFICANT CHANGE UP
LIPID PNL WITH DIRECT LDL SERPL: 65 MG/DL — SIGNIFICANT CHANGE UP
LYMPHOCYTES # BLD AUTO: 3.78 K/UL — HIGH (ref 1–3.3)
LYMPHOCYTES NFR BLD AUTO: 16.2 % — SIGNIFICANT CHANGE UP (ref 13–44)
MCHC RBC-ENTMCNC: 28.2 PG — SIGNIFICANT CHANGE UP (ref 27–34)
MCHC RBC-ENTMCNC: 28.5 PG — SIGNIFICANT CHANGE UP (ref 27–34)
MCHC RBC-ENTMCNC: 31.8 G/DL — LOW (ref 32–36)
MCHC RBC-ENTMCNC: 31.9 G/DL — LOW (ref 32–36)
MCV RBC AUTO: 88.5 FL — SIGNIFICANT CHANGE UP (ref 80–100)
MCV RBC AUTO: 89.6 FL — SIGNIFICANT CHANGE UP (ref 80–100)
MONOCYTES # BLD AUTO: 1.7 K/UL — HIGH (ref 0–0.9)
MONOCYTES NFR BLD AUTO: 7.3 % — SIGNIFICANT CHANGE UP (ref 2–14)
NEUTROPHILS # BLD AUTO: 16.71 K/UL — HIGH (ref 1.8–7.4)
NEUTROPHILS NFR BLD AUTO: 71.7 % — SIGNIFICANT CHANGE UP (ref 43–77)
NONHDLC SERPL-MCNC: 100 MG/DL — SIGNIFICANT CHANGE UP
NRBC # BLD AUTO: 0.03 K/UL — HIGH (ref 0–0)
NRBC # BLD AUTO: 0.03 K/UL — HIGH (ref 0–0)
NRBC # FLD: 0.03 K/UL — HIGH (ref 0–0)
NRBC # FLD: 0.03 K/UL — HIGH (ref 0–0)
NRBC BLD AUTO-RTO: 0 /100 WBCS — SIGNIFICANT CHANGE UP (ref 0–0)
NRBC BLD AUTO-RTO: 0 /100 WBCS — SIGNIFICANT CHANGE UP (ref 0–0)
NT-PROBNP SERPL-SCNC: 1220 PG/ML — HIGH (ref 0–450)
OSMOLALITY UR: 333 MOSM/KG — SIGNIFICANT CHANGE UP (ref 50–1200)
PLATELET # BLD AUTO: 119 K/UL — LOW (ref 150–400)
PLATELET # BLD AUTO: 145 K/UL — LOW (ref 150–400)
PMV BLD: 11 FL — SIGNIFICANT CHANGE UP (ref 7–13)
PMV BLD: 11.7 FL — SIGNIFICANT CHANGE UP (ref 7–13)
POTASSIUM SERPL-MCNC: 5.1 MMOL/L — SIGNIFICANT CHANGE UP (ref 3.5–5.3)
POTASSIUM SERPL-SCNC: 5.1 MMOL/L — SIGNIFICANT CHANGE UP (ref 3.5–5.3)
PROT SERPL-MCNC: 4.7 G/DL — LOW (ref 6–8.3)
RBC # BLD: 2.87 M/UL — LOW (ref 3.8–5.2)
RBC # BLD: 2.88 M/UL — LOW (ref 3.8–5.2)
RBC # FLD: 17.2 % — HIGH (ref 10.3–14.5)
RBC # FLD: 17.2 % — HIGH (ref 10.3–14.5)
RETICS #: 62.7 K/UL — SIGNIFICANT CHANGE UP (ref 25–125)
RETICS/RBC NFR: 2.2 % — SIGNIFICANT CHANGE UP (ref 0.5–2.5)
RETICULOCYTE HEMOGLOBIN EQUIVALENT: 34.9 PG — SIGNIFICANT CHANGE UP (ref 30.6–40.7)
SODIUM SERPL-SCNC: 138 MMOL/L — SIGNIFICANT CHANGE UP (ref 135–145)
SODIUM UR-SCNC: <20 MMOL/L — SIGNIFICANT CHANGE UP
T4 FREE SERPL-MCNC: 1.2 NG/DL — SIGNIFICANT CHANGE UP (ref 0.9–1.8)
TIBC SERPL-MCNC: 173 UG/DL — LOW (ref 220–430)
TRANSFERRIN SERPL-MCNC: 117 MG/DL — LOW (ref 200–360)
TRIGL SERPL-MCNC: 208 MG/DL — HIGH
TSH SERPL-MCNC: 1.13 UIU/ML — SIGNIFICANT CHANGE UP (ref 0.36–3.74)
UIBC SERPL-MCNC: 138 UG/DL — SIGNIFICANT CHANGE UP (ref 110–370)
URATE SERPL-MCNC: 7.9 MG/DL — HIGH (ref 2.5–7)
VIT B12 SERPL-MCNC: 1302 PG/ML — HIGH (ref 232–1245)
WBC # BLD: 19.93 K/UL — HIGH (ref 3.8–10.5)
WBC # BLD: 23.32 K/UL — HIGH (ref 3.8–10.5)
WBC # FLD AUTO: 19.93 K/UL — HIGH (ref 3.8–10.5)
WBC # FLD AUTO: 23.32 K/UL — HIGH (ref 3.8–10.5)

## 2025-07-27 PROCEDURE — 85730 THROMBOPLASTIN TIME PARTIAL: CPT

## 2025-07-27 PROCEDURE — 84550 ASSAY OF BLOOD/URIC ACID: CPT

## 2025-07-27 PROCEDURE — 93970 EXTREMITY STUDY: CPT | Mod: 26

## 2025-07-27 PROCEDURE — 82728 ASSAY OF FERRITIN: CPT

## 2025-07-27 PROCEDURE — 71045 X-RAY EXAM CHEST 1 VIEW: CPT

## 2025-07-27 PROCEDURE — 74176 CT ABD & PELVIS W/O CONTRAST: CPT

## 2025-07-27 PROCEDURE — 83550 IRON BINDING TEST: CPT

## 2025-07-27 PROCEDURE — 93970 EXTREMITY STUDY: CPT

## 2025-07-27 PROCEDURE — 85045 AUTOMATED RETICULOCYTE COUNT: CPT

## 2025-07-27 PROCEDURE — 87040 BLOOD CULTURE FOR BACTERIA: CPT

## 2025-07-27 PROCEDURE — 83540 ASSAY OF IRON: CPT

## 2025-07-27 PROCEDURE — 84466 ASSAY OF TRANSFERRIN: CPT

## 2025-07-27 PROCEDURE — 86900 BLOOD TYPING SEROLOGIC ABO: CPT

## 2025-07-27 PROCEDURE — 85610 PROTHROMBIN TIME: CPT

## 2025-07-27 PROCEDURE — 82746 ASSAY OF FOLIC ACID SERUM: CPT

## 2025-07-27 PROCEDURE — 93005 ELECTROCARDIOGRAM TRACING: CPT

## 2025-07-27 PROCEDURE — 81001 URINALYSIS AUTO W/SCOPE: CPT

## 2025-07-27 PROCEDURE — 99233 SBSQ HOSP IP/OBS HIGH 50: CPT

## 2025-07-27 PROCEDURE — 84300 ASSAY OF URINE SODIUM: CPT

## 2025-07-27 PROCEDURE — 83036 HEMOGLOBIN GLYCOSYLATED A1C: CPT

## 2025-07-27 PROCEDURE — 86850 RBC ANTIBODY SCREEN: CPT

## 2025-07-27 PROCEDURE — 84443 ASSAY THYROID STIM HORMONE: CPT

## 2025-07-27 PROCEDURE — P9047: CPT

## 2025-07-27 PROCEDURE — 83605 ASSAY OF LACTIC ACID: CPT

## 2025-07-27 PROCEDURE — 82607 VITAMIN B-12: CPT

## 2025-07-27 PROCEDURE — 85027 COMPLETE CBC AUTOMATED: CPT

## 2025-07-27 PROCEDURE — 83935 ASSAY OF URINE OSMOLALITY: CPT

## 2025-07-27 PROCEDURE — 80061 LIPID PANEL: CPT

## 2025-07-27 PROCEDURE — 85025 COMPLETE CBC W/AUTO DIFF WBC: CPT

## 2025-07-27 PROCEDURE — 80053 COMPREHEN METABOLIC PANEL: CPT

## 2025-07-27 PROCEDURE — 36415 COLL VENOUS BLD VENIPUNCTURE: CPT

## 2025-07-27 PROCEDURE — 84439 ASSAY OF FREE THYROXINE: CPT

## 2025-07-27 PROCEDURE — 83880 ASSAY OF NATRIURETIC PEPTIDE: CPT

## 2025-07-27 PROCEDURE — 86901 BLOOD TYPING SEROLOGIC RH(D): CPT

## 2025-07-27 PROCEDURE — 83690 ASSAY OF LIPASE: CPT

## 2025-07-27 RX ORDER — LIDOCAINE HYDROCHLORIDE 20 MG/ML
1 JELLY TOPICAL DAILY
Refills: 0 | Status: DISCONTINUED | OUTPATIENT
Start: 2025-07-27 | End: 2025-08-08

## 2025-07-27 RX ADMIN — LIDOCAINE HYDROCHLORIDE 1 PATCH: 20 JELLY TOPICAL at 19:52

## 2025-07-27 RX ADMIN — Medication 100 MILLIGRAM(S): at 13:50

## 2025-07-27 RX ADMIN — Medication 1 GRAM(S): at 17:13

## 2025-07-27 RX ADMIN — Medication 40 MILLIGRAM(S): at 06:36

## 2025-07-27 RX ADMIN — LIDOCAINE HYDROCHLORIDE 1 PATCH: 20 JELLY TOPICAL at 15:04

## 2025-07-27 RX ADMIN — CEFEPIME 100 MILLIGRAM(S): 2 INJECTION, POWDER, FOR SOLUTION INTRAVENOUS at 18:10

## 2025-07-27 RX ADMIN — ALBUMIN (HUMAN) 50 MILLILITER(S): 12.5 INJECTION, SOLUTION INTRAVENOUS at 13:50

## 2025-07-27 RX ADMIN — Medication 15 MILLILITER(S): at 12:09

## 2025-07-27 RX ADMIN — Medication 100 MILLIGRAM(S): at 22:29

## 2025-07-27 RX ADMIN — Medication 1 GRAM(S): at 12:09

## 2025-07-27 RX ADMIN — Medication 100 MILLIGRAM(S): at 05:33

## 2025-07-27 RX ADMIN — LIDOCAINE HYDROCHLORIDE 1 PATCH: 20 JELLY TOPICAL at 19:51

## 2025-07-27 RX ADMIN — LIDOCAINE HYDROCHLORIDE 1 PATCH: 20 JELLY TOPICAL at 16:39

## 2025-07-27 RX ADMIN — ALBUMIN (HUMAN) 50 MILLILITER(S): 12.5 INJECTION, SOLUTION INTRAVENOUS at 22:29

## 2025-07-27 RX ADMIN — ALBUMIN (HUMAN) 50 MILLILITER(S): 12.5 INJECTION, SOLUTION INTRAVENOUS at 05:31

## 2025-07-27 RX ADMIN — Medication 40 MILLIGRAM(S): at 17:13

## 2025-07-27 RX ADMIN — Medication 650 MILLIGRAM(S): at 15:59

## 2025-07-27 RX ADMIN — Medication 650 MILLIGRAM(S): at 14:59

## 2025-07-27 RX ADMIN — Medication 1 GRAM(S): at 05:32

## 2025-07-28 ENCOUNTER — RESULT REVIEW (OUTPATIENT)
Age: 89
End: 2025-07-28

## 2025-07-28 LAB
A1C WITH ESTIMATED AVERAGE GLUCOSE RESULT: 7.1 % — HIGH (ref 4–5.6)
ABO RH CONFIRMATION: SIGNIFICANT CHANGE UP
ALBUMIN SERPL ELPH-MCNC: 2.9 G/DL — LOW (ref 3.3–5)
ALP SERPL-CCNC: 279 U/L — HIGH (ref 40–120)
ALT FLD-CCNC: 29 U/L — SIGNIFICANT CHANGE UP (ref 12–78)
ANION GAP SERPL CALC-SCNC: 9 MMOL/L — SIGNIFICANT CHANGE UP (ref 5–17)
AST SERPL-CCNC: 25 U/L — SIGNIFICANT CHANGE UP (ref 15–37)
BILIRUB SERPL-MCNC: 1.2 MG/DL — SIGNIFICANT CHANGE UP (ref 0.2–1.2)
BUN SERPL-MCNC: 118 MG/DL — HIGH (ref 7–23)
CALCIUM SERPL-MCNC: 7.6 MG/DL — LOW (ref 8.5–10.1)
CHLORIDE SERPL-SCNC: 111 MMOL/L — HIGH (ref 96–108)
CO2 SERPL-SCNC: 19 MMOL/L — LOW (ref 22–31)
CREAT SERPL-MCNC: 4.3 MG/DL — HIGH (ref 0.5–1.3)
EGFR: 9 ML/MIN/1.73M2 — LOW
EGFR: 9 ML/MIN/1.73M2 — LOW
ESTIMATED AVERAGE GLUCOSE: 157 MG/DL — HIGH (ref 68–114)
GLUCOSE SERPL-MCNC: 143 MG/DL — HIGH (ref 70–99)
HCT VFR BLD CALC: 21 % — CRITICAL LOW (ref 34.5–45)
HCT VFR BLD CALC: 22.3 % — LOW (ref 34.5–45)
HGB BLD-MCNC: 7 G/DL — CRITICAL LOW (ref 11.5–15.5)
HGB BLD-MCNC: 7 G/DL — CRITICAL LOW (ref 11.5–15.5)
MAGNESIUM SERPL-MCNC: 2.3 MG/DL — SIGNIFICANT CHANGE UP (ref 1.6–2.6)
MCHC RBC-ENTMCNC: 28.3 PG — SIGNIFICANT CHANGE UP (ref 27–34)
MCHC RBC-ENTMCNC: 28.7 PG — SIGNIFICANT CHANGE UP (ref 27–34)
MCHC RBC-ENTMCNC: 31.4 G/DL — LOW (ref 32–36)
MCHC RBC-ENTMCNC: 33.3 G/DL — SIGNIFICANT CHANGE UP (ref 32–36)
MCV RBC AUTO: 86.1 FL — SIGNIFICANT CHANGE UP (ref 80–100)
MCV RBC AUTO: 90.3 FL — SIGNIFICANT CHANGE UP (ref 80–100)
NRBC # BLD AUTO: 0.02 K/UL — HIGH (ref 0–0)
NRBC # BLD AUTO: 0.02 K/UL — HIGH (ref 0–0)
NRBC # FLD: 0.02 K/UL — HIGH (ref 0–0)
NRBC # FLD: 0.02 K/UL — HIGH (ref 0–0)
NRBC BLD AUTO-RTO: 0 /100 WBCS — SIGNIFICANT CHANGE UP (ref 0–0)
NRBC BLD AUTO-RTO: 0 /100 WBCS — SIGNIFICANT CHANGE UP (ref 0–0)
PHOSPHATE SERPL-MCNC: 5.4 MG/DL — HIGH (ref 2.5–4.5)
PLATELET # BLD AUTO: 122 K/UL — LOW (ref 150–400)
PLATELET # BLD AUTO: 123 K/UL — LOW (ref 150–400)
PMV BLD: 11.1 FL — SIGNIFICANT CHANGE UP (ref 7–13)
PMV BLD: 12 FL — SIGNIFICANT CHANGE UP (ref 7–13)
POTASSIUM SERPL-MCNC: 4.7 MMOL/L — SIGNIFICANT CHANGE UP (ref 3.5–5.3)
POTASSIUM SERPL-SCNC: 4.7 MMOL/L — SIGNIFICANT CHANGE UP (ref 3.5–5.3)
PROT SERPL-MCNC: 4.8 G/DL — LOW (ref 6–8.3)
RBC # BLD: 2.44 M/UL — LOW (ref 3.8–5.2)
RBC # BLD: 2.47 M/UL — LOW (ref 3.8–5.2)
RBC # FLD: 17.1 % — HIGH (ref 10.3–14.5)
RBC # FLD: 17.2 % — HIGH (ref 10.3–14.5)
SODIUM SERPL-SCNC: 139 MMOL/L — SIGNIFICANT CHANGE UP (ref 135–145)
WBC # BLD: 16.66 K/UL — HIGH (ref 3.8–10.5)
WBC # BLD: 17.06 K/UL — HIGH (ref 3.8–10.5)
WBC # FLD AUTO: 16.66 K/UL — HIGH (ref 3.8–10.5)
WBC # FLD AUTO: 17.06 K/UL — HIGH (ref 3.8–10.5)

## 2025-07-28 PROCEDURE — 93970 EXTREMITY STUDY: CPT

## 2025-07-28 PROCEDURE — 82746 ASSAY OF FOLIC ACID SERUM: CPT

## 2025-07-28 PROCEDURE — 80061 LIPID PANEL: CPT

## 2025-07-28 PROCEDURE — 85025 COMPLETE CBC W/AUTO DIFF WBC: CPT

## 2025-07-28 PROCEDURE — 71045 X-RAY EXAM CHEST 1 VIEW: CPT

## 2025-07-28 PROCEDURE — 86900 BLOOD TYPING SEROLOGIC ABO: CPT

## 2025-07-28 PROCEDURE — 82728 ASSAY OF FERRITIN: CPT

## 2025-07-28 PROCEDURE — 84439 ASSAY OF FREE THYROXINE: CPT

## 2025-07-28 PROCEDURE — 80053 COMPREHEN METABOLIC PANEL: CPT

## 2025-07-28 PROCEDURE — 84300 ASSAY OF URINE SODIUM: CPT

## 2025-07-28 PROCEDURE — 81001 URINALYSIS AUTO W/SCOPE: CPT

## 2025-07-28 PROCEDURE — 83690 ASSAY OF LIPASE: CPT

## 2025-07-28 PROCEDURE — 76376 3D RENDER W/INTRP POSTPROCES: CPT

## 2025-07-28 PROCEDURE — 82607 VITAMIN B-12: CPT

## 2025-07-28 PROCEDURE — 83540 ASSAY OF IRON: CPT

## 2025-07-28 PROCEDURE — 99232 SBSQ HOSP IP/OBS MODERATE 35: CPT

## 2025-07-28 PROCEDURE — 85045 AUTOMATED RETICULOCYTE COUNT: CPT

## 2025-07-28 PROCEDURE — 83935 ASSAY OF URINE OSMOLALITY: CPT

## 2025-07-28 PROCEDURE — 83036 HEMOGLOBIN GLYCOSYLATED A1C: CPT

## 2025-07-28 PROCEDURE — 84550 ASSAY OF BLOOD/URIC ACID: CPT

## 2025-07-28 PROCEDURE — 86850 RBC ANTIBODY SCREEN: CPT

## 2025-07-28 PROCEDURE — 93306 TTE W/DOPPLER COMPLETE: CPT | Mod: 26

## 2025-07-28 PROCEDURE — 87040 BLOOD CULTURE FOR BACTERIA: CPT

## 2025-07-28 PROCEDURE — 83605 ASSAY OF LACTIC ACID: CPT

## 2025-07-28 PROCEDURE — 84100 ASSAY OF PHOSPHORUS: CPT

## 2025-07-28 PROCEDURE — 73200 CT UPPER EXTREMITY W/O DYE: CPT | Mod: 26,RT

## 2025-07-28 PROCEDURE — 73200 CT UPPER EXTREMITY W/O DYE: CPT

## 2025-07-28 PROCEDURE — 85610 PROTHROMBIN TIME: CPT

## 2025-07-28 PROCEDURE — 36415 COLL VENOUS BLD VENIPUNCTURE: CPT

## 2025-07-28 PROCEDURE — 93005 ELECTROCARDIOGRAM TRACING: CPT

## 2025-07-28 PROCEDURE — 74176 CT ABD & PELVIS W/O CONTRAST: CPT

## 2025-07-28 PROCEDURE — 83550 IRON BINDING TEST: CPT

## 2025-07-28 PROCEDURE — 83735 ASSAY OF MAGNESIUM: CPT

## 2025-07-28 PROCEDURE — 86901 BLOOD TYPING SEROLOGIC RH(D): CPT

## 2025-07-28 PROCEDURE — 84443 ASSAY THYROID STIM HORMONE: CPT

## 2025-07-28 PROCEDURE — 83880 ASSAY OF NATRIURETIC PEPTIDE: CPT

## 2025-07-28 PROCEDURE — 85730 THROMBOPLASTIN TIME PARTIAL: CPT

## 2025-07-28 PROCEDURE — 76376 3D RENDER W/INTRP POSTPROCES: CPT | Mod: 26

## 2025-07-28 PROCEDURE — 85027 COMPLETE CBC AUTOMATED: CPT

## 2025-07-28 PROCEDURE — P9047: CPT

## 2025-07-28 PROCEDURE — 84466 ASSAY OF TRANSFERRIN: CPT

## 2025-07-28 PROCEDURE — 86923 COMPATIBILITY TEST ELECTRIC: CPT

## 2025-07-28 RX ORDER — POLYETHYLENE GLYCOL 3350 17 G/17G
17 POWDER, FOR SOLUTION ORAL ONCE
Refills: 0 | Status: COMPLETED | OUTPATIENT
Start: 2025-07-28 | End: 2025-07-28

## 2025-07-28 RX ORDER — POLYETHYLENE GLYCOL 3350 17 G/17G
17 POWDER, FOR SOLUTION ORAL
Refills: 0 | Status: DISCONTINUED | OUTPATIENT
Start: 2025-07-28 | End: 2025-07-29

## 2025-07-28 RX ORDER — FOLIC ACID 1 MG/1
1 TABLET ORAL DAILY
Refills: 0 | Status: DISCONTINUED | OUTPATIENT
Start: 2025-07-28 | End: 2025-08-08

## 2025-07-28 RX ORDER — ALBUMIN (HUMAN) 12.5 G/50ML
50 INJECTION, SOLUTION INTRAVENOUS ONCE
Refills: 0 | Status: DISCONTINUED | OUTPATIENT
Start: 2025-07-28 | End: 2025-08-01

## 2025-07-28 RX ADMIN — Medication 1 GRAM(S): at 18:33

## 2025-07-28 RX ADMIN — Medication 15 MILLILITER(S): at 14:52

## 2025-07-28 RX ADMIN — LIDOCAINE HYDROCHLORIDE 1 PATCH: 20 JELLY TOPICAL at 03:04

## 2025-07-28 RX ADMIN — Medication 1 GRAM(S): at 14:53

## 2025-07-28 RX ADMIN — CEFEPIME 100 MILLIGRAM(S): 2 INJECTION, POWDER, FOR SOLUTION INTRAVENOUS at 21:52

## 2025-07-28 RX ADMIN — Medication 1 GRAM(S): at 00:29

## 2025-07-28 RX ADMIN — Medication 40 MILLIGRAM(S): at 05:57

## 2025-07-28 RX ADMIN — POLYETHYLENE GLYCOL 3350 17 GRAM(S): 17 POWDER, FOR SOLUTION ORAL at 14:53

## 2025-07-28 RX ADMIN — Medication 1 GRAM(S): at 05:57

## 2025-07-28 RX ADMIN — ALBUMIN (HUMAN) 50 MILLILITER(S): 12.5 INJECTION, SOLUTION INTRAVENOUS at 05:56

## 2025-07-28 RX ADMIN — Medication 100 MILLIGRAM(S): at 05:57

## 2025-07-28 RX ADMIN — Medication 40 MILLIGRAM(S): at 21:52

## 2025-07-28 RX ADMIN — Medication 650 MILLIGRAM(S): at 21:53

## 2025-07-28 RX ADMIN — METOPROLOL SUCCINATE 25 MILLIGRAM(S): 50 TABLET, EXTENDED RELEASE ORAL at 05:57

## 2025-07-28 RX ADMIN — LIDOCAINE HYDROCHLORIDE 1 PATCH: 20 JELLY TOPICAL at 04:39

## 2025-07-28 RX ADMIN — FOLIC ACID 1 MILLIGRAM(S): 1 TABLET ORAL at 21:52

## 2025-07-29 DIAGNOSIS — Z51.5 ENCOUNTER FOR PALLIATIVE CARE: ICD-10-CM

## 2025-07-29 DIAGNOSIS — N18.4 CHRONIC KIDNEY DISEASE, STAGE 4 (SEVERE): ICD-10-CM

## 2025-07-29 DIAGNOSIS — Z71.89 OTHER SPECIFIED COUNSELING: ICD-10-CM

## 2025-07-29 LAB
AFP-TM SERPL-MCNC: <1.8 NG/ML — SIGNIFICANT CHANGE UP
ANION GAP SERPL CALC-SCNC: 8 MMOL/L — SIGNIFICANT CHANGE UP (ref 5–17)
BUN SERPL-MCNC: 104 MG/DL — HIGH (ref 7–23)
CALCIUM SERPL-MCNC: 7.8 MG/DL — LOW (ref 8.5–10.1)
CHLORIDE SERPL-SCNC: 115 MMOL/L — HIGH (ref 96–108)
CO2 SERPL-SCNC: 20 MMOL/L — LOW (ref 22–31)
CREAT SERPL-MCNC: 4.2 MG/DL — HIGH (ref 0.5–1.3)
CRP SERPL-MCNC: 26 MG/L — HIGH
EGFR: 9 ML/MIN/1.73M2 — LOW
EGFR: 9 ML/MIN/1.73M2 — LOW
ERYTHROCYTE [SEDIMENTATION RATE] IN BLOOD: 16 MM/HR — SIGNIFICANT CHANGE UP (ref 0–20)
GLUCOSE SERPL-MCNC: 142 MG/DL — HIGH (ref 70–99)
HBV CORE AB SER-ACNC: SIGNIFICANT CHANGE UP
HBV DNA # SERPL NAA+PROBE: SIGNIFICANT CHANGE UP
HBV DNA # SERPL NAA+PROBE: SIGNIFICANT CHANGE UP IU/ML
HBV DNA SERPL NAA+PROBE-LOG#: SIGNIFICANT CHANGE UP LOGIU/ML
HBV E AB SER-ACNC: SIGNIFICANT CHANGE UP
HBV E AG SER-ACNC: SIGNIFICANT CHANGE UP
HBV SURFACE AB SER-ACNC: ABNORMAL
HBV SURFACE AG SER-ACNC: SIGNIFICANT CHANGE UP
HCT VFR BLD CALC: 25.4 % — LOW (ref 34.5–45)
HCT VFR BLD CALC: 27.4 % — LOW (ref 34.5–45)
HCV AB S/CO SERPL IA: 0.7 S/CO — SIGNIFICANT CHANGE UP (ref 0–0.79)
HCV AB SERPL-IMP: SIGNIFICANT CHANGE UP
HGB BLD-MCNC: 8.5 G/DL — LOW (ref 11.5–15.5)
HGB BLD-MCNC: 8.9 G/DL — LOW (ref 11.5–15.5)
IGA FLD-MCNC: 148 MG/DL — SIGNIFICANT CHANGE UP (ref 84–499)
IGG FLD-MCNC: 600 MG/DL — LOW (ref 610–1660)
IGM SERPL-MCNC: 63 MG/DL — SIGNIFICANT CHANGE UP (ref 35–242)
KAPPA LC SER QL IFE: 15.72 MG/DL — HIGH (ref 0.33–1.94)
KAPPA/LAMBDA FREE LIGHT CHAIN RATIO, SERUM: 4.42 RATIO — HIGH (ref 0.26–1.65)
LAMBDA LC SER QL IFE: 3.56 MG/DL — HIGH (ref 0.57–2.63)
MAGNESIUM SERPL-MCNC: 2.2 MG/DL — SIGNIFICANT CHANGE UP (ref 1.6–2.6)
MCHC RBC-ENTMCNC: 28.9 PG — SIGNIFICANT CHANGE UP (ref 27–34)
MCHC RBC-ENTMCNC: 29.6 PG — SIGNIFICANT CHANGE UP (ref 27–34)
MCHC RBC-ENTMCNC: 32.5 G/DL — SIGNIFICANT CHANGE UP (ref 32–36)
MCHC RBC-ENTMCNC: 33.5 G/DL — SIGNIFICANT CHANGE UP (ref 32–36)
MCV RBC AUTO: 88.5 FL — SIGNIFICANT CHANGE UP (ref 80–100)
MCV RBC AUTO: 89 FL — SIGNIFICANT CHANGE UP (ref 80–100)
NRBC # BLD AUTO: 0 K/UL — SIGNIFICANT CHANGE UP (ref 0–0)
NRBC # BLD AUTO: 0 K/UL — SIGNIFICANT CHANGE UP (ref 0–0)
NRBC # FLD: 0 K/UL — SIGNIFICANT CHANGE UP (ref 0–0)
NRBC # FLD: 0 K/UL — SIGNIFICANT CHANGE UP (ref 0–0)
NRBC BLD AUTO-RTO: 0 /100 WBCS — SIGNIFICANT CHANGE UP (ref 0–0)
NRBC BLD AUTO-RTO: 0 /100 WBCS — SIGNIFICANT CHANGE UP (ref 0–0)
OB PNL STL: POSITIVE
PHOSPHATE SERPL-MCNC: 4.6 MG/DL — HIGH (ref 2.5–4.5)
PLATELET # BLD AUTO: 103 K/UL — LOW (ref 150–400)
PLATELET # BLD AUTO: 116 K/UL — LOW (ref 150–400)
PMV BLD: 11.5 FL — SIGNIFICANT CHANGE UP (ref 7–13)
PMV BLD: 11.6 FL — SIGNIFICANT CHANGE UP (ref 7–13)
POTASSIUM SERPL-MCNC: 4.2 MMOL/L — SIGNIFICANT CHANGE UP (ref 3.5–5.3)
POTASSIUM SERPL-SCNC: 4.2 MMOL/L — SIGNIFICANT CHANGE UP (ref 3.5–5.3)
PROT SERPL-MCNC: 4.4 G/DL — LOW (ref 6–8.3)
RBC # BLD: 2.87 M/UL — LOW (ref 3.8–5.2)
RBC # BLD: 3.08 M/UL — LOW (ref 3.8–5.2)
RBC # FLD: 16.9 % — HIGH (ref 10.3–14.5)
RBC # FLD: 17.4 % — HIGH (ref 10.3–14.5)
SODIUM SERPL-SCNC: 143 MMOL/L — SIGNIFICANT CHANGE UP (ref 135–145)
WBC # BLD: 15.24 K/UL — HIGH (ref 3.8–10.5)
WBC # BLD: 16.45 K/UL — HIGH (ref 3.8–10.5)
WBC # FLD AUTO: 15.24 K/UL — HIGH (ref 3.8–10.5)
WBC # FLD AUTO: 16.45 K/UL — HIGH (ref 3.8–10.5)

## 2025-07-29 PROCEDURE — 99233 SBSQ HOSP IP/OBS HIGH 50: CPT

## 2025-07-29 PROCEDURE — 99223 1ST HOSP IP/OBS HIGH 75: CPT

## 2025-07-29 PROCEDURE — 99497 ADVNCD CARE PLAN 30 MIN: CPT | Mod: 25

## 2025-07-29 RX ORDER — POLYETHYLENE GLYCOL 3350 17 G/17G
17 POWDER, FOR SOLUTION ORAL DAILY
Refills: 0 | Status: DISCONTINUED | OUTPATIENT
Start: 2025-07-29 | End: 2025-08-02

## 2025-07-29 RX ADMIN — POLYETHYLENE GLYCOL 3350 17 GRAM(S): 17 POWDER, FOR SOLUTION ORAL at 06:33

## 2025-07-29 RX ADMIN — Medication 40 MILLIGRAM(S): at 18:37

## 2025-07-29 RX ADMIN — Medication 1 GRAM(S): at 18:37

## 2025-07-29 RX ADMIN — Medication 15 MILLILITER(S): at 12:43

## 2025-07-29 RX ADMIN — Medication 1 GRAM(S): at 12:42

## 2025-07-29 RX ADMIN — FOLIC ACID 1 MILLIGRAM(S): 1 TABLET ORAL at 12:43

## 2025-07-29 RX ADMIN — Medication 1 GRAM(S): at 06:33

## 2025-07-29 RX ADMIN — METOPROLOL SUCCINATE 25 MILLIGRAM(S): 50 TABLET, EXTENDED RELEASE ORAL at 06:33

## 2025-07-29 RX ADMIN — Medication 1 GRAM(S): at 23:11

## 2025-07-29 RX ADMIN — Medication 40 MILLIGRAM(S): at 06:33

## 2025-07-29 RX ADMIN — CEFEPIME 100 MILLIGRAM(S): 2 INJECTION, POWDER, FOR SOLUTION INTRAVENOUS at 18:36

## 2025-07-30 LAB
% ALBUMIN: 61.9 % — SIGNIFICANT CHANGE UP
% ALPHA 1: 6.3 % — SIGNIFICANT CHANGE UP
% ALPHA 2: 8.8 % — SIGNIFICANT CHANGE UP
% BETA: 7.9 % — SIGNIFICANT CHANGE UP
% GAMMA: 15.1 % — SIGNIFICANT CHANGE UP
% M SPIKE: 8.9 % — SIGNIFICANT CHANGE UP
ALBUMIN SERPL ELPH-MCNC: 2.7 G/DL — LOW (ref 3.6–5.5)
ALBUMIN/GLOB SERPL ELPH: 1.6 RATIO — SIGNIFICANT CHANGE UP
ALPHA1 GLOB SERPL ELPH-MCNC: 0.3 G/DL — SIGNIFICANT CHANGE UP (ref 0.1–0.4)
ALPHA2 GLOB SERPL ELPH-MCNC: 0.4 G/DL — LOW (ref 0.5–1)
ANION GAP SERPL CALC-SCNC: 6 MMOL/L — SIGNIFICANT CHANGE UP (ref 5–17)
B-GLOBULIN SERPL ELPH-MCNC: 0.3 G/DL — LOW (ref 0.5–1)
BUN SERPL-MCNC: 84 MG/DL — HIGH (ref 7–23)
CALCIUM SERPL-MCNC: 7.7 MG/DL — LOW (ref 8.5–10.1)
CHLORIDE SERPL-SCNC: 117 MMOL/L — HIGH (ref 96–108)
CO2 SERPL-SCNC: 21 MMOL/L — LOW (ref 22–31)
CREAT SERPL-MCNC: 4.1 MG/DL — HIGH (ref 0.5–1.3)
EGFR: 10 ML/MIN/1.73M2 — LOW
EGFR: 10 ML/MIN/1.73M2 — LOW
EPEC DNA STL QL NAA+PROBE: DETECTED
GAMMA GLOBULIN: 0.7 G/DL — SIGNIFICANT CHANGE UP (ref 0.6–1.6)
GI PCR PANEL: DETECTED
GLUCOSE SERPL-MCNC: 142 MG/DL — HIGH (ref 70–99)
HCT VFR BLD CALC: 26.6 % — LOW (ref 34.5–45)
HGB BLD-MCNC: 9.1 G/DL — LOW (ref 11.5–15.5)
IMMATURE PLATELET FRACTION #: 4.6 K/UL — LOW (ref 4.7–11.1)
IMMATURE PLATELET FRACTION %: 5 % — HIGH (ref 1.6–4.9)
INTERPRETATION SERPL IFE-IMP: SIGNIFICANT CHANGE UP
M-SPIKE: 0.4 G/DL — HIGH (ref 0–0)
MAGNESIUM SERPL-MCNC: 1.9 MG/DL — SIGNIFICANT CHANGE UP (ref 1.6–2.6)
MCHC RBC-ENTMCNC: 30.2 PG — SIGNIFICANT CHANGE UP (ref 27–34)
MCHC RBC-ENTMCNC: 34.2 G/DL — SIGNIFICANT CHANGE UP (ref 32–36)
MCV RBC AUTO: 88.4 FL — SIGNIFICANT CHANGE UP (ref 80–100)
NRBC # BLD AUTO: 0 K/UL — SIGNIFICANT CHANGE UP (ref 0–0)
NRBC # FLD: 0 K/UL — SIGNIFICANT CHANGE UP (ref 0–0)
NRBC BLD AUTO-RTO: 0 /100 WBCS — SIGNIFICANT CHANGE UP (ref 0–0)
PHOSPHATE SERPL-MCNC: 4.1 MG/DL — SIGNIFICANT CHANGE UP (ref 2.5–4.5)
PLATELET # BLD AUTO: 92 K/UL — LOW (ref 150–400)
PMV BLD: 12.2 FL — SIGNIFICANT CHANGE UP (ref 7–13)
POTASSIUM SERPL-MCNC: 4.2 MMOL/L — SIGNIFICANT CHANGE UP (ref 3.5–5.3)
POTASSIUM SERPL-SCNC: 4.2 MMOL/L — SIGNIFICANT CHANGE UP (ref 3.5–5.3)
PROT PATTERN SERPL ELPH-IMP: SIGNIFICANT CHANGE UP
PROT SERPL-MCNC: 4.4 G/DL — LOW (ref 6–8.3)
RBC # BLD: 3.01 M/UL — LOW (ref 3.8–5.2)
RBC # FLD: 17.8 % — HIGH (ref 10.3–14.5)
SODIUM SERPL-SCNC: 144 MMOL/L — SIGNIFICANT CHANGE UP (ref 135–145)
WBC # BLD: 14.03 K/UL — HIGH (ref 3.8–10.5)
WBC # FLD AUTO: 14.03 K/UL — HIGH (ref 3.8–10.5)

## 2025-07-30 PROCEDURE — 83690 ASSAY OF LIPASE: CPT

## 2025-07-30 PROCEDURE — 83036 HEMOGLOBIN GLYCOSYLATED A1C: CPT

## 2025-07-30 PROCEDURE — 83605 ASSAY OF LACTIC ACID: CPT

## 2025-07-30 PROCEDURE — 87350 HEPATITIS BE AG IA: CPT

## 2025-07-30 PROCEDURE — 86707 HEPATITIS BE ANTIBODY: CPT

## 2025-07-30 PROCEDURE — 84443 ASSAY THYROID STIM HORMONE: CPT

## 2025-07-30 PROCEDURE — 83935 ASSAY OF URINE OSMOLALITY: CPT

## 2025-07-30 PROCEDURE — 86900 BLOOD TYPING SEROLOGIC ABO: CPT

## 2025-07-30 PROCEDURE — 86334 IMMUNOFIX E-PHORESIS SERUM: CPT

## 2025-07-30 PROCEDURE — 83735 ASSAY OF MAGNESIUM: CPT

## 2025-07-30 PROCEDURE — 82607 VITAMIN B-12: CPT

## 2025-07-30 PROCEDURE — 86901 BLOOD TYPING SEROLOGIC RH(D): CPT

## 2025-07-30 PROCEDURE — 84100 ASSAY OF PHOSPHORUS: CPT

## 2025-07-30 PROCEDURE — 80053 COMPREHEN METABOLIC PANEL: CPT

## 2025-07-30 PROCEDURE — 93306 TTE W/DOPPLER COMPLETE: CPT

## 2025-07-30 PROCEDURE — 84155 ASSAY OF PROTEIN SERUM: CPT

## 2025-07-30 PROCEDURE — 86803 HEPATITIS C AB TEST: CPT

## 2025-07-30 PROCEDURE — 71045 X-RAY EXAM CHEST 1 VIEW: CPT

## 2025-07-30 PROCEDURE — 85652 RBC SED RATE AUTOMATED: CPT

## 2025-07-30 PROCEDURE — 82784 ASSAY IGA/IGD/IGG/IGM EACH: CPT

## 2025-07-30 PROCEDURE — 82728 ASSAY OF FERRITIN: CPT

## 2025-07-30 PROCEDURE — 36415 COLL VENOUS BLD VENIPUNCTURE: CPT

## 2025-07-30 PROCEDURE — 85730 THROMBOPLASTIN TIME PARTIAL: CPT

## 2025-07-30 PROCEDURE — 87517 HEPATITIS B DNA QUANT: CPT

## 2025-07-30 PROCEDURE — 76705 ECHO EXAM OF ABDOMEN: CPT

## 2025-07-30 PROCEDURE — 84550 ASSAY OF BLOOD/URIC ACID: CPT

## 2025-07-30 PROCEDURE — 86706 HEP B SURFACE ANTIBODY: CPT

## 2025-07-30 PROCEDURE — P9047: CPT

## 2025-07-30 PROCEDURE — 82746 ASSAY OF FOLIC ACID SERUM: CPT

## 2025-07-30 PROCEDURE — 99233 SBSQ HOSP IP/OBS HIGH 50: CPT

## 2025-07-30 PROCEDURE — 86140 C-REACTIVE PROTEIN: CPT

## 2025-07-30 PROCEDURE — 86704 HEP B CORE ANTIBODY TOTAL: CPT

## 2025-07-30 PROCEDURE — 84466 ASSAY OF TRANSFERRIN: CPT

## 2025-07-30 PROCEDURE — 81001 URINALYSIS AUTO W/SCOPE: CPT

## 2025-07-30 PROCEDURE — 74176 CT ABD & PELVIS W/O CONTRAST: CPT

## 2025-07-30 PROCEDURE — 87340 HEPATITIS B SURFACE AG IA: CPT

## 2025-07-30 PROCEDURE — 85027 COMPLETE CBC AUTOMATED: CPT

## 2025-07-30 PROCEDURE — 76376 3D RENDER W/INTRP POSTPROCES: CPT

## 2025-07-30 PROCEDURE — 83880 ASSAY OF NATRIURETIC PEPTIDE: CPT

## 2025-07-30 PROCEDURE — 93970 EXTREMITY STUDY: CPT

## 2025-07-30 PROCEDURE — 85025 COMPLETE CBC W/AUTO DIFF WBC: CPT

## 2025-07-30 PROCEDURE — 93005 ELECTROCARDIOGRAM TRACING: CPT

## 2025-07-30 PROCEDURE — 87040 BLOOD CULTURE FOR BACTERIA: CPT

## 2025-07-30 PROCEDURE — 83540 ASSAY OF IRON: CPT

## 2025-07-30 PROCEDURE — 83521 IG LIGHT CHAINS FREE EACH: CPT

## 2025-07-30 PROCEDURE — 80048 BASIC METABOLIC PNL TOTAL CA: CPT

## 2025-07-30 PROCEDURE — 82272 OCCULT BLD FECES 1-3 TESTS: CPT

## 2025-07-30 PROCEDURE — 84439 ASSAY OF FREE THYROXINE: CPT

## 2025-07-30 PROCEDURE — 97162 PT EVAL MOD COMPLEX 30 MIN: CPT

## 2025-07-30 PROCEDURE — 85045 AUTOMATED RETICULOCYTE COUNT: CPT

## 2025-07-30 PROCEDURE — 73200 CT UPPER EXTREMITY W/O DYE: CPT

## 2025-07-30 PROCEDURE — 83550 IRON BINDING TEST: CPT

## 2025-07-30 PROCEDURE — 36430 TRANSFUSION BLD/BLD COMPNT: CPT

## 2025-07-30 PROCEDURE — 84300 ASSAY OF URINE SODIUM: CPT

## 2025-07-30 PROCEDURE — 84165 PROTEIN E-PHORESIS SERUM: CPT

## 2025-07-30 PROCEDURE — 87507 IADNA-DNA/RNA PROBE TQ 12-25: CPT

## 2025-07-30 PROCEDURE — P9016: CPT

## 2025-07-30 PROCEDURE — 82105 ALPHA-FETOPROTEIN SERUM: CPT

## 2025-07-30 PROCEDURE — 86923 COMPATIBILITY TEST ELECTRIC: CPT

## 2025-07-30 PROCEDURE — 85610 PROTHROMBIN TIME: CPT

## 2025-07-30 PROCEDURE — 80061 LIPID PANEL: CPT

## 2025-07-30 PROCEDURE — 86850 RBC ANTIBODY SCREEN: CPT

## 2025-07-30 RX ORDER — AZITHROMYCIN 250 MG
500 CAPSULE ORAL EVERY 24 HOURS
Refills: 0 | Status: COMPLETED | OUTPATIENT
Start: 2025-07-30 | End: 2025-08-01

## 2025-07-30 RX ORDER — ACETAMINOPHEN 500 MG/5ML
1000 LIQUID (ML) ORAL ONCE
Refills: 0 | Status: COMPLETED | OUTPATIENT
Start: 2025-07-30 | End: 2025-07-30

## 2025-07-30 RX ORDER — ACETAMINOPHEN 500 MG/5ML
700 LIQUID (ML) ORAL ONCE
Refills: 0 | Status: DISCONTINUED | OUTPATIENT
Start: 2025-07-30 | End: 2025-07-30

## 2025-07-30 RX ADMIN — Medication 1 GRAM(S): at 18:24

## 2025-07-30 RX ADMIN — Medication 1 GRAM(S): at 05:30

## 2025-07-30 RX ADMIN — Medication 40 MILLIGRAM(S): at 18:24

## 2025-07-30 RX ADMIN — Medication 1 GRAM(S): at 21:30

## 2025-07-30 RX ADMIN — Medication 1000 MILLIGRAM(S): at 13:34

## 2025-07-30 RX ADMIN — METOPROLOL SUCCINATE 25 MILLIGRAM(S): 50 TABLET, EXTENDED RELEASE ORAL at 05:30

## 2025-07-30 RX ADMIN — FOLIC ACID 1 MILLIGRAM(S): 1 TABLET ORAL at 12:32

## 2025-07-30 RX ADMIN — Medication 1 GRAM(S): at 12:32

## 2025-07-30 RX ADMIN — Medication 400 MILLIGRAM(S): at 13:15

## 2025-07-30 RX ADMIN — Medication 40 MILLIGRAM(S): at 05:34

## 2025-07-30 RX ADMIN — Medication 500 MILLIGRAM(S): at 12:32

## 2025-07-30 RX ADMIN — Medication 15 MILLILITER(S): at 12:33

## 2025-07-30 RX ADMIN — CEFEPIME 100 MILLIGRAM(S): 2 INJECTION, POWDER, FOR SOLUTION INTRAVENOUS at 18:23

## 2025-07-31 LAB
ANION GAP SERPL CALC-SCNC: 8 MMOL/L — SIGNIFICANT CHANGE UP (ref 5–17)
BASOPHILS # BLD AUTO: 0.04 K/UL — SIGNIFICANT CHANGE UP (ref 0–0.2)
BASOPHILS NFR BLD AUTO: 0.3 % — SIGNIFICANT CHANGE UP (ref 0–2)
BUN SERPL-MCNC: 79 MG/DL — HIGH (ref 7–23)
CALCIUM SERPL-MCNC: 8 MG/DL — LOW (ref 8.5–10.1)
CHLORIDE SERPL-SCNC: 115 MMOL/L — HIGH (ref 96–108)
CO2 SERPL-SCNC: 20 MMOL/L — LOW (ref 22–31)
CREAT SERPL-MCNC: 4 MG/DL — HIGH (ref 0.5–1.3)
CULTURE RESULTS: SIGNIFICANT CHANGE UP
CULTURE RESULTS: SIGNIFICANT CHANGE UP
EGFR: 10 ML/MIN/1.73M2 — LOW
EGFR: 10 ML/MIN/1.73M2 — LOW
EOSINOPHIL # BLD AUTO: 0.94 K/UL — HIGH (ref 0–0.5)
EOSINOPHIL NFR BLD AUTO: 6.5 % — HIGH (ref 0–6)
GLUCOSE SERPL-MCNC: 132 MG/DL — HIGH (ref 70–99)
HCT VFR BLD CALC: 27.4 % — LOW (ref 34.5–45)
HGB BLD-MCNC: 9.2 G/DL — LOW (ref 11.5–15.5)
IMM GRANULOCYTES # BLD AUTO: 0.18 K/UL — HIGH (ref 0–0.07)
IMM GRANULOCYTES NFR BLD AUTO: 1.2 % — HIGH (ref 0–0.9)
LYMPHOCYTES # BLD AUTO: 1.87 K/UL — SIGNIFICANT CHANGE UP (ref 1–3.3)
LYMPHOCYTES NFR BLD AUTO: 12.9 % — LOW (ref 13–44)
MCHC RBC-ENTMCNC: 30 PG — SIGNIFICANT CHANGE UP (ref 27–34)
MCHC RBC-ENTMCNC: 33.6 G/DL — SIGNIFICANT CHANGE UP (ref 32–36)
MCV RBC AUTO: 89.3 FL — SIGNIFICANT CHANGE UP (ref 80–100)
MONOCYTES # BLD AUTO: 0.76 K/UL — SIGNIFICANT CHANGE UP (ref 0–0.9)
MONOCYTES NFR BLD AUTO: 5.2 % — SIGNIFICANT CHANGE UP (ref 2–14)
NEUTROPHILS # BLD AUTO: 10.71 K/UL — HIGH (ref 1.8–7.4)
NEUTROPHILS NFR BLD AUTO: 73.9 % — SIGNIFICANT CHANGE UP (ref 43–77)
NRBC # BLD AUTO: 0 K/UL — SIGNIFICANT CHANGE UP (ref 0–0)
NRBC # FLD: 0 K/UL — SIGNIFICANT CHANGE UP (ref 0–0)
NRBC BLD AUTO-RTO: 0 /100 WBCS — SIGNIFICANT CHANGE UP (ref 0–0)
PLATELET # BLD AUTO: 113 K/UL — LOW (ref 150–400)
PMV BLD: 11.5 FL — SIGNIFICANT CHANGE UP (ref 7–13)
POTASSIUM SERPL-MCNC: 4.2 MMOL/L — SIGNIFICANT CHANGE UP (ref 3.5–5.3)
POTASSIUM SERPL-SCNC: 4.2 MMOL/L — SIGNIFICANT CHANGE UP (ref 3.5–5.3)
RBC # BLD: 3.07 M/UL — LOW (ref 3.8–5.2)
RBC # FLD: 18.7 % — HIGH (ref 10.3–14.5)
SODIUM SERPL-SCNC: 143 MMOL/L — SIGNIFICANT CHANGE UP (ref 135–145)
SPECIMEN SOURCE: SIGNIFICANT CHANGE UP
SPECIMEN SOURCE: SIGNIFICANT CHANGE UP
WBC # BLD: 14.5 K/UL — HIGH (ref 3.8–10.5)
WBC # FLD AUTO: 14.5 K/UL — HIGH (ref 3.8–10.5)

## 2025-07-31 PROCEDURE — 99233 SBSQ HOSP IP/OBS HIGH 50: CPT

## 2025-07-31 RX ORDER — AMLODIPINE BESYLATE 10 MG/1
10 TABLET ORAL DAILY
Refills: 0 | Status: DISCONTINUED | OUTPATIENT
Start: 2025-08-01 | End: 2025-08-08

## 2025-07-31 RX ADMIN — METOPROLOL SUCCINATE 25 MILLIGRAM(S): 50 TABLET, EXTENDED RELEASE ORAL at 05:12

## 2025-07-31 RX ADMIN — Medication 15 MILLILITER(S): at 13:10

## 2025-07-31 RX ADMIN — Medication 1 GRAM(S): at 19:18

## 2025-07-31 RX ADMIN — Medication 40 MILLIGRAM(S): at 05:12

## 2025-07-31 RX ADMIN — FOLIC ACID 1 MILLIGRAM(S): 1 TABLET ORAL at 13:10

## 2025-07-31 RX ADMIN — Medication 1 GRAM(S): at 05:12

## 2025-07-31 RX ADMIN — Medication 500 MILLIGRAM(S): at 08:42

## 2025-07-31 RX ADMIN — CEFEPIME 100 MILLIGRAM(S): 2 INJECTION, POWDER, FOR SOLUTION INTRAVENOUS at 19:22

## 2025-07-31 RX ADMIN — Medication 1 GRAM(S): at 13:10

## 2025-07-31 RX ADMIN — Medication 1 GRAM(S): at 23:46

## 2025-07-31 RX ADMIN — Medication 40 MILLIGRAM(S): at 19:18

## 2025-08-01 ENCOUNTER — TRANSCRIPTION ENCOUNTER (OUTPATIENT)
Age: 89
End: 2025-08-01

## 2025-08-01 LAB
ALBUMIN SERPL ELPH-MCNC: 2 G/DL — LOW (ref 3.3–5)
ALP SERPL-CCNC: 915 U/L — HIGH (ref 40–120)
ALT FLD-CCNC: 102 U/L — HIGH (ref 12–78)
ANION GAP SERPL CALC-SCNC: 9 MMOL/L — SIGNIFICANT CHANGE UP (ref 5–17)
AST SERPL-CCNC: 81 U/L — HIGH (ref 15–37)
BASOPHILS # BLD AUTO: 0.02 K/UL — SIGNIFICANT CHANGE UP (ref 0–0.2)
BASOPHILS NFR BLD AUTO: 0.1 % — SIGNIFICANT CHANGE UP (ref 0–2)
BILIRUB SERPL-MCNC: 1.4 MG/DL — HIGH (ref 0.2–1.2)
BUN SERPL-MCNC: 69 MG/DL — HIGH (ref 7–23)
CALCIUM SERPL-MCNC: 8 MG/DL — LOW (ref 8.5–10.1)
CHLORIDE SERPL-SCNC: 115 MMOL/L — HIGH (ref 96–108)
CO2 SERPL-SCNC: 19 MMOL/L — LOW (ref 22–31)
CREAT SERPL-MCNC: 3.9 MG/DL — HIGH (ref 0.5–1.3)
EGFR: 10 ML/MIN/1.73M2 — LOW
EGFR: 10 ML/MIN/1.73M2 — LOW
EOSINOPHIL # BLD AUTO: 0.86 K/UL — HIGH (ref 0–0.5)
EOSINOPHIL NFR BLD AUTO: 6 % — SIGNIFICANT CHANGE UP (ref 0–6)
GLUCOSE SERPL-MCNC: 169 MG/DL — HIGH (ref 70–99)
HCT VFR BLD CALC: 28.3 % — LOW (ref 34.5–45)
HGB BLD-MCNC: 8.9 G/DL — LOW (ref 11.5–15.5)
IMM GRANULOCYTES # BLD AUTO: 0.18 K/UL — HIGH (ref 0–0.07)
IMM GRANULOCYTES NFR BLD AUTO: 1.2 % — HIGH (ref 0–0.9)
LYMPHOCYTES # BLD AUTO: 1.76 K/UL — SIGNIFICANT CHANGE UP (ref 1–3.3)
LYMPHOCYTES NFR BLD AUTO: 12.2 % — LOW (ref 13–44)
MCHC RBC-ENTMCNC: 29 PG — SIGNIFICANT CHANGE UP (ref 27–34)
MCHC RBC-ENTMCNC: 31.4 G/DL — LOW (ref 32–36)
MCV RBC AUTO: 92.2 FL — SIGNIFICANT CHANGE UP (ref 80–100)
MONOCYTES # BLD AUTO: 0.88 K/UL — SIGNIFICANT CHANGE UP (ref 0–0.9)
MONOCYTES NFR BLD AUTO: 6.1 % — SIGNIFICANT CHANGE UP (ref 2–14)
NEUTROPHILS # BLD AUTO: 10.73 K/UL — HIGH (ref 1.8–7.4)
NEUTROPHILS NFR BLD AUTO: 74.4 % — SIGNIFICANT CHANGE UP (ref 43–77)
NRBC # BLD AUTO: 0 K/UL — SIGNIFICANT CHANGE UP (ref 0–0)
NRBC # FLD: 0 K/UL — SIGNIFICANT CHANGE UP (ref 0–0)
NRBC BLD AUTO-RTO: 0 /100 WBCS — SIGNIFICANT CHANGE UP (ref 0–0)
PLATELET # BLD AUTO: 120 K/UL — LOW (ref 150–400)
PMV BLD: 11.2 FL — SIGNIFICANT CHANGE UP (ref 7–13)
POTASSIUM SERPL-MCNC: 4 MMOL/L — SIGNIFICANT CHANGE UP (ref 3.5–5.3)
POTASSIUM SERPL-SCNC: 4 MMOL/L — SIGNIFICANT CHANGE UP (ref 3.5–5.3)
PROT SERPL-MCNC: 4.8 G/DL — LOW (ref 6–8.3)
RBC # BLD: 3.07 M/UL — LOW (ref 3.8–5.2)
RBC # FLD: 19 % — HIGH (ref 10.3–14.5)
SODIUM SERPL-SCNC: 143 MMOL/L — SIGNIFICANT CHANGE UP (ref 135–145)
WBC # BLD: 14.43 K/UL — HIGH (ref 3.8–10.5)
WBC # FLD AUTO: 14.43 K/UL — HIGH (ref 3.8–10.5)

## 2025-08-01 PROCEDURE — 97162 PT EVAL MOD COMPLEX 30 MIN: CPT

## 2025-08-01 PROCEDURE — 71045 X-RAY EXAM CHEST 1 VIEW: CPT

## 2025-08-01 PROCEDURE — 93970 EXTREMITY STUDY: CPT

## 2025-08-01 PROCEDURE — 86803 HEPATITIS C AB TEST: CPT

## 2025-08-01 PROCEDURE — 84550 ASSAY OF BLOOD/URIC ACID: CPT

## 2025-08-01 PROCEDURE — 87040 BLOOD CULTURE FOR BACTERIA: CPT

## 2025-08-01 PROCEDURE — 84466 ASSAY OF TRANSFERRIN: CPT

## 2025-08-01 PROCEDURE — 83880 ASSAY OF NATRIURETIC PEPTIDE: CPT

## 2025-08-01 PROCEDURE — 83935 ASSAY OF URINE OSMOLALITY: CPT

## 2025-08-01 PROCEDURE — 86707 HEPATITIS BE ANTIBODY: CPT

## 2025-08-01 PROCEDURE — 74176 CT ABD & PELVIS W/O CONTRAST: CPT

## 2025-08-01 PROCEDURE — 97535 SELF CARE MNGMENT TRAINING: CPT

## 2025-08-01 PROCEDURE — 76376 3D RENDER W/INTRP POSTPROCES: CPT

## 2025-08-01 PROCEDURE — 85652 RBC SED RATE AUTOMATED: CPT

## 2025-08-01 PROCEDURE — 84155 ASSAY OF PROTEIN SERUM: CPT

## 2025-08-01 PROCEDURE — 83540 ASSAY OF IRON: CPT

## 2025-08-01 PROCEDURE — 83690 ASSAY OF LIPASE: CPT

## 2025-08-01 PROCEDURE — 85045 AUTOMATED RETICULOCYTE COUNT: CPT

## 2025-08-01 PROCEDURE — 97116 GAIT TRAINING THERAPY: CPT

## 2025-08-01 PROCEDURE — 87350 HEPATITIS BE AG IA: CPT

## 2025-08-01 PROCEDURE — 85730 THROMBOPLASTIN TIME PARTIAL: CPT

## 2025-08-01 PROCEDURE — 84100 ASSAY OF PHOSPHORUS: CPT

## 2025-08-01 PROCEDURE — 86706 HEP B SURFACE ANTIBODY: CPT

## 2025-08-01 PROCEDURE — 87340 HEPATITIS B SURFACE AG IA: CPT

## 2025-08-01 PROCEDURE — 87507 IADNA-DNA/RNA PROBE TQ 12-25: CPT

## 2025-08-01 PROCEDURE — 82746 ASSAY OF FOLIC ACID SERUM: CPT

## 2025-08-01 PROCEDURE — 86704 HEP B CORE ANTIBODY TOTAL: CPT

## 2025-08-01 PROCEDURE — 85610 PROTHROMBIN TIME: CPT

## 2025-08-01 PROCEDURE — 86140 C-REACTIVE PROTEIN: CPT

## 2025-08-01 PROCEDURE — 82607 VITAMIN B-12: CPT

## 2025-08-01 PROCEDURE — 86850 RBC ANTIBODY SCREEN: CPT

## 2025-08-01 PROCEDURE — 76705 ECHO EXAM OF ABDOMEN: CPT

## 2025-08-01 PROCEDURE — P9016: CPT

## 2025-08-01 PROCEDURE — 84443 ASSAY THYROID STIM HORMONE: CPT

## 2025-08-01 PROCEDURE — 83735 ASSAY OF MAGNESIUM: CPT

## 2025-08-01 PROCEDURE — 81001 URINALYSIS AUTO W/SCOPE: CPT

## 2025-08-01 PROCEDURE — 82784 ASSAY IGA/IGD/IGG/IGM EACH: CPT

## 2025-08-01 PROCEDURE — 97166 OT EVAL MOD COMPLEX 45 MIN: CPT

## 2025-08-01 PROCEDURE — 83550 IRON BINDING TEST: CPT

## 2025-08-01 PROCEDURE — 97110 THERAPEUTIC EXERCISES: CPT

## 2025-08-01 PROCEDURE — 93005 ELECTROCARDIOGRAM TRACING: CPT

## 2025-08-01 PROCEDURE — 83036 HEMOGLOBIN GLYCOSYLATED A1C: CPT

## 2025-08-01 PROCEDURE — 80048 BASIC METABOLIC PNL TOTAL CA: CPT

## 2025-08-01 PROCEDURE — 87517 HEPATITIS B DNA QUANT: CPT

## 2025-08-01 PROCEDURE — 85027 COMPLETE CBC AUTOMATED: CPT

## 2025-08-01 PROCEDURE — 82272 OCCULT BLD FECES 1-3 TESTS: CPT

## 2025-08-01 PROCEDURE — 73200 CT UPPER EXTREMITY W/O DYE: CPT

## 2025-08-01 PROCEDURE — 36415 COLL VENOUS BLD VENIPUNCTURE: CPT

## 2025-08-01 PROCEDURE — 99233 SBSQ HOSP IP/OBS HIGH 50: CPT

## 2025-08-01 PROCEDURE — 82728 ASSAY OF FERRITIN: CPT

## 2025-08-01 PROCEDURE — 36430 TRANSFUSION BLD/BLD COMPNT: CPT

## 2025-08-01 PROCEDURE — 82105 ALPHA-FETOPROTEIN SERUM: CPT

## 2025-08-01 PROCEDURE — 93306 TTE W/DOPPLER COMPLETE: CPT

## 2025-08-01 PROCEDURE — 86334 IMMUNOFIX E-PHORESIS SERUM: CPT

## 2025-08-01 PROCEDURE — 84165 PROTEIN E-PHORESIS SERUM: CPT

## 2025-08-01 PROCEDURE — 83521 IG LIGHT CHAINS FREE EACH: CPT

## 2025-08-01 PROCEDURE — 86900 BLOOD TYPING SEROLOGIC ABO: CPT

## 2025-08-01 PROCEDURE — 84300 ASSAY OF URINE SODIUM: CPT

## 2025-08-01 PROCEDURE — 80053 COMPREHEN METABOLIC PANEL: CPT

## 2025-08-01 PROCEDURE — 85025 COMPLETE CBC W/AUTO DIFF WBC: CPT

## 2025-08-01 PROCEDURE — 84439 ASSAY OF FREE THYROXINE: CPT

## 2025-08-01 PROCEDURE — 86901 BLOOD TYPING SEROLOGIC RH(D): CPT

## 2025-08-01 PROCEDURE — 83605 ASSAY OF LACTIC ACID: CPT

## 2025-08-01 PROCEDURE — 80061 LIPID PANEL: CPT

## 2025-08-01 PROCEDURE — P9047: CPT

## 2025-08-01 PROCEDURE — 97530 THERAPEUTIC ACTIVITIES: CPT

## 2025-08-01 PROCEDURE — 86923 COMPATIBILITY TEST ELECTRIC: CPT

## 2025-08-01 RX ORDER — SUCRALFATE 1 G
1 TABLET ORAL
Qty: 0 | Refills: 0 | DISCHARGE
Start: 2025-08-01

## 2025-08-01 RX ORDER — BUTYROSPERMUM PARKII(SHEA BUTTER), SIMMONDSIA CHINENSIS (JOJOBA) SEED OIL, ALOE BARBADENSIS LEAF EXTRACT .01; 1; 3.5 G/100G; G/100G; G/100G
250 LIQUID TOPICAL
Refills: 0 | Status: DISCONTINUED | OUTPATIENT
Start: 2025-08-01 | End: 2025-08-08

## 2025-08-01 RX ORDER — FOLIC ACID 1 MG/1
1 TABLET ORAL
Qty: 0 | Refills: 0 | DISCHARGE
Start: 2025-08-01

## 2025-08-01 RX ADMIN — Medication 1 GRAM(S): at 23:29

## 2025-08-01 RX ADMIN — BUTYROSPERMUM PARKII(SHEA BUTTER), SIMMONDSIA CHINENSIS (JOJOBA) SEED OIL, ALOE BARBADENSIS LEAF EXTRACT 250 MILLIGRAM(S): .01; 1; 3.5 LIQUID TOPICAL at 18:55

## 2025-08-01 RX ADMIN — Medication 15 MILLILITER(S): at 12:28

## 2025-08-01 RX ADMIN — Medication 40 MILLIGRAM(S): at 06:12

## 2025-08-01 RX ADMIN — FOLIC ACID 1 MILLIGRAM(S): 1 TABLET ORAL at 12:28

## 2025-08-01 RX ADMIN — Medication 40 MILLIGRAM(S): at 12:27

## 2025-08-01 RX ADMIN — METOPROLOL SUCCINATE 25 MILLIGRAM(S): 50 TABLET, EXTENDED RELEASE ORAL at 06:12

## 2025-08-01 RX ADMIN — AMLODIPINE BESYLATE 10 MILLIGRAM(S): 10 TABLET ORAL at 06:12

## 2025-08-01 RX ADMIN — Medication 500 MILLIGRAM(S): at 12:28

## 2025-08-01 RX ADMIN — Medication 40 MILLIGRAM(S): at 18:55

## 2025-08-01 RX ADMIN — Medication 1 GRAM(S): at 18:55

## 2025-08-01 RX ADMIN — Medication 1 GRAM(S): at 06:12

## 2025-08-01 RX ADMIN — CEFEPIME 100 MILLIGRAM(S): 2 INJECTION, POWDER, FOR SOLUTION INTRAVENOUS at 18:55

## 2025-08-01 RX ADMIN — Medication 1 GRAM(S): at 12:28

## 2025-08-02 LAB
ANION GAP SERPL CALC-SCNC: 7 MMOL/L — SIGNIFICANT CHANGE UP (ref 5–17)
BUN SERPL-MCNC: 66 MG/DL — HIGH (ref 7–23)
CALCIUM SERPL-MCNC: 8.1 MG/DL — LOW (ref 8.5–10.1)
CHLORIDE SERPL-SCNC: 114 MMOL/L — HIGH (ref 96–108)
CO2 SERPL-SCNC: 21 MMOL/L — LOW (ref 22–31)
CREAT SERPL-MCNC: 3.9 MG/DL — HIGH (ref 0.5–1.3)
EGFR: 10 ML/MIN/1.73M2 — LOW
EGFR: 10 ML/MIN/1.73M2 — LOW
GLUCOSE SERPL-MCNC: 193 MG/DL — HIGH (ref 70–99)
HCT VFR BLD CALC: 27.4 % — LOW (ref 34.5–45)
HGB BLD-MCNC: 8.7 G/DL — LOW (ref 11.5–15.5)
MCHC RBC-ENTMCNC: 29 PG — SIGNIFICANT CHANGE UP (ref 27–34)
MCHC RBC-ENTMCNC: 31.8 G/DL — LOW (ref 32–36)
MCV RBC AUTO: 91.3 FL — SIGNIFICANT CHANGE UP (ref 80–100)
NRBC # BLD AUTO: 0 K/UL — SIGNIFICANT CHANGE UP (ref 0–0)
NRBC # FLD: 0 K/UL — SIGNIFICANT CHANGE UP (ref 0–0)
NRBC BLD AUTO-RTO: 0 /100 WBCS — SIGNIFICANT CHANGE UP (ref 0–0)
PLATELET # BLD AUTO: 116 K/UL — LOW (ref 150–400)
PMV BLD: 11.3 FL — SIGNIFICANT CHANGE UP (ref 7–13)
POTASSIUM SERPL-MCNC: 3.8 MMOL/L — SIGNIFICANT CHANGE UP (ref 3.5–5.3)
POTASSIUM SERPL-SCNC: 3.8 MMOL/L — SIGNIFICANT CHANGE UP (ref 3.5–5.3)
RBC # BLD: 3 M/UL — LOW (ref 3.8–5.2)
RBC # FLD: 19.1 % — HIGH (ref 10.3–14.5)
SODIUM SERPL-SCNC: 142 MMOL/L — SIGNIFICANT CHANGE UP (ref 135–145)
WBC # BLD: 16.39 K/UL — HIGH (ref 3.8–10.5)
WBC # FLD AUTO: 16.39 K/UL — HIGH (ref 3.8–10.5)

## 2025-08-02 PROCEDURE — 99232 SBSQ HOSP IP/OBS MODERATE 35: CPT

## 2025-08-02 RX ORDER — ONDANSETRON HCL/PF 4 MG/2 ML
4 VIAL (ML) INJECTION
Refills: 0 | Status: DISCONTINUED | OUTPATIENT
Start: 2025-08-02 | End: 2025-08-08

## 2025-08-02 RX ORDER — POLYETHYLENE GLYCOL 3350 17 G/17G
17 POWDER, FOR SOLUTION ORAL DAILY
Refills: 0 | Status: DISCONTINUED | OUTPATIENT
Start: 2025-08-02 | End: 2025-08-08

## 2025-08-02 RX ORDER — SENNA 187 MG
2 TABLET ORAL AT BEDTIME
Refills: 0 | Status: DISCONTINUED | OUTPATIENT
Start: 2025-08-02 | End: 2025-08-08

## 2025-08-02 RX ADMIN — Medication 1 GRAM(S): at 06:01

## 2025-08-02 RX ADMIN — CEFEPIME 100 MILLIGRAM(S): 2 INJECTION, POWDER, FOR SOLUTION INTRAVENOUS at 19:32

## 2025-08-02 RX ADMIN — BUTYROSPERMUM PARKII(SHEA BUTTER), SIMMONDSIA CHINENSIS (JOJOBA) SEED OIL, ALOE BARBADENSIS LEAF EXTRACT 250 MILLIGRAM(S): .01; 1; 3.5 LIQUID TOPICAL at 18:32

## 2025-08-02 RX ADMIN — POLYETHYLENE GLYCOL 3350 17 GRAM(S): 17 POWDER, FOR SOLUTION ORAL at 12:30

## 2025-08-02 RX ADMIN — Medication 1 GRAM(S): at 12:31

## 2025-08-02 RX ADMIN — Medication 40 MILLIGRAM(S): at 18:32

## 2025-08-02 RX ADMIN — AMLODIPINE BESYLATE 10 MILLIGRAM(S): 10 TABLET ORAL at 06:01

## 2025-08-02 RX ADMIN — METOPROLOL SUCCINATE 25 MILLIGRAM(S): 50 TABLET, EXTENDED RELEASE ORAL at 06:01

## 2025-08-02 RX ADMIN — BUTYROSPERMUM PARKII(SHEA BUTTER), SIMMONDSIA CHINENSIS (JOJOBA) SEED OIL, ALOE BARBADENSIS LEAF EXTRACT 250 MILLIGRAM(S): .01; 1; 3.5 LIQUID TOPICAL at 06:01

## 2025-08-02 RX ADMIN — Medication 1 GRAM(S): at 18:32

## 2025-08-02 RX ADMIN — Medication 40 MILLIGRAM(S): at 06:01

## 2025-08-02 RX ADMIN — Medication 15 MILLILITER(S): at 12:31

## 2025-08-02 RX ADMIN — FOLIC ACID 1 MILLIGRAM(S): 1 TABLET ORAL at 12:30

## 2025-08-03 DIAGNOSIS — R74.8 ABNORMAL LEVELS OF OTHER SERUM ENZYMES: ICD-10-CM

## 2025-08-03 LAB
ALBUMIN SERPL ELPH-MCNC: 1.8 G/DL — LOW (ref 3.3–5)
ALP SERPL-CCNC: 936 U/L — HIGH (ref 40–120)
ALT FLD-CCNC: 114 U/L — HIGH (ref 12–78)
ANION GAP SERPL CALC-SCNC: 9 MMOL/L — SIGNIFICANT CHANGE UP (ref 5–17)
APPEARANCE UR: ABNORMAL
AST SERPL-CCNC: 65 U/L — HIGH (ref 15–37)
BACTERIA # UR AUTO: ABNORMAL /HPF
BILIRUB SERPL-MCNC: 1.3 MG/DL — HIGH (ref 0.2–1.2)
BILIRUB UR-MCNC: NEGATIVE — SIGNIFICANT CHANGE UP
BUN SERPL-MCNC: 58 MG/DL — HIGH (ref 7–23)
CALCIUM SERPL-MCNC: 8.2 MG/DL — LOW (ref 8.5–10.1)
CHLORIDE SERPL-SCNC: 116 MMOL/L — HIGH (ref 96–108)
CO2 SERPL-SCNC: 17 MMOL/L — LOW (ref 22–31)
COLOR SPEC: YELLOW — SIGNIFICANT CHANGE UP
CREAT SERPL-MCNC: 3.7 MG/DL — HIGH (ref 0.5–1.3)
DIFF PNL FLD: ABNORMAL
EGFR: 11 ML/MIN/1.73M2 — LOW
EGFR: 11 ML/MIN/1.73M2 — LOW
EPI CELLS # UR: PRESENT
GLUCOSE SERPL-MCNC: 191 MG/DL — HIGH (ref 70–99)
GLUCOSE UR QL: >=1000 MG/DL
HCT VFR BLD CALC: 27.8 % — LOW (ref 34.5–45)
HGB BLD-MCNC: 8.8 G/DL — LOW (ref 11.5–15.5)
KETONES UR QL: NEGATIVE MG/DL — SIGNIFICANT CHANGE UP
LEUKOCYTE ESTERASE UR-ACNC: ABNORMAL
MCHC RBC-ENTMCNC: 29 PG — SIGNIFICANT CHANGE UP (ref 27–34)
MCHC RBC-ENTMCNC: 31.7 G/DL — LOW (ref 32–36)
MCV RBC AUTO: 91.7 FL — SIGNIFICANT CHANGE UP (ref 80–100)
NITRITE UR-MCNC: NEGATIVE — SIGNIFICANT CHANGE UP
NRBC # BLD AUTO: 0 K/UL — SIGNIFICANT CHANGE UP (ref 0–0)
NRBC # FLD: 0 K/UL — SIGNIFICANT CHANGE UP (ref 0–0)
NRBC BLD AUTO-RTO: 0 /100 WBCS — SIGNIFICANT CHANGE UP (ref 0–0)
PH UR: 5.5 — SIGNIFICANT CHANGE UP (ref 5–8)
PLATELET # BLD AUTO: 131 K/UL — LOW (ref 150–400)
PMV BLD: 11.4 FL — SIGNIFICANT CHANGE UP (ref 7–13)
POTASSIUM SERPL-MCNC: 3.6 MMOL/L — SIGNIFICANT CHANGE UP (ref 3.5–5.3)
POTASSIUM SERPL-SCNC: 3.6 MMOL/L — SIGNIFICANT CHANGE UP (ref 3.5–5.3)
PROT SERPL-MCNC: 4.9 G/DL — LOW (ref 6–8.3)
PROT UR-MCNC: 300 MG/DL
RBC # BLD: 3.03 M/UL — LOW (ref 3.8–5.2)
RBC # FLD: 19.9 % — HIGH (ref 10.3–14.5)
RBC CASTS # UR COMP ASSIST: 3 /HPF — SIGNIFICANT CHANGE UP (ref 0–4)
SODIUM SERPL-SCNC: 142 MMOL/L — SIGNIFICANT CHANGE UP (ref 135–145)
SP GR SPEC: 1.02 — SIGNIFICANT CHANGE UP (ref 1–1.03)
UROBILINOGEN FLD QL: 0.2 MG/DL — SIGNIFICANT CHANGE UP (ref 0.2–1)
WBC # BLD: 20.71 K/UL — HIGH (ref 3.8–10.5)
WBC # FLD AUTO: 20.71 K/UL — HIGH (ref 3.8–10.5)
WBC UR QL: 28 /HPF — HIGH (ref 0–5)

## 2025-08-03 PROCEDURE — 76705 ECHO EXAM OF ABDOMEN: CPT | Mod: 26

## 2025-08-03 PROCEDURE — 99233 SBSQ HOSP IP/OBS HIGH 50: CPT

## 2025-08-03 PROCEDURE — 74176 CT ABD & PELVIS W/O CONTRAST: CPT | Mod: 26

## 2025-08-03 RX ORDER — B1/B2/B3/B5/B6/B12/VIT C/FOLIC 500-0.5 MG
1 TABLET ORAL DAILY
Refills: 0 | Status: DISCONTINUED | OUTPATIENT
Start: 2025-08-03 | End: 2025-08-08

## 2025-08-03 RX ADMIN — BUTYROSPERMUM PARKII(SHEA BUTTER), SIMMONDSIA CHINENSIS (JOJOBA) SEED OIL, ALOE BARBADENSIS LEAF EXTRACT 250 MILLIGRAM(S): .01; 1; 3.5 LIQUID TOPICAL at 18:25

## 2025-08-03 RX ADMIN — AMLODIPINE BESYLATE 10 MILLIGRAM(S): 10 TABLET ORAL at 06:21

## 2025-08-03 RX ADMIN — METOPROLOL SUCCINATE 25 MILLIGRAM(S): 50 TABLET, EXTENDED RELEASE ORAL at 06:22

## 2025-08-03 RX ADMIN — Medication 1 TABLET(S): at 13:18

## 2025-08-03 RX ADMIN — POLYETHYLENE GLYCOL 3350 17 GRAM(S): 17 POWDER, FOR SOLUTION ORAL at 13:18

## 2025-08-03 RX ADMIN — FOLIC ACID 1 MILLIGRAM(S): 1 TABLET ORAL at 13:17

## 2025-08-03 RX ADMIN — Medication 1 GRAM(S): at 18:25

## 2025-08-03 RX ADMIN — BUTYROSPERMUM PARKII(SHEA BUTTER), SIMMONDSIA CHINENSIS (JOJOBA) SEED OIL, ALOE BARBADENSIS LEAF EXTRACT 250 MILLIGRAM(S): .01; 1; 3.5 LIQUID TOPICAL at 06:21

## 2025-08-03 RX ADMIN — Medication 40 MILLIGRAM(S): at 06:21

## 2025-08-03 RX ADMIN — Medication 1 GRAM(S): at 06:21

## 2025-08-03 RX ADMIN — Medication 40 MILLIGRAM(S): at 18:24

## 2025-08-03 RX ADMIN — Medication 1 GRAM(S): at 13:17

## 2025-08-04 DIAGNOSIS — E11.65 TYPE 2 DIABETES MELLITUS WITH HYPERGLYCEMIA: ICD-10-CM

## 2025-08-04 LAB
ALBUMIN SERPL ELPH-MCNC: 1.9 G/DL — LOW (ref 3.3–5)
ALP SERPL-CCNC: 895 U/L — HIGH (ref 40–120)
ALT FLD-CCNC: 94 U/L — HIGH (ref 12–78)
ANION GAP SERPL CALC-SCNC: 6 MMOL/L — SIGNIFICANT CHANGE UP (ref 5–17)
AST SERPL-CCNC: 41 U/L — HIGH (ref 15–37)
BILIRUB SERPL-MCNC: 1.2 MG/DL — SIGNIFICANT CHANGE UP (ref 0.2–1.2)
BUN SERPL-MCNC: 60 MG/DL — HIGH (ref 7–23)
CALCIUM SERPL-MCNC: 8.9 MG/DL — SIGNIFICANT CHANGE UP (ref 8.5–10.1)
CHLORIDE SERPL-SCNC: 115 MMOL/L — HIGH (ref 96–108)
CO2 SERPL-SCNC: 22 MMOL/L — SIGNIFICANT CHANGE UP (ref 22–31)
CREAT SERPL-MCNC: 4.1 MG/DL — HIGH (ref 0.5–1.3)
EGFR: 10 ML/MIN/1.73M2 — LOW
EGFR: 10 ML/MIN/1.73M2 — LOW
GLUCOSE BLDC GLUCOMTR-MCNC: 272 MG/DL — HIGH (ref 70–99)
GLUCOSE BLDC GLUCOMTR-MCNC: 286 MG/DL — HIGH (ref 70–99)
GLUCOSE SERPL-MCNC: 201 MG/DL — HIGH (ref 70–99)
HCT VFR BLD CALC: 27.9 % — LOW (ref 34.5–45)
HGB BLD-MCNC: 8.7 G/DL — LOW (ref 11.5–15.5)
MAGNESIUM SERPL-MCNC: 1.9 MG/DL — SIGNIFICANT CHANGE UP (ref 1.6–2.6)
MCHC RBC-ENTMCNC: 29.4 PG — SIGNIFICANT CHANGE UP (ref 27–34)
MCHC RBC-ENTMCNC: 31.2 G/DL — LOW (ref 32–36)
MCV RBC AUTO: 94.3 FL — SIGNIFICANT CHANGE UP (ref 80–100)
NRBC # BLD AUTO: 0 K/UL — SIGNIFICANT CHANGE UP (ref 0–0)
NRBC # FLD: 0 K/UL — SIGNIFICANT CHANGE UP (ref 0–0)
NRBC BLD AUTO-RTO: 0 /100 WBCS — SIGNIFICANT CHANGE UP (ref 0–0)
PHOSPHATE SERPL-MCNC: 3 MG/DL — SIGNIFICANT CHANGE UP (ref 2.5–4.5)
PLATELET # BLD AUTO: 146 K/UL — LOW (ref 150–400)
PMV BLD: 10.9 FL — SIGNIFICANT CHANGE UP (ref 7–13)
POTASSIUM SERPL-MCNC: 4.3 MMOL/L — SIGNIFICANT CHANGE UP (ref 3.5–5.3)
POTASSIUM SERPL-SCNC: 4.3 MMOL/L — SIGNIFICANT CHANGE UP (ref 3.5–5.3)
PROT SERPL-MCNC: 5 G/DL — LOW (ref 6–8.3)
RBC # BLD: 2.96 M/UL — LOW (ref 3.8–5.2)
RBC # FLD: 20.2 % — HIGH (ref 10.3–14.5)
SODIUM SERPL-SCNC: 143 MMOL/L — SIGNIFICANT CHANGE UP (ref 135–145)
WBC # BLD: 19.88 K/UL — HIGH (ref 3.8–10.5)
WBC # FLD AUTO: 19.88 K/UL — HIGH (ref 3.8–10.5)

## 2025-08-04 PROCEDURE — 99233 SBSQ HOSP IP/OBS HIGH 50: CPT

## 2025-08-04 RX ORDER — GLUCAGON 3 MG/1
1 POWDER NASAL ONCE
Refills: 0 | Status: DISCONTINUED | OUTPATIENT
Start: 2025-08-04 | End: 2025-08-07

## 2025-08-04 RX ORDER — DEXTROSE 50 % IN WATER 50 %
25 SYRINGE (ML) INTRAVENOUS ONCE
Refills: 0 | Status: DISCONTINUED | OUTPATIENT
Start: 2025-08-04 | End: 2025-08-08

## 2025-08-04 RX ORDER — SODIUM CHLORIDE 9 G/1000ML
1000 INJECTION, SOLUTION INTRAVENOUS
Refills: 0 | Status: DISCONTINUED | OUTPATIENT
Start: 2025-08-04 | End: 2025-08-08

## 2025-08-04 RX ORDER — GLUCAGON 3 MG/1
1 POWDER NASAL ONCE
Refills: 0 | Status: DISCONTINUED | OUTPATIENT
Start: 2025-08-04 | End: 2025-08-08

## 2025-08-04 RX ORDER — DEXTROSE 50 % IN WATER 50 %
15 SYRINGE (ML) INTRAVENOUS ONCE
Refills: 0 | Status: DISCONTINUED | OUTPATIENT
Start: 2025-08-04 | End: 2025-08-08

## 2025-08-04 RX ORDER — INSULIN LISPRO 100 U/ML
INJECTION, SOLUTION INTRAVENOUS; SUBCUTANEOUS
Refills: 0 | Status: DISCONTINUED | OUTPATIENT
Start: 2025-08-04 | End: 2025-08-08

## 2025-08-04 RX ORDER — INSULIN LISPRO 100 U/ML
INJECTION, SOLUTION INTRAVENOUS; SUBCUTANEOUS AT BEDTIME
Refills: 0 | Status: DISCONTINUED | OUTPATIENT
Start: 2025-08-04 | End: 2025-08-08

## 2025-08-04 RX ORDER — DEXTROSE 50 % IN WATER 50 %
12.5 SYRINGE (ML) INTRAVENOUS ONCE
Refills: 0 | Status: DISCONTINUED | OUTPATIENT
Start: 2025-08-04 | End: 2025-08-08

## 2025-08-04 RX ADMIN — Medication 1 GRAM(S): at 05:54

## 2025-08-04 RX ADMIN — FOLIC ACID 1 MILLIGRAM(S): 1 TABLET ORAL at 13:26

## 2025-08-04 RX ADMIN — POLYETHYLENE GLYCOL 3350 17 GRAM(S): 17 POWDER, FOR SOLUTION ORAL at 13:25

## 2025-08-04 RX ADMIN — BUTYROSPERMUM PARKII(SHEA BUTTER), SIMMONDSIA CHINENSIS (JOJOBA) SEED OIL, ALOE BARBADENSIS LEAF EXTRACT 250 MILLIGRAM(S): .01; 1; 3.5 LIQUID TOPICAL at 17:53

## 2025-08-04 RX ADMIN — Medication 2 TABLET(S): at 22:23

## 2025-08-04 RX ADMIN — Medication 1 TABLET(S): at 13:26

## 2025-08-04 RX ADMIN — BUTYROSPERMUM PARKII(SHEA BUTTER), SIMMONDSIA CHINENSIS (JOJOBA) SEED OIL, ALOE BARBADENSIS LEAF EXTRACT 250 MILLIGRAM(S): .01; 1; 3.5 LIQUID TOPICAL at 05:53

## 2025-08-04 RX ADMIN — Medication 1 GRAM(S): at 17:53

## 2025-08-04 RX ADMIN — INSULIN LISPRO 1: 100 INJECTION, SOLUTION INTRAVENOUS; SUBCUTANEOUS at 22:48

## 2025-08-04 RX ADMIN — Medication 1 GRAM(S): at 23:40

## 2025-08-04 RX ADMIN — Medication 40 MILLIGRAM(S): at 17:52

## 2025-08-04 RX ADMIN — Medication 40 MILLIGRAM(S): at 05:54

## 2025-08-04 RX ADMIN — Medication 1 GRAM(S): at 13:26

## 2025-08-04 RX ADMIN — AMLODIPINE BESYLATE 10 MILLIGRAM(S): 10 TABLET ORAL at 05:53

## 2025-08-04 RX ADMIN — METOPROLOL SUCCINATE 25 MILLIGRAM(S): 50 TABLET, EXTENDED RELEASE ORAL at 05:53

## 2025-08-04 RX ADMIN — Medication 650 MILLIGRAM(S): at 23:23

## 2025-08-04 RX ADMIN — Medication 650 MILLIGRAM(S): at 22:23

## 2025-08-05 LAB
ALBUMIN SERPL ELPH-MCNC: 1.8 G/DL — LOW (ref 3.3–5)
ALP SERPL-CCNC: 971 U/L — HIGH (ref 40–120)
ALT FLD-CCNC: 100 U/L — HIGH (ref 12–78)
ANION GAP SERPL CALC-SCNC: 7 MMOL/L — SIGNIFICANT CHANGE UP (ref 5–17)
AST SERPL-CCNC: 50 U/L — HIGH (ref 15–37)
BILIRUB SERPL-MCNC: 1.2 MG/DL — SIGNIFICANT CHANGE UP (ref 0.2–1.2)
BUN SERPL-MCNC: 54 MG/DL — HIGH (ref 7–23)
CALCIUM SERPL-MCNC: 9 MG/DL — SIGNIFICANT CHANGE UP (ref 8.5–10.1)
CHLORIDE SERPL-SCNC: 114 MMOL/L — HIGH (ref 96–108)
CO2 SERPL-SCNC: 22 MMOL/L — SIGNIFICANT CHANGE UP (ref 22–31)
CREAT SERPL-MCNC: 4 MG/DL — HIGH (ref 0.5–1.3)
EGFR: 10 ML/MIN/1.73M2 — LOW
EGFR: 10 ML/MIN/1.73M2 — LOW
GLUCOSE BLDC GLUCOMTR-MCNC: 166 MG/DL — HIGH (ref 70–99)
GLUCOSE BLDC GLUCOMTR-MCNC: 172 MG/DL — HIGH (ref 70–99)
GLUCOSE BLDC GLUCOMTR-MCNC: 179 MG/DL — HIGH (ref 70–99)
GLUCOSE BLDC GLUCOMTR-MCNC: 262 MG/DL — HIGH (ref 70–99)
GLUCOSE SERPL-MCNC: 167 MG/DL — HIGH (ref 70–99)
HCT VFR BLD CALC: 29.2 % — LOW (ref 34.5–45)
HGB BLD-MCNC: 9 G/DL — LOW (ref 11.5–15.5)
MAGNESIUM SERPL-MCNC: 1.9 MG/DL — SIGNIFICANT CHANGE UP (ref 1.6–2.6)
MCHC RBC-ENTMCNC: 29.2 PG — SIGNIFICANT CHANGE UP (ref 27–34)
MCHC RBC-ENTMCNC: 30.8 G/DL — LOW (ref 32–36)
MCV RBC AUTO: 94.8 FL — SIGNIFICANT CHANGE UP (ref 80–100)
NRBC # BLD AUTO: 0 K/UL — SIGNIFICANT CHANGE UP (ref 0–0)
NRBC # FLD: 0 K/UL — SIGNIFICANT CHANGE UP (ref 0–0)
NRBC BLD AUTO-RTO: 0 /100 WBCS — SIGNIFICANT CHANGE UP (ref 0–0)
PHOSPHATE SERPL-MCNC: 3 MG/DL — SIGNIFICANT CHANGE UP (ref 2.5–4.5)
PLATELET # BLD AUTO: 167 K/UL — SIGNIFICANT CHANGE UP (ref 150–400)
PMV BLD: 11 FL — SIGNIFICANT CHANGE UP (ref 7–13)
POTASSIUM SERPL-MCNC: 3.7 MMOL/L — SIGNIFICANT CHANGE UP (ref 3.5–5.3)
POTASSIUM SERPL-SCNC: 3.7 MMOL/L — SIGNIFICANT CHANGE UP (ref 3.5–5.3)
PROT SERPL-MCNC: 5.2 G/DL — LOW (ref 6–8.3)
RBC # BLD: 3.08 M/UL — LOW (ref 3.8–5.2)
RBC # FLD: 20.3 % — HIGH (ref 10.3–14.5)
SODIUM SERPL-SCNC: 143 MMOL/L — SIGNIFICANT CHANGE UP (ref 135–145)
WBC # BLD: 17.47 K/UL — HIGH (ref 3.8–10.5)
WBC # FLD AUTO: 17.47 K/UL — HIGH (ref 3.8–10.5)

## 2025-08-05 PROCEDURE — 86707 HEPATITIS BE ANTIBODY: CPT

## 2025-08-05 PROCEDURE — 85610 PROTHROMBIN TIME: CPT

## 2025-08-05 PROCEDURE — 84439 ASSAY OF FREE THYROXINE: CPT

## 2025-08-05 PROCEDURE — 76705 ECHO EXAM OF ABDOMEN: CPT

## 2025-08-05 PROCEDURE — 83540 ASSAY OF IRON: CPT

## 2025-08-05 PROCEDURE — 86704 HEP B CORE ANTIBODY TOTAL: CPT

## 2025-08-05 PROCEDURE — 84443 ASSAY THYROID STIM HORMONE: CPT

## 2025-08-05 PROCEDURE — 36430 TRANSFUSION BLD/BLD COMPNT: CPT

## 2025-08-05 PROCEDURE — 93306 TTE W/DOPPLER COMPLETE: CPT

## 2025-08-05 PROCEDURE — 84155 ASSAY OF PROTEIN SERUM: CPT

## 2025-08-05 PROCEDURE — 86850 RBC ANTIBODY SCREEN: CPT

## 2025-08-05 PROCEDURE — 80061 LIPID PANEL: CPT

## 2025-08-05 PROCEDURE — 73200 CT UPPER EXTREMITY W/O DYE: CPT

## 2025-08-05 PROCEDURE — 83036 HEMOGLOBIN GLYCOSYLATED A1C: CPT

## 2025-08-05 PROCEDURE — 85730 THROMBOPLASTIN TIME PARTIAL: CPT

## 2025-08-05 PROCEDURE — 86923 COMPATIBILITY TEST ELECTRIC: CPT

## 2025-08-05 PROCEDURE — 86900 BLOOD TYPING SEROLOGIC ABO: CPT

## 2025-08-05 PROCEDURE — 83735 ASSAY OF MAGNESIUM: CPT

## 2025-08-05 PROCEDURE — 82105 ALPHA-FETOPROTEIN SERUM: CPT

## 2025-08-05 PROCEDURE — 82272 OCCULT BLD FECES 1-3 TESTS: CPT

## 2025-08-05 PROCEDURE — P9047: CPT

## 2025-08-05 PROCEDURE — 83935 ASSAY OF URINE OSMOLALITY: CPT

## 2025-08-05 PROCEDURE — 84466 ASSAY OF TRANSFERRIN: CPT

## 2025-08-05 PROCEDURE — 86706 HEP B SURFACE ANTIBODY: CPT

## 2025-08-05 PROCEDURE — 84165 PROTEIN E-PHORESIS SERUM: CPT

## 2025-08-05 PROCEDURE — 86803 HEPATITIS C AB TEST: CPT

## 2025-08-05 PROCEDURE — 97110 THERAPEUTIC EXERCISES: CPT

## 2025-08-05 PROCEDURE — 83521 IG LIGHT CHAINS FREE EACH: CPT

## 2025-08-05 PROCEDURE — 86334 IMMUNOFIX E-PHORESIS SERUM: CPT

## 2025-08-05 PROCEDURE — 86901 BLOOD TYPING SEROLOGIC RH(D): CPT

## 2025-08-05 PROCEDURE — 83550 IRON BINDING TEST: CPT

## 2025-08-05 PROCEDURE — 86140 C-REACTIVE PROTEIN: CPT

## 2025-08-05 PROCEDURE — 82746 ASSAY OF FOLIC ACID SERUM: CPT

## 2025-08-05 PROCEDURE — 76376 3D RENDER W/INTRP POSTPROCES: CPT

## 2025-08-05 PROCEDURE — 36415 COLL VENOUS BLD VENIPUNCTURE: CPT

## 2025-08-05 PROCEDURE — 82728 ASSAY OF FERRITIN: CPT

## 2025-08-05 PROCEDURE — 82962 GLUCOSE BLOOD TEST: CPT

## 2025-08-05 PROCEDURE — 83690 ASSAY OF LIPASE: CPT

## 2025-08-05 PROCEDURE — 87040 BLOOD CULTURE FOR BACTERIA: CPT

## 2025-08-05 PROCEDURE — 87350 HEPATITIS BE AG IA: CPT

## 2025-08-05 PROCEDURE — 82607 VITAMIN B-12: CPT

## 2025-08-05 PROCEDURE — 99232 SBSQ HOSP IP/OBS MODERATE 35: CPT

## 2025-08-05 PROCEDURE — 81001 URINALYSIS AUTO W/SCOPE: CPT

## 2025-08-05 PROCEDURE — 83605 ASSAY OF LACTIC ACID: CPT

## 2025-08-05 PROCEDURE — 85652 RBC SED RATE AUTOMATED: CPT

## 2025-08-05 PROCEDURE — 93970 EXTREMITY STUDY: CPT

## 2025-08-05 PROCEDURE — 87507 IADNA-DNA/RNA PROBE TQ 12-25: CPT

## 2025-08-05 PROCEDURE — 97535 SELF CARE MNGMENT TRAINING: CPT

## 2025-08-05 PROCEDURE — 87517 HEPATITIS B DNA QUANT: CPT

## 2025-08-05 PROCEDURE — 85045 AUTOMATED RETICULOCYTE COUNT: CPT

## 2025-08-05 PROCEDURE — 93005 ELECTROCARDIOGRAM TRACING: CPT

## 2025-08-05 PROCEDURE — P9016: CPT

## 2025-08-05 PROCEDURE — 97116 GAIT TRAINING THERAPY: CPT

## 2025-08-05 PROCEDURE — 84300 ASSAY OF URINE SODIUM: CPT

## 2025-08-05 PROCEDURE — 84100 ASSAY OF PHOSPHORUS: CPT

## 2025-08-05 PROCEDURE — 85027 COMPLETE CBC AUTOMATED: CPT

## 2025-08-05 PROCEDURE — 97530 THERAPEUTIC ACTIVITIES: CPT

## 2025-08-05 PROCEDURE — 82784 ASSAY IGA/IGD/IGG/IGM EACH: CPT

## 2025-08-05 PROCEDURE — 97162 PT EVAL MOD COMPLEX 30 MIN: CPT

## 2025-08-05 PROCEDURE — 80053 COMPREHEN METABOLIC PANEL: CPT

## 2025-08-05 PROCEDURE — 87340 HEPATITIS B SURFACE AG IA: CPT

## 2025-08-05 PROCEDURE — 74176 CT ABD & PELVIS W/O CONTRAST: CPT

## 2025-08-05 PROCEDURE — 97166 OT EVAL MOD COMPLEX 45 MIN: CPT

## 2025-08-05 PROCEDURE — 85025 COMPLETE CBC W/AUTO DIFF WBC: CPT

## 2025-08-05 PROCEDURE — 83880 ASSAY OF NATRIURETIC PEPTIDE: CPT

## 2025-08-05 PROCEDURE — 80048 BASIC METABOLIC PNL TOTAL CA: CPT

## 2025-08-05 PROCEDURE — 71045 X-RAY EXAM CHEST 1 VIEW: CPT

## 2025-08-05 PROCEDURE — 84550 ASSAY OF BLOOD/URIC ACID: CPT

## 2025-08-05 RX ORDER — FUROSEMIDE 10 MG/ML
40 INJECTION INTRAMUSCULAR; INTRAVENOUS ONCE
Refills: 0 | Status: COMPLETED | OUTPATIENT
Start: 2025-08-05 | End: 2025-08-05

## 2025-08-05 RX ADMIN — Medication 40 MILLIGRAM(S): at 17:37

## 2025-08-05 RX ADMIN — Medication 1 GRAM(S): at 06:23

## 2025-08-05 RX ADMIN — BUTYROSPERMUM PARKII(SHEA BUTTER), SIMMONDSIA CHINENSIS (JOJOBA) SEED OIL, ALOE BARBADENSIS LEAF EXTRACT 250 MILLIGRAM(S): .01; 1; 3.5 LIQUID TOPICAL at 17:37

## 2025-08-05 RX ADMIN — AMLODIPINE BESYLATE 10 MILLIGRAM(S): 10 TABLET ORAL at 06:23

## 2025-08-05 RX ADMIN — Medication 1 TABLET(S): at 15:54

## 2025-08-05 RX ADMIN — Medication 1 GRAM(S): at 15:54

## 2025-08-05 RX ADMIN — METOPROLOL SUCCINATE 25 MILLIGRAM(S): 50 TABLET, EXTENDED RELEASE ORAL at 06:23

## 2025-08-05 RX ADMIN — Medication 40 MILLIGRAM(S): at 06:24

## 2025-08-05 RX ADMIN — Medication 650 MILLIGRAM(S): at 18:20

## 2025-08-05 RX ADMIN — Medication 650 MILLIGRAM(S): at 17:37

## 2025-08-05 RX ADMIN — INSULIN LISPRO 1: 100 INJECTION, SOLUTION INTRAVENOUS; SUBCUTANEOUS at 08:31

## 2025-08-05 RX ADMIN — BUTYROSPERMUM PARKII(SHEA BUTTER), SIMMONDSIA CHINENSIS (JOJOBA) SEED OIL, ALOE BARBADENSIS LEAF EXTRACT 250 MILLIGRAM(S): .01; 1; 3.5 LIQUID TOPICAL at 06:23

## 2025-08-05 RX ADMIN — FOLIC ACID 1 MILLIGRAM(S): 1 TABLET ORAL at 15:54

## 2025-08-05 RX ADMIN — INSULIN LISPRO 3: 100 INJECTION, SOLUTION INTRAVENOUS; SUBCUTANEOUS at 17:37

## 2025-08-05 RX ADMIN — FUROSEMIDE 40 MILLIGRAM(S): 10 INJECTION INTRAMUSCULAR; INTRAVENOUS at 17:37

## 2025-08-05 RX ADMIN — Medication 1 GRAM(S): at 22:29

## 2025-08-06 LAB
ALBUMIN SERPL ELPH-MCNC: 1.8 G/DL — LOW (ref 3.3–5)
ALP SERPL-CCNC: 965 U/L — HIGH (ref 40–120)
ALT FLD-CCNC: 93 U/L — HIGH (ref 12–78)
ANION GAP SERPL CALC-SCNC: 6 MMOL/L — SIGNIFICANT CHANGE UP (ref 5–17)
AST SERPL-CCNC: 49 U/L — HIGH (ref 15–37)
BILIRUB SERPL-MCNC: 1.2 MG/DL — SIGNIFICANT CHANGE UP (ref 0.2–1.2)
BUN SERPL-MCNC: 52 MG/DL — HIGH (ref 7–23)
CALCIUM SERPL-MCNC: 8.8 MG/DL — SIGNIFICANT CHANGE UP (ref 8.5–10.1)
CHLORIDE SERPL-SCNC: 115 MMOL/L — HIGH (ref 96–108)
CO2 SERPL-SCNC: 22 MMOL/L — SIGNIFICANT CHANGE UP (ref 22–31)
CREAT SERPL-MCNC: 4.1 MG/DL — HIGH (ref 0.5–1.3)
EGFR: 10 ML/MIN/1.73M2 — LOW
EGFR: 10 ML/MIN/1.73M2 — LOW
GLUCOSE BLDC GLUCOMTR-MCNC: 164 MG/DL — HIGH (ref 70–99)
GLUCOSE BLDC GLUCOMTR-MCNC: 171 MG/DL — HIGH (ref 70–99)
GLUCOSE BLDC GLUCOMTR-MCNC: 174 MG/DL — HIGH (ref 70–99)
GLUCOSE BLDC GLUCOMTR-MCNC: 191 MG/DL — HIGH (ref 70–99)
GLUCOSE SERPL-MCNC: 195 MG/DL — HIGH (ref 70–99)
HCT VFR BLD CALC: 28 % — LOW (ref 34.5–45)
HGB BLD-MCNC: 8.7 G/DL — LOW (ref 11.5–15.5)
MAGNESIUM SERPL-MCNC: 1.9 MG/DL — SIGNIFICANT CHANGE UP (ref 1.6–2.6)
MCHC RBC-ENTMCNC: 29.5 PG — SIGNIFICANT CHANGE UP (ref 27–34)
MCHC RBC-ENTMCNC: 31.1 G/DL — LOW (ref 32–36)
MCV RBC AUTO: 94.9 FL — SIGNIFICANT CHANGE UP (ref 80–100)
NRBC # BLD AUTO: 0 K/UL — SIGNIFICANT CHANGE UP (ref 0–0)
NRBC # FLD: 0 K/UL — SIGNIFICANT CHANGE UP (ref 0–0)
NRBC BLD AUTO-RTO: 0 /100 WBCS — SIGNIFICANT CHANGE UP (ref 0–0)
PHOSPHATE SERPL-MCNC: 3.2 MG/DL — SIGNIFICANT CHANGE UP (ref 2.5–4.5)
PLATELET # BLD AUTO: 161 K/UL — SIGNIFICANT CHANGE UP (ref 150–400)
PMV BLD: 11 FL — SIGNIFICANT CHANGE UP (ref 7–13)
POTASSIUM SERPL-MCNC: 3.7 MMOL/L — SIGNIFICANT CHANGE UP (ref 3.5–5.3)
POTASSIUM SERPL-SCNC: 3.7 MMOL/L — SIGNIFICANT CHANGE UP (ref 3.5–5.3)
PROT SERPL-MCNC: 5 G/DL — LOW (ref 6–8.3)
RBC # BLD: 2.95 M/UL — LOW (ref 3.8–5.2)
RBC # FLD: 20.3 % — HIGH (ref 10.3–14.5)
SODIUM SERPL-SCNC: 143 MMOL/L — SIGNIFICANT CHANGE UP (ref 135–145)
WBC # BLD: 13.32 K/UL — HIGH (ref 3.8–10.5)
WBC # FLD AUTO: 13.32 K/UL — HIGH (ref 3.8–10.5)

## 2025-08-06 PROCEDURE — 86901 BLOOD TYPING SEROLOGIC RH(D): CPT

## 2025-08-06 PROCEDURE — 84155 ASSAY OF PROTEIN SERUM: CPT

## 2025-08-06 PROCEDURE — 82105 ALPHA-FETOPROTEIN SERUM: CPT

## 2025-08-06 PROCEDURE — 84165 PROTEIN E-PHORESIS SERUM: CPT

## 2025-08-06 PROCEDURE — 85025 COMPLETE CBC W/AUTO DIFF WBC: CPT

## 2025-08-06 PROCEDURE — 76705 ECHO EXAM OF ABDOMEN: CPT

## 2025-08-06 PROCEDURE — 97166 OT EVAL MOD COMPLEX 45 MIN: CPT

## 2025-08-06 PROCEDURE — 86706 HEP B SURFACE ANTIBODY: CPT

## 2025-08-06 PROCEDURE — 82272 OCCULT BLD FECES 1-3 TESTS: CPT

## 2025-08-06 PROCEDURE — 85027 COMPLETE CBC AUTOMATED: CPT

## 2025-08-06 PROCEDURE — 84300 ASSAY OF URINE SODIUM: CPT

## 2025-08-06 PROCEDURE — 83935 ASSAY OF URINE OSMOLALITY: CPT

## 2025-08-06 PROCEDURE — 97110 THERAPEUTIC EXERCISES: CPT

## 2025-08-06 PROCEDURE — 81001 URINALYSIS AUTO W/SCOPE: CPT

## 2025-08-06 PROCEDURE — 83550 IRON BINDING TEST: CPT

## 2025-08-06 PROCEDURE — 99232 SBSQ HOSP IP/OBS MODERATE 35: CPT

## 2025-08-06 PROCEDURE — 83690 ASSAY OF LIPASE: CPT

## 2025-08-06 PROCEDURE — 87517 HEPATITIS B DNA QUANT: CPT

## 2025-08-06 PROCEDURE — 86334 IMMUNOFIX E-PHORESIS SERUM: CPT

## 2025-08-06 PROCEDURE — 97530 THERAPEUTIC ACTIVITIES: CPT

## 2025-08-06 PROCEDURE — 82784 ASSAY IGA/IGD/IGG/IGM EACH: CPT

## 2025-08-06 PROCEDURE — 73200 CT UPPER EXTREMITY W/O DYE: CPT

## 2025-08-06 PROCEDURE — 84466 ASSAY OF TRANSFERRIN: CPT

## 2025-08-06 PROCEDURE — 84550 ASSAY OF BLOOD/URIC ACID: CPT

## 2025-08-06 PROCEDURE — 85045 AUTOMATED RETICULOCYTE COUNT: CPT

## 2025-08-06 PROCEDURE — P9016: CPT

## 2025-08-06 PROCEDURE — 86923 COMPATIBILITY TEST ELECTRIC: CPT

## 2025-08-06 PROCEDURE — 97162 PT EVAL MOD COMPLEX 30 MIN: CPT

## 2025-08-06 PROCEDURE — 93306 TTE W/DOPPLER COMPLETE: CPT

## 2025-08-06 PROCEDURE — 86850 RBC ANTIBODY SCREEN: CPT

## 2025-08-06 PROCEDURE — 97116 GAIT TRAINING THERAPY: CPT

## 2025-08-06 PROCEDURE — 83521 IG LIGHT CHAINS FREE EACH: CPT

## 2025-08-06 PROCEDURE — 93970 EXTREMITY STUDY: CPT

## 2025-08-06 PROCEDURE — 83880 ASSAY OF NATRIURETIC PEPTIDE: CPT

## 2025-08-06 PROCEDURE — 84443 ASSAY THYROID STIM HORMONE: CPT

## 2025-08-06 PROCEDURE — 86900 BLOOD TYPING SEROLOGIC ABO: CPT

## 2025-08-06 PROCEDURE — 82728 ASSAY OF FERRITIN: CPT

## 2025-08-06 PROCEDURE — 80061 LIPID PANEL: CPT

## 2025-08-06 PROCEDURE — 83605 ASSAY OF LACTIC ACID: CPT

## 2025-08-06 PROCEDURE — 86704 HEP B CORE ANTIBODY TOTAL: CPT

## 2025-08-06 PROCEDURE — 74176 CT ABD & PELVIS W/O CONTRAST: CPT

## 2025-08-06 PROCEDURE — 85730 THROMBOPLASTIN TIME PARTIAL: CPT

## 2025-08-06 PROCEDURE — 87350 HEPATITIS BE AG IA: CPT

## 2025-08-06 PROCEDURE — 36415 COLL VENOUS BLD VENIPUNCTURE: CPT

## 2025-08-06 PROCEDURE — 82746 ASSAY OF FOLIC ACID SERUM: CPT

## 2025-08-06 PROCEDURE — 84100 ASSAY OF PHOSPHORUS: CPT

## 2025-08-06 PROCEDURE — 86803 HEPATITIS C AB TEST: CPT

## 2025-08-06 PROCEDURE — 86707 HEPATITIS BE ANTIBODY: CPT

## 2025-08-06 PROCEDURE — 85610 PROTHROMBIN TIME: CPT

## 2025-08-06 PROCEDURE — 80053 COMPREHEN METABOLIC PANEL: CPT

## 2025-08-06 PROCEDURE — 36430 TRANSFUSION BLD/BLD COMPNT: CPT

## 2025-08-06 PROCEDURE — 99233 SBSQ HOSP IP/OBS HIGH 50: CPT

## 2025-08-06 PROCEDURE — 83735 ASSAY OF MAGNESIUM: CPT

## 2025-08-06 PROCEDURE — 80048 BASIC METABOLIC PNL TOTAL CA: CPT

## 2025-08-06 PROCEDURE — 87340 HEPATITIS B SURFACE AG IA: CPT

## 2025-08-06 PROCEDURE — 86140 C-REACTIVE PROTEIN: CPT

## 2025-08-06 PROCEDURE — 82962 GLUCOSE BLOOD TEST: CPT

## 2025-08-06 PROCEDURE — P9047: CPT

## 2025-08-06 PROCEDURE — 87507 IADNA-DNA/RNA PROBE TQ 12-25: CPT

## 2025-08-06 PROCEDURE — 97535 SELF CARE MNGMENT TRAINING: CPT

## 2025-08-06 PROCEDURE — 82607 VITAMIN B-12: CPT

## 2025-08-06 PROCEDURE — 71045 X-RAY EXAM CHEST 1 VIEW: CPT

## 2025-08-06 PROCEDURE — 83540 ASSAY OF IRON: CPT

## 2025-08-06 PROCEDURE — 93005 ELECTROCARDIOGRAM TRACING: CPT

## 2025-08-06 PROCEDURE — 76376 3D RENDER W/INTRP POSTPROCES: CPT

## 2025-08-06 PROCEDURE — 87040 BLOOD CULTURE FOR BACTERIA: CPT

## 2025-08-06 PROCEDURE — 83036 HEMOGLOBIN GLYCOSYLATED A1C: CPT

## 2025-08-06 PROCEDURE — 85652 RBC SED RATE AUTOMATED: CPT

## 2025-08-06 PROCEDURE — 84439 ASSAY OF FREE THYROXINE: CPT

## 2025-08-06 RX ADMIN — METOPROLOL SUCCINATE 25 MILLIGRAM(S): 50 TABLET, EXTENDED RELEASE ORAL at 05:44

## 2025-08-06 RX ADMIN — Medication 1 GRAM(S): at 05:44

## 2025-08-06 RX ADMIN — INSULIN LISPRO 1: 100 INJECTION, SOLUTION INTRAVENOUS; SUBCUTANEOUS at 17:27

## 2025-08-06 RX ADMIN — Medication 1 TABLET(S): at 12:59

## 2025-08-06 RX ADMIN — Medication 1 GRAM(S): at 17:26

## 2025-08-06 RX ADMIN — Medication 40 MILLIGRAM(S): at 17:27

## 2025-08-06 RX ADMIN — BUTYROSPERMUM PARKII(SHEA BUTTER), SIMMONDSIA CHINENSIS (JOJOBA) SEED OIL, ALOE BARBADENSIS LEAF EXTRACT 250 MILLIGRAM(S): .01; 1; 3.5 LIQUID TOPICAL at 17:26

## 2025-08-06 RX ADMIN — INSULIN LISPRO 1: 100 INJECTION, SOLUTION INTRAVENOUS; SUBCUTANEOUS at 12:37

## 2025-08-06 RX ADMIN — Medication 1 GRAM(S): at 23:02

## 2025-08-06 RX ADMIN — AMLODIPINE BESYLATE 10 MILLIGRAM(S): 10 TABLET ORAL at 05:44

## 2025-08-06 RX ADMIN — FOLIC ACID 1 MILLIGRAM(S): 1 TABLET ORAL at 12:59

## 2025-08-06 RX ADMIN — INSULIN LISPRO 1: 100 INJECTION, SOLUTION INTRAVENOUS; SUBCUTANEOUS at 08:06

## 2025-08-06 RX ADMIN — BUTYROSPERMUM PARKII(SHEA BUTTER), SIMMONDSIA CHINENSIS (JOJOBA) SEED OIL, ALOE BARBADENSIS LEAF EXTRACT 250 MILLIGRAM(S): .01; 1; 3.5 LIQUID TOPICAL at 05:44

## 2025-08-06 RX ADMIN — Medication 1 GRAM(S): at 13:00

## 2025-08-06 RX ADMIN — Medication 40 MILLIGRAM(S): at 05:44

## 2025-08-07 LAB
ALBUMIN SERPL ELPH-MCNC: 1.8 G/DL — LOW (ref 3.3–5)
ALP SERPL-CCNC: 899 U/L — HIGH (ref 40–120)
ALT FLD-CCNC: 79 U/L — HIGH (ref 12–78)
ANION GAP SERPL CALC-SCNC: 8 MMOL/L — SIGNIFICANT CHANGE UP (ref 5–17)
AST SERPL-CCNC: 41 U/L — HIGH (ref 15–37)
BILIRUB SERPL-MCNC: 1.2 MG/DL — SIGNIFICANT CHANGE UP (ref 0.2–1.2)
BUN SERPL-MCNC: 51 MG/DL — HIGH (ref 7–23)
CALCIUM SERPL-MCNC: 8.6 MG/DL — SIGNIFICANT CHANGE UP (ref 8.5–10.1)
CHLORIDE SERPL-SCNC: 115 MMOL/L — HIGH (ref 96–108)
CO2 SERPL-SCNC: 20 MMOL/L — LOW (ref 22–31)
CREAT SERPL-MCNC: 4 MG/DL — HIGH (ref 0.5–1.3)
EGFR: 10 ML/MIN/1.73M2 — LOW
EGFR: 10 ML/MIN/1.73M2 — LOW
GLUCOSE BLDC GLUCOMTR-MCNC: 145 MG/DL — HIGH (ref 70–99)
GLUCOSE BLDC GLUCOMTR-MCNC: 156 MG/DL — HIGH (ref 70–99)
GLUCOSE BLDC GLUCOMTR-MCNC: 183 MG/DL — HIGH (ref 70–99)
GLUCOSE BLDC GLUCOMTR-MCNC: 188 MG/DL — HIGH (ref 70–99)
GLUCOSE SERPL-MCNC: 172 MG/DL — HIGH (ref 70–99)
HCT VFR BLD CALC: 28.2 % — LOW (ref 34.5–45)
HGB BLD-MCNC: 9.1 G/DL — LOW (ref 11.5–15.5)
MCHC RBC-ENTMCNC: 30 PG — SIGNIFICANT CHANGE UP (ref 27–34)
MCHC RBC-ENTMCNC: 32.3 G/DL — SIGNIFICANT CHANGE UP (ref 32–36)
MCV RBC AUTO: 93.1 FL — SIGNIFICANT CHANGE UP (ref 80–100)
NRBC # BLD AUTO: 0 K/UL — SIGNIFICANT CHANGE UP (ref 0–0)
NRBC # FLD: 0 K/UL — SIGNIFICANT CHANGE UP (ref 0–0)
NRBC BLD AUTO-RTO: 0 /100 WBCS — SIGNIFICANT CHANGE UP (ref 0–0)
PLATELET # BLD AUTO: 170 K/UL — SIGNIFICANT CHANGE UP (ref 150–400)
PMV BLD: 11.2 FL — SIGNIFICANT CHANGE UP (ref 7–13)
POTASSIUM SERPL-MCNC: 3.5 MMOL/L — SIGNIFICANT CHANGE UP (ref 3.5–5.3)
POTASSIUM SERPL-SCNC: 3.5 MMOL/L — SIGNIFICANT CHANGE UP (ref 3.5–5.3)
PROT SERPL-MCNC: 5.1 G/DL — LOW (ref 6–8.3)
RBC # BLD: 3.03 M/UL — LOW (ref 3.8–5.2)
RBC # FLD: 19.9 % — HIGH (ref 10.3–14.5)
SODIUM SERPL-SCNC: 143 MMOL/L — SIGNIFICANT CHANGE UP (ref 135–145)
WBC # BLD: 12.05 K/UL — HIGH (ref 3.8–10.5)
WBC # FLD AUTO: 12.05 K/UL — HIGH (ref 3.8–10.5)

## 2025-08-07 PROCEDURE — 76705 ECHO EXAM OF ABDOMEN: CPT | Mod: 26

## 2025-08-07 PROCEDURE — 99233 SBSQ HOSP IP/OBS HIGH 50: CPT

## 2025-08-07 RX ORDER — ALBUMIN (HUMAN) 12.5 G/50ML
50 INJECTION, SOLUTION INTRAVENOUS ONCE
Refills: 0 | Status: DISCONTINUED | OUTPATIENT
Start: 2025-08-07 | End: 2025-08-08

## 2025-08-07 RX ADMIN — Medication 1 GRAM(S): at 12:33

## 2025-08-07 RX ADMIN — FOLIC ACID 1 MILLIGRAM(S): 1 TABLET ORAL at 12:33

## 2025-08-07 RX ADMIN — AMLODIPINE BESYLATE 10 MILLIGRAM(S): 10 TABLET ORAL at 06:11

## 2025-08-07 RX ADMIN — Medication 20 MILLIEQUIVALENT(S): at 12:33

## 2025-08-07 RX ADMIN — INSULIN LISPRO 1: 100 INJECTION, SOLUTION INTRAVENOUS; SUBCUTANEOUS at 12:24

## 2025-08-07 RX ADMIN — Medication 650 MILLIGRAM(S): at 06:12

## 2025-08-07 RX ADMIN — Medication 650 MILLIGRAM(S): at 23:32

## 2025-08-07 RX ADMIN — BUTYROSPERMUM PARKII(SHEA BUTTER), SIMMONDSIA CHINENSIS (JOJOBA) SEED OIL, ALOE BARBADENSIS LEAF EXTRACT 250 MILLIGRAM(S): .01; 1; 3.5 LIQUID TOPICAL at 17:14

## 2025-08-07 RX ADMIN — INSULIN LISPRO 1: 100 INJECTION, SOLUTION INTRAVENOUS; SUBCUTANEOUS at 08:08

## 2025-08-07 RX ADMIN — Medication 1 GRAM(S): at 21:13

## 2025-08-07 RX ADMIN — Medication 1 GRAM(S): at 06:12

## 2025-08-07 RX ADMIN — Medication 2 TABLET(S): at 21:11

## 2025-08-07 RX ADMIN — Medication 1 TABLET(S): at 12:32

## 2025-08-07 RX ADMIN — BUTYROSPERMUM PARKII(SHEA BUTTER), SIMMONDSIA CHINENSIS (JOJOBA) SEED OIL, ALOE BARBADENSIS LEAF EXTRACT 250 MILLIGRAM(S): .01; 1; 3.5 LIQUID TOPICAL at 06:12

## 2025-08-07 RX ADMIN — Medication 40 MILLIGRAM(S): at 06:13

## 2025-08-07 RX ADMIN — Medication 1 GRAM(S): at 17:14

## 2025-08-07 RX ADMIN — METOPROLOL SUCCINATE 25 MILLIGRAM(S): 50 TABLET, EXTENDED RELEASE ORAL at 06:13

## 2025-08-07 RX ADMIN — Medication 40 MILLIGRAM(S): at 17:14

## 2025-08-07 RX ADMIN — Medication 650 MILLIGRAM(S): at 22:32

## 2025-08-08 VITALS
SYSTOLIC BLOOD PRESSURE: 116 MMHG | HEART RATE: 73 BPM | DIASTOLIC BLOOD PRESSURE: 67 MMHG | TEMPERATURE: 98 F | OXYGEN SATURATION: 96 % | RESPIRATION RATE: 18 BRPM

## 2025-08-08 LAB
ALBUMIN SERPL ELPH-MCNC: 1.8 G/DL — LOW (ref 3.3–5)
ALP SERPL-CCNC: 865 U/L — HIGH (ref 40–120)
ALT FLD-CCNC: 71 U/L — SIGNIFICANT CHANGE UP (ref 12–78)
ANION GAP SERPL CALC-SCNC: 8 MMOL/L — SIGNIFICANT CHANGE UP (ref 5–17)
AST SERPL-CCNC: 37 U/L — SIGNIFICANT CHANGE UP (ref 15–37)
BASOPHILS # BLD AUTO: 0.05 K/UL — SIGNIFICANT CHANGE UP (ref 0–0.2)
BASOPHILS NFR BLD AUTO: 0.5 % — SIGNIFICANT CHANGE UP (ref 0–2)
BILIRUB SERPL-MCNC: 1.1 MG/DL — SIGNIFICANT CHANGE UP (ref 0.2–1.2)
BUN SERPL-MCNC: 48 MG/DL — HIGH (ref 7–23)
CALCIUM SERPL-MCNC: 8.7 MG/DL — SIGNIFICANT CHANGE UP (ref 8.5–10.1)
CHLORIDE SERPL-SCNC: 117 MMOL/L — HIGH (ref 96–108)
CO2 SERPL-SCNC: 19 MMOL/L — LOW (ref 22–31)
CREAT SERPL-MCNC: 3.9 MG/DL — HIGH (ref 0.5–1.3)
EGFR: 10 ML/MIN/1.73M2 — LOW
EGFR: 10 ML/MIN/1.73M2 — LOW
EOSINOPHIL # BLD AUTO: 1.36 K/UL — HIGH (ref 0–0.5)
EOSINOPHIL NFR BLD AUTO: 12.8 % — HIGH (ref 0–6)
GLUCOSE BLDC GLUCOMTR-MCNC: 144 MG/DL — HIGH (ref 70–99)
GLUCOSE BLDC GLUCOMTR-MCNC: 190 MG/DL — HIGH (ref 70–99)
GLUCOSE BLDC GLUCOMTR-MCNC: 222 MG/DL — HIGH (ref 70–99)
GLUCOSE SERPL-MCNC: 141 MG/DL — HIGH (ref 70–99)
HCT VFR BLD CALC: 29.2 % — LOW (ref 34.5–45)
HGB BLD-MCNC: 9.1 G/DL — LOW (ref 11.5–15.5)
IMM GRANULOCYTES # BLD AUTO: 0.09 K/UL — HIGH (ref 0–0.07)
IMM GRANULOCYTES NFR BLD AUTO: 0.8 % — SIGNIFICANT CHANGE UP (ref 0–0.9)
LYMPHOCYTES # BLD AUTO: 1.75 K/UL — SIGNIFICANT CHANGE UP (ref 1–3.3)
LYMPHOCYTES NFR BLD AUTO: 16.4 % — SIGNIFICANT CHANGE UP (ref 13–44)
MCHC RBC-ENTMCNC: 29.4 PG — SIGNIFICANT CHANGE UP (ref 27–34)
MCHC RBC-ENTMCNC: 31.2 G/DL — LOW (ref 32–36)
MCV RBC AUTO: 94.2 FL — SIGNIFICANT CHANGE UP (ref 80–100)
MONOCYTES # BLD AUTO: 0.63 K/UL — SIGNIFICANT CHANGE UP (ref 0–0.9)
MONOCYTES NFR BLD AUTO: 5.9 % — SIGNIFICANT CHANGE UP (ref 2–14)
NEUTROPHILS # BLD AUTO: 6.77 K/UL — SIGNIFICANT CHANGE UP (ref 1.8–7.4)
NEUTROPHILS NFR BLD AUTO: 63.6 % — SIGNIFICANT CHANGE UP (ref 43–77)
NRBC # BLD AUTO: 0 K/UL — SIGNIFICANT CHANGE UP (ref 0–0)
NRBC # FLD: 0 K/UL — SIGNIFICANT CHANGE UP (ref 0–0)
NRBC BLD AUTO-RTO: 0 /100 WBCS — SIGNIFICANT CHANGE UP (ref 0–0)
PLATELET # BLD AUTO: 168 K/UL — SIGNIFICANT CHANGE UP (ref 150–400)
PMV BLD: 10.8 FL — SIGNIFICANT CHANGE UP (ref 7–13)
POTASSIUM SERPL-MCNC: 3.5 MMOL/L — SIGNIFICANT CHANGE UP (ref 3.5–5.3)
POTASSIUM SERPL-SCNC: 3.5 MMOL/L — SIGNIFICANT CHANGE UP (ref 3.5–5.3)
PROT SERPL-MCNC: 5.2 G/DL — LOW (ref 6–8.3)
RBC # BLD: 3.1 M/UL — LOW (ref 3.8–5.2)
RBC # FLD: 20.1 % — HIGH (ref 10.3–14.5)
SODIUM SERPL-SCNC: 144 MMOL/L — SIGNIFICANT CHANGE UP (ref 135–145)
WBC # BLD: 10.65 K/UL — HIGH (ref 3.8–10.5)
WBC # FLD AUTO: 10.65 K/UL — HIGH (ref 3.8–10.5)

## 2025-08-08 PROCEDURE — 84443 ASSAY THYROID STIM HORMONE: CPT

## 2025-08-08 PROCEDURE — 93005 ELECTROCARDIOGRAM TRACING: CPT

## 2025-08-08 PROCEDURE — 83540 ASSAY OF IRON: CPT

## 2025-08-08 PROCEDURE — 82784 ASSAY IGA/IGD/IGG/IGM EACH: CPT

## 2025-08-08 PROCEDURE — 74176 CT ABD & PELVIS W/O CONTRAST: CPT

## 2025-08-08 PROCEDURE — 84165 PROTEIN E-PHORESIS SERUM: CPT

## 2025-08-08 PROCEDURE — 83735 ASSAY OF MAGNESIUM: CPT

## 2025-08-08 PROCEDURE — 83935 ASSAY OF URINE OSMOLALITY: CPT

## 2025-08-08 PROCEDURE — 85045 AUTOMATED RETICULOCYTE COUNT: CPT

## 2025-08-08 PROCEDURE — 83605 ASSAY OF LACTIC ACID: CPT

## 2025-08-08 PROCEDURE — 83036 HEMOGLOBIN GLYCOSYLATED A1C: CPT

## 2025-08-08 PROCEDURE — P9016: CPT

## 2025-08-08 PROCEDURE — 86334 IMMUNOFIX E-PHORESIS SERUM: CPT

## 2025-08-08 PROCEDURE — 84466 ASSAY OF TRANSFERRIN: CPT

## 2025-08-08 PROCEDURE — 87350 HEPATITIS BE AG IA: CPT

## 2025-08-08 PROCEDURE — 97162 PT EVAL MOD COMPLEX 30 MIN: CPT

## 2025-08-08 PROCEDURE — 86706 HEP B SURFACE ANTIBODY: CPT

## 2025-08-08 PROCEDURE — 85027 COMPLETE CBC AUTOMATED: CPT

## 2025-08-08 PROCEDURE — 83690 ASSAY OF LIPASE: CPT

## 2025-08-08 PROCEDURE — 84439 ASSAY OF FREE THYROXINE: CPT

## 2025-08-08 PROCEDURE — 85730 THROMBOPLASTIN TIME PARTIAL: CPT

## 2025-08-08 PROCEDURE — 82105 ALPHA-FETOPROTEIN SERUM: CPT

## 2025-08-08 PROCEDURE — 82746 ASSAY OF FOLIC ACID SERUM: CPT

## 2025-08-08 PROCEDURE — 36415 COLL VENOUS BLD VENIPUNCTURE: CPT

## 2025-08-08 PROCEDURE — 82607 VITAMIN B-12: CPT

## 2025-08-08 PROCEDURE — 86140 C-REACTIVE PROTEIN: CPT

## 2025-08-08 PROCEDURE — 76705 ECHO EXAM OF ABDOMEN: CPT

## 2025-08-08 PROCEDURE — 85652 RBC SED RATE AUTOMATED: CPT

## 2025-08-08 PROCEDURE — 84300 ASSAY OF URINE SODIUM: CPT

## 2025-08-08 PROCEDURE — 85025 COMPLETE CBC W/AUTO DIFF WBC: CPT

## 2025-08-08 PROCEDURE — 71045 X-RAY EXAM CHEST 1 VIEW: CPT

## 2025-08-08 PROCEDURE — 87340 HEPATITIS B SURFACE AG IA: CPT

## 2025-08-08 PROCEDURE — 93306 TTE W/DOPPLER COMPLETE: CPT

## 2025-08-08 PROCEDURE — 81001 URINALYSIS AUTO W/SCOPE: CPT

## 2025-08-08 PROCEDURE — 86901 BLOOD TYPING SEROLOGIC RH(D): CPT

## 2025-08-08 PROCEDURE — 80053 COMPREHEN METABOLIC PANEL: CPT

## 2025-08-08 PROCEDURE — 86923 COMPATIBILITY TEST ELECTRIC: CPT

## 2025-08-08 PROCEDURE — 84155 ASSAY OF PROTEIN SERUM: CPT

## 2025-08-08 PROCEDURE — 84100 ASSAY OF PHOSPHORUS: CPT

## 2025-08-08 PROCEDURE — 97530 THERAPEUTIC ACTIVITIES: CPT

## 2025-08-08 PROCEDURE — 83880 ASSAY OF NATRIURETIC PEPTIDE: CPT

## 2025-08-08 PROCEDURE — 97166 OT EVAL MOD COMPLEX 45 MIN: CPT

## 2025-08-08 PROCEDURE — 97110 THERAPEUTIC EXERCISES: CPT

## 2025-08-08 PROCEDURE — 86900 BLOOD TYPING SEROLOGIC ABO: CPT

## 2025-08-08 PROCEDURE — 83521 IG LIGHT CHAINS FREE EACH: CPT

## 2025-08-08 PROCEDURE — 99233 SBSQ HOSP IP/OBS HIGH 50: CPT

## 2025-08-08 PROCEDURE — 96374 THER/PROPH/DIAG INJ IV PUSH: CPT

## 2025-08-08 PROCEDURE — 87507 IADNA-DNA/RNA PROBE TQ 12-25: CPT

## 2025-08-08 PROCEDURE — 87040 BLOOD CULTURE FOR BACTERIA: CPT

## 2025-08-08 PROCEDURE — 97535 SELF CARE MNGMENT TRAINING: CPT

## 2025-08-08 PROCEDURE — 83550 IRON BINDING TEST: CPT

## 2025-08-08 PROCEDURE — 87517 HEPATITIS B DNA QUANT: CPT

## 2025-08-08 PROCEDURE — 86704 HEP B CORE ANTIBODY TOTAL: CPT

## 2025-08-08 PROCEDURE — 99285 EMERGENCY DEPT VISIT HI MDM: CPT | Mod: 25

## 2025-08-08 PROCEDURE — 73200 CT UPPER EXTREMITY W/O DYE: CPT

## 2025-08-08 PROCEDURE — P9047: CPT

## 2025-08-08 PROCEDURE — 86850 RBC ANTIBODY SCREEN: CPT

## 2025-08-08 PROCEDURE — 93970 EXTREMITY STUDY: CPT

## 2025-08-08 PROCEDURE — 82962 GLUCOSE BLOOD TEST: CPT

## 2025-08-08 PROCEDURE — 86707 HEPATITIS BE ANTIBODY: CPT

## 2025-08-08 PROCEDURE — 80061 LIPID PANEL: CPT

## 2025-08-08 PROCEDURE — 80048 BASIC METABOLIC PNL TOTAL CA: CPT

## 2025-08-08 PROCEDURE — 36430 TRANSFUSION BLD/BLD COMPNT: CPT

## 2025-08-08 PROCEDURE — 82728 ASSAY OF FERRITIN: CPT

## 2025-08-08 PROCEDURE — 76376 3D RENDER W/INTRP POSTPROCES: CPT

## 2025-08-08 PROCEDURE — 82272 OCCULT BLD FECES 1-3 TESTS: CPT

## 2025-08-08 PROCEDURE — 97116 GAIT TRAINING THERAPY: CPT

## 2025-08-08 PROCEDURE — 85610 PROTHROMBIN TIME: CPT

## 2025-08-08 PROCEDURE — 84550 ASSAY OF BLOOD/URIC ACID: CPT

## 2025-08-08 PROCEDURE — 86803 HEPATITIS C AB TEST: CPT

## 2025-08-08 RX ORDER — INSULIN LISPRO 100 U/ML
0 INJECTION, SOLUTION INTRAVENOUS; SUBCUTANEOUS
Qty: 0 | Refills: 0 | DISCHARGE
Start: 2025-08-08

## 2025-08-08 RX ORDER — FUROSEMIDE 10 MG/ML
20 INJECTION INTRAMUSCULAR; INTRAVENOUS DAILY
Refills: 0 | Status: DISCONTINUED | OUTPATIENT
Start: 2025-08-09 | End: 2025-08-08

## 2025-08-08 RX ORDER — B1/B2/B3/B5/B6/B12/VIT C/FOLIC 500-0.5 MG
1 TABLET ORAL
Qty: 0 | Refills: 0 | DISCHARGE
Start: 2025-08-08

## 2025-08-08 RX ORDER — SENNA 187 MG
2 TABLET ORAL
Qty: 0 | Refills: 0 | DISCHARGE
Start: 2025-08-08

## 2025-08-08 RX ORDER — FUROSEMIDE 10 MG/ML
40 INJECTION INTRAMUSCULAR; INTRAVENOUS ONCE
Refills: 0 | Status: COMPLETED | OUTPATIENT
Start: 2025-08-08 | End: 2025-08-08

## 2025-08-08 RX ORDER — FUROSEMIDE 10 MG/ML
1 INJECTION INTRAMUSCULAR; INTRAVENOUS
Qty: 0 | Refills: 0 | DISCHARGE
Start: 2025-08-08

## 2025-08-08 RX ORDER — POLYETHYLENE GLYCOL 3350 17 G/17G
17 POWDER, FOR SOLUTION ORAL
Qty: 0 | Refills: 0 | DISCHARGE
Start: 2025-08-08

## 2025-08-08 RX ORDER — BUTYROSPERMUM PARKII(SHEA BUTTER), SIMMONDSIA CHINENSIS (JOJOBA) SEED OIL, ALOE BARBADENSIS LEAF EXTRACT .01; 1; 3.5 G/100G; G/100G; G/100G
1 LIQUID TOPICAL
Qty: 0 | Refills: 0 | DISCHARGE
Start: 2025-08-08

## 2025-08-08 RX ADMIN — Medication 1 GRAM(S): at 17:15

## 2025-08-08 RX ADMIN — Medication 1 GRAM(S): at 05:08

## 2025-08-08 RX ADMIN — BUTYROSPERMUM PARKII(SHEA BUTTER), SIMMONDSIA CHINENSIS (JOJOBA) SEED OIL, ALOE BARBADENSIS LEAF EXTRACT 250 MILLIGRAM(S): .01; 1; 3.5 LIQUID TOPICAL at 17:15

## 2025-08-08 RX ADMIN — FOLIC ACID 1 MILLIGRAM(S): 1 TABLET ORAL at 11:43

## 2025-08-08 RX ADMIN — FUROSEMIDE 40 MILLIGRAM(S): 10 INJECTION INTRAMUSCULAR; INTRAVENOUS at 17:15

## 2025-08-08 RX ADMIN — Medication 1 TABLET(S): at 11:44

## 2025-08-08 RX ADMIN — INSULIN LISPRO 1: 100 INJECTION, SOLUTION INTRAVENOUS; SUBCUTANEOUS at 17:02

## 2025-08-08 RX ADMIN — AMLODIPINE BESYLATE 10 MILLIGRAM(S): 10 TABLET ORAL at 05:10

## 2025-08-08 RX ADMIN — Medication 40 MILLIGRAM(S): at 17:15

## 2025-08-08 RX ADMIN — Medication 40 MILLIGRAM(S): at 05:08

## 2025-08-08 RX ADMIN — INSULIN LISPRO 2: 100 INJECTION, SOLUTION INTRAVENOUS; SUBCUTANEOUS at 12:20

## 2025-08-08 RX ADMIN — Medication 1 GRAM(S): at 11:43

## 2025-08-08 RX ADMIN — METOPROLOL SUCCINATE 25 MILLIGRAM(S): 50 TABLET, EXTENDED RELEASE ORAL at 05:08

## 2025-08-08 RX ADMIN — BUTYROSPERMUM PARKII(SHEA BUTTER), SIMMONDSIA CHINENSIS (JOJOBA) SEED OIL, ALOE BARBADENSIS LEAF EXTRACT 250 MILLIGRAM(S): .01; 1; 3.5 LIQUID TOPICAL at 05:08

## 2025-08-15 ENCOUNTER — TRANSCRIPTION ENCOUNTER (OUTPATIENT)
Age: 89
End: 2025-08-15

## 2025-08-19 ENCOUNTER — TRANSCRIPTION ENCOUNTER (OUTPATIENT)
Age: 89
End: 2025-08-19

## 2025-09-08 ENCOUNTER — APPOINTMENT (OUTPATIENT)
Dept: NEPHROLOGY | Facility: CLINIC | Age: 89
End: 2025-09-08

## 2025-09-09 DIAGNOSIS — N18.32 CHRONIC KIDNEY DISEASE, STAGE 3B: ICD-10-CM

## 2025-09-15 ENCOUNTER — APPOINTMENT (OUTPATIENT)
Age: 89
End: 2025-09-15
Payer: MEDICARE

## 2025-09-15 VITALS — HEIGHT: 56 IN | BODY MASS INDEX: 21.01 KG/M2 | HEART RATE: 71 BPM | WEIGHT: 93.38 LBS | OXYGEN SATURATION: 98 %

## 2025-09-15 VITALS — SYSTOLIC BLOOD PRESSURE: 98 MMHG | DIASTOLIC BLOOD PRESSURE: 50 MMHG

## 2025-09-15 DIAGNOSIS — K21.9 GASTRO-ESOPHAGEAL REFLUX DISEASE W/OUT ESOPHAGITIS: ICD-10-CM

## 2025-09-15 DIAGNOSIS — N18.4 CHRONIC KIDNEY DISEASE, STAGE 4 (SEVERE): ICD-10-CM

## 2025-09-15 DIAGNOSIS — E78.5 HYPERLIPIDEMIA, UNSPECIFIED: ICD-10-CM

## 2025-09-15 DIAGNOSIS — R73.9 HYPERGLYCEMIA, UNSPECIFIED: ICD-10-CM

## 2025-09-15 DIAGNOSIS — I10 ESSENTIAL (PRIMARY) HYPERTENSION: ICD-10-CM

## 2025-09-15 PROCEDURE — G2211 COMPLEX E/M VISIT ADD ON: CPT

## 2025-09-15 PROCEDURE — 99214 OFFICE O/P EST MOD 30 MIN: CPT

## 2025-09-15 PROCEDURE — 36415 COLL VENOUS BLD VENIPUNCTURE: CPT

## 2025-09-16 ENCOUNTER — TRANSCRIPTION ENCOUNTER (OUTPATIENT)
Age: 89
End: 2025-09-16

## 2025-09-16 ENCOUNTER — RX RENEWAL (OUTPATIENT)
Age: 89
End: 2025-09-16

## 2025-09-16 LAB
ALBUMIN SERPL ELPH-MCNC: 2.9 G/DL
ALP BLD-CCNC: 637 U/L
ALT SERPL-CCNC: 37 U/L
ANION GAP SERPL CALC-SCNC: 15 MMOL/L
AST SERPL-CCNC: 34 U/L
BASOPHILS # BLD AUTO: 0.11 K/UL
BASOPHILS NFR BLD AUTO: 0.9 %
BILIRUB SERPL-MCNC: 0.4 MG/DL
BUN SERPL-MCNC: 68 MG/DL
CALCIUM SERPL-MCNC: 8.4 MG/DL
CHLORIDE SERPL-SCNC: 105 MMOL/L
CHOLEST SERPL-MCNC: 163 MG/DL
CO2 SERPL-SCNC: 15 MMOL/L
CREAT SERPL-MCNC: 4.18 MG/DL
EGFRCR SERPLBLD CKD-EPI 2021: 10 ML/MIN/1.73M2
EOSINOPHIL # BLD AUTO: 0.72 K/UL
EOSINOPHIL NFR BLD AUTO: 6.2 %
ESTIMATED AVERAGE GLUCOSE: 151 MG/DL
GLUCOSE SERPL-MCNC: 257 MG/DL
HBA1C MFR BLD HPLC: 6.9 %
HCT VFR BLD CALC: 32.8 %
HDLC SERPL-MCNC: 37 MG/DL
HGB BLD-MCNC: 10.4 G/DL
IMM GRANULOCYTES NFR BLD AUTO: 0.8 %
LDLC SERPL-MCNC: 92 MG/DL
LYMPHOCYTES # BLD AUTO: 2.71 K/UL
LYMPHOCYTES NFR BLD AUTO: 23.2 %
MAN DIFF?: NORMAL
MCHC RBC-ENTMCNC: 29.1 PG
MCHC RBC-ENTMCNC: 31.7 G/DL
MCV RBC AUTO: 91.9 FL
MONOCYTES # BLD AUTO: 0.93 K/UL
MONOCYTES NFR BLD AUTO: 8 %
NEUTROPHILS # BLD AUTO: 7.13 K/UL
NEUTROPHILS NFR BLD AUTO: 60.9 %
NONHDLC SERPL-MCNC: 126 MG/DL
PLATELET # BLD AUTO: 347 K/UL
POTASSIUM SERPL-SCNC: 4.8 MMOL/L
PROT SERPL-MCNC: 6.3 G/DL
RBC # BLD: 3.57 M/UL
RBC # FLD: 16.2 %
SODIUM SERPL-SCNC: 135 MMOL/L
TRIGL SERPL-MCNC: 198 MG/DL
TSH SERPL-ACNC: 0.86 UIU/ML
WBC # FLD AUTO: 11.69 K/UL

## 2025-09-19 ENCOUNTER — APPOINTMENT (OUTPATIENT)
Dept: NEPHROLOGY | Facility: CLINIC | Age: 89
End: 2025-09-19